# Patient Record
Sex: FEMALE | Race: WHITE | NOT HISPANIC OR LATINO | Employment: OTHER | URBAN - METROPOLITAN AREA
[De-identification: names, ages, dates, MRNs, and addresses within clinical notes are randomized per-mention and may not be internally consistent; named-entity substitution may affect disease eponyms.]

---

## 2017-01-02 ENCOUNTER — OFFICE VISIT (OUTPATIENT)
Dept: ORTHOPEDICS | Facility: CLINIC | Age: 82
End: 2017-01-02
Payer: MEDICARE

## 2017-01-02 VITALS — HEIGHT: 60 IN | RESPIRATION RATE: 18 BRPM | BODY MASS INDEX: 29.25 KG/M2 | WEIGHT: 149 LBS

## 2017-01-02 DIAGNOSIS — M75.101 ROTATOR CUFF TEAR ARTHROPATHY, RIGHT: Primary | ICD-10-CM

## 2017-01-02 DIAGNOSIS — M12.811 ROTATOR CUFF TEAR ARTHROPATHY, RIGHT: Primary | ICD-10-CM

## 2017-01-02 PROCEDURE — 20610 DRAIN/INJ JOINT/BURSA W/O US: CPT | Mod: RT | Performed by: ORTHOPAEDIC SURGERY

## 2017-01-02 RX ORDER — TRIAMCINOLONE ACETONIDE 40 MG/ML
80 INJECTION, SUSPENSION INTRA-ARTICULAR; INTRAMUSCULAR ONCE
Qty: 2 ML | Refills: 0 | OUTPATIENT
Start: 2017-01-02 | End: 2017-01-02

## 2017-01-02 NOTE — NURSING NOTE
Chief Complaint   Patient presents with     RECHECK     Right shoulder last injection on 10/24/16.       Initial Resp 18  Ht 1.524 m (5')  Wt 67.586 kg (149 lb)  BMI 29.10 kg/m2 Estimated body mass index is 29.1 kg/(m^2) as calculated from the following:    Height as of this encounter: 1.524 m (5').    Weight as of this encounter: 67.586 kg (149 lb).  BP completed using cuff size: NA (Not Taken)  Ivory Monzon MA

## 2017-01-02 NOTE — MR AVS SNAPSHOT
"              After Visit Summary   1/2/2017    Pam Hartman    MRN: [id number]           Patient Information     Date Of Birth          1935        Visit Information        Provider Department      1/2/2017 10:00 AM Nahun Skinner MD [facility]        Today's Diagnoses     Rotator cuff tear arthropathy, right    -  1       Care Instructions    You have been given a \"cortisone\" injection today.  There are two medications in the injection you were given.      One of these medications is lidocaine, which is similar to the numbing agent you receive at the dentist.  You should start to feel the effect of this medication starting a few  minutes after you are injected and it will last for about two hours.  After the lidocaine wears off, the area will feel sore and a small percentage of people actually have a slight increase of pain for the first 24-48 hours after the lidocaine wears off.     The other medication is the \"cortisone\" type medication.  This medication takes effect slowly and should make you more comfortable day by day over the next two weeks.  After the first two weeks, the medication levels off and keeps working over about three months or longer.      You may have the area injected, in general, every three months.  This is the estimated amount of time that the body takes to metabolize the \"cortisone.\"  Getting injections too frequently may result in a softening of the cartilage and cause the joint to actually wear out more quickly.    The most important thing for you to note is how the area feels over the next two hours.  If the correct space was injected, the area should feel much more comfortable over the next two hours.  If the area never gets comfortable over this time period, it is possible that the medication was trapped in some surrounding soft tissue and is not free in the joint.  If this happens, the medication will not work long-term.  However, if this occurs we can " attempt to re-inject the area sooner that the three months that is normally recommended.  If you report to us that the injection was NOT effective, we will ask you if it worked initially.  Please note how the area feels during the next two hours, as we may be inquiring about this.    To schedule another appointment, call [phone].          Follow-ups after your visit        Who to contact     If you have questions or need follow up information about today's clinic visit or your schedule please contact [facility] directly at [phone].  Normal or non-critical lab and imaging results will be communicated to you by [employer], letter or phone within 4 business days after the clinic has received the results. If you do not hear from us within 7 days, please contact the clinic through [employer] or phone. If you have a critical or abnormal lab result, we will notify you by phone as soon as possible.  Submit refill requests through [employer] or call your pharmacy and they will forward the refill request to us. Please allow 3 business days for your refill to be completed.          Additional Information About Your Visit        [employer] Information     [employer] gives you secure access to your electronic health record. If you see a primary care provider, you can also send messages to your care team and make appointments. If you have questions, please call your primary care clinic.  If you do not have a primary care provider, please call [phone] and they will assist you.        Your Vitals Were     Respirations Height BMI (Body Mass Index)             18 1.524 m (5') 29.10 kg/m2          Blood Pressure from Last 3 Encounters:   12/05/16 138/64   09/23/16 154/68   08/31/16 155/78    Weight from Last 3 Encounters:   01/02/17 67.586 kg (149 lb)   12/05/16 67.132 kg (148 lb)   10/24/16 68.947 kg (152 lb)              Today, you had the following     No orders found for display         Today's Medication Changes           These changes are accurate as of: 1/2/17 11:00 AM.  If you have any questions, ask your nurse or doctor.               These medicines have changed or have updated prescriptions.        Dose/Directions    furosemide 20 MG tablet   Commonly known as:  LASIX   This may have changed:  how much to take   Used for:  Generalized edema        Dose:  40 mg   Take 2 tablets (40 mg) by mouth daily   Quantity:  180 tablet   Refills:  3                Primary Care Provider Office Phone # Fax #    Satish Pena [phone]       [facility] [address] [place], [place]. \[facility].\"" [id number]        Thank you!     Thank you for choosing [facility]  for your care. Our goal is always to provide you with excellent care. Hearing back from our patients is one way we can continue to improve our services. Please take a few minutes to complete the written survey that you may receive in the mail after your visit with us. Thank you!             Your Updated Medication List - Protect others around you: Learn how to safely use, store and throw away your medicines at www.disposemymeds.org.          This list is accurate as of: 1/2/17 11:00 AM.  Always use your most recent med list.                   Brand Name Dispense Instructions for use    amLODIPine 5 MG tablet    NORVASC    90 tablet    Take 1 tablet (5 mg) by mouth daily       aspirin 81 MG tablet      Take 1 tablet by mouth daily.       blood glucose monitoring meter device kit    no brand specified    1 kit    Use to test blood sugar 2 times daily or as directed.       * blood glucose monitoring test strip    CHON CONTOUR    100 strip    1 strip by In Vitro route daily       * blood glucose monitoring test strip    no brand specified    100 each    Use to test blood sugars 2 times daily or as directed       calcipotriene 0.005 % Soln solution    DOVONEX    1 Bottle    Apply  topically. APPLY bid AS NEEDED       calcium  carbonate-vitamin D 500-400 MG-UNIT Tabs per tablet     180 tablet    ONE BY MOUTH TWICE A DAY       fluticasone 50 MCG/ACT spray    FLONASE    3 Bottle    Spray 1-2 sprays into both nostrils daily       furosemide 20 MG tablet    LASIX    180 tablet    Take 2 tablets (40 mg) by mouth daily       losartan 100 MG tablet    COZAAR    90 tablet    Take 1 tablet (100 mg) by mouth daily       metFORMIN 500 MG tablet    GLUCOPHAGE    90 tablet    Take 1 tablet (500 mg) by mouth daily (with breakfast)       PROBIOTIC PO          simvastatin 20 MG tablet    ZOCOR    90 tablet    Take 0.5 tablets (10 mg) by mouth At Bedtime       TYLENOL PO      Take 2,000 mg by mouth daily       * Notice:  This list has 2 medication(s) that are the same as other medications prescribed for you. Read the directions carefully, and ask your doctor or other care provider to review them with you.

## 2017-01-02 NOTE — PROGRESS NOTES
The patient's right shoulder was prepped with betadine solution after verification of allergies. Area approximately 10 cm x 10 cm prepped in a sterile fashion. After injection, betadine removed with soap and water and band-aids applied.    1ml kenalog with 1% lidocaine plain injected into patient's right shoulder by Dr. Nahun Skinner  LOT# [id number]  Exp.4/18  '

## 2017-01-02 NOTE — PATIENT INSTRUCTIONS
"You have been given a \"cortisone\" injection today.  There are two medications in the injection you were given.      One of these medications is lidocaine, which is similar to the numbing agent you receive at the dentist.  You should start to feel the effect of this medication starting a few  minutes after you are injected and it will last for about two hours.  After the lidocaine wears off, the area will feel sore and a small percentage of people actually have a slight increase of pain for the first 24-48 hours after the lidocaine wears off.     The other medication is the \"cortisone\" type medication.  This medication takes effect slowly and should make you more comfortable day by day over the next two weeks.  After the first two weeks, the medication levels off and keeps working over about three months or longer.      You may have the area injected, in general, every three months.  This is the estimated amount of time that the body takes to metabolize the \"cortisone.\"  Getting injections too frequently may result in a softening of the cartilage and cause the joint to actually wear out more quickly.    The most important thing for you to note is how the area feels over the next two hours.  If the correct space was injected, the area should feel much more comfortable over the next two hours.  If the area never gets comfortable over this time period, it is possible that the medication was trapped in some surrounding soft tissue and is not free in the joint.  If this happens, the medication will not work long-term.  However, if this occurs we can attempt to re-inject the area sooner that the three months that is normally recommended.  If you report to us that the injection was NOT effective, we will ask you if it worked initially.  Please note how the area feels during the next two hours, as we may be inquiring about this.    To schedule another appointment, call [phone].    "

## 2017-01-03 NOTE — PROGRESS NOTES
Follow up right shoulder cuff tear arthropathy.  Patient desires injection today of right shoulder.  Risks, benefits, potential complications and alternatives were discussed.   With the patient's consent, sterile prep was performed of right shoulder.  Right shoulder was injected with Kenalog 40 mg and lidocaine at shoulder subacromial space from the posterolateral approach .  Return to clinic as needed.

## 2017-04-03 ENCOUNTER — OFFICE VISIT (OUTPATIENT)
Dept: ORTHOPEDICS | Facility: CLINIC | Age: 82
End: 2017-04-03
Payer: MEDICARE

## 2017-04-03 VITALS — HEIGHT: 60 IN | WEIGHT: 148 LBS | BODY MASS INDEX: 29.06 KG/M2 | RESPIRATION RATE: 14 BRPM

## 2017-04-03 DIAGNOSIS — M75.101 ROTATOR CUFF TEAR ARTHROPATHY, RIGHT: Primary | ICD-10-CM

## 2017-04-03 DIAGNOSIS — M12.811 ROTATOR CUFF TEAR ARTHROPATHY, RIGHT: Primary | ICD-10-CM

## 2017-04-03 PROCEDURE — 20610 DRAIN/INJ JOINT/BURSA W/O US: CPT | Mod: RT | Performed by: ORTHOPAEDIC SURGERY

## 2017-04-03 RX ORDER — TRIAMCINOLONE ACETONIDE 40 MG/ML
40 INJECTION, SUSPENSION INTRA-ARTICULAR; INTRAMUSCULAR ONCE
Qty: 1 ML | Refills: 0 | OUTPATIENT
Start: 2017-04-03 | End: 2017-04-03

## 2017-04-03 NOTE — LETTER
4/3/2017       RE: Pam Hartman  [address]    [facility] [address]           Dear Colleague,    Thank you for referring your patient, Pam Hartman, to the [facility]. Please see a copy of my visit note below.    The patient's right shoulder was prepped with betadine solution after verification of allergies. Area approximately 10 cm x 10 cm prepped in a sterile fashion. After injection, betadine removed with soap and water and band-aids applied.    1ml kenalog with 1% lidocaine plain injected into patient's right shoulder by Dr. Nahun Skinner  LOT# [id number]  Exp. 05/2018    Follow up right shoulder cuff tear arthropathy.  Patient desires injection today of right shoulder.  Risks, benefits, potential complications and alternatives were discussed.   With the patient's consent, sterile prep was performed of right shoulder.  Right shoulder was injected with Kenalog 40 mg and lidocaine at shoulder subacromial space from the posterolateral approach .  Return to clinic as needed.  If pain continues, we will consider reverse total shoulder arthroplasty.  Would need new x-ray and then [facility] CT.      Again, thank you for allowing me to participate in the care of your patient.        Sincerely,              Nahun Skinner MD

## 2017-04-03 NOTE — PROGRESS NOTES
The patient's right shoulder was prepped with betadine solution after verification of allergies. Area approximately 10 cm x 10 cm prepped in a sterile fashion. After injection, betadine removed with soap and water and band-aids applied.    1ml kenalog with 1% lidocaine plain injected into patient's right shoulder by Dr. Nahun Skinner  LOT# [id number]  Exp. 05/2018

## 2017-04-03 NOTE — NURSING NOTE
Chief Complaint   Patient presents with     RECHECK     Follow-up for right shoulder cuff tear arthropathy, last injection 1/2/17.       Initial Resp 14  Ht 1.524 m (5')  Wt 67.1 kg (148 lb)  BMI 28.9 kg/m2 Estimated body mass index is 28.9 kg/(m^2) as calculated from the following:    Height as of this encounter: 1.524 m (5').    Weight as of this encounter: 67.1 kg (148 lb).  Medication Reconciliation: complete     Mike Agudelo PA-C  Supervising physician: Nahun Skinner MD  Dept. of Orthopedics  [facility]

## 2017-04-03 NOTE — MR AVS SNAPSHOT
After Visit Summary   4/3/2017    Pam Hartman    MRN: [id number]           Patient Information     Date Of Birth          1935        Visit Information        Provider Department      4/3/2017 9:30 AM Nahun Skinner MD [facility]        Today's Diagnoses     Rotator cuff tear arthropathy, right    -  1      Care Instructions    You have had a steroid injection today.  For the first 2 hours there will likely be some numbing in the joint from the lidocaine.  This is a good sign, indicating that the injection is in the right place.  In 2 hours the lidocaine will wear off, and the joint will hurt like you had a shot.  Each day the cortisone makes it feel better.  It reaches peak effect in 2 weeks.  We expect it to last for 3 months.  You may resume regular activity when you feel ready.  If you are diabetic, your glucoses will be quite high for several days.          Follow-ups after your visit        Who to contact     If you have questions or need follow up information about today's clinic visit or your schedule please contact [facility] directly at [phone].  Normal or non-critical lab and imaging results will be communicated to you by [employer], letter or phone within 4 business days after the clinic has received the results. If you do not hear from us within 7 days, please contact the clinic through [employer] or phone. If you have a critical or abnormal lab result, we will notify you by phone as soon as possible.  Submit refill requests through [employer] or call your pharmacy and they will forward the refill request to us. Please allow 3 business days for your refill to be completed.          Additional Information About Your Visit        [employer] Information     [employer] gives you secure access to your electronic health record. If you see a primary care provider, you can also send messages to your care team and make appointments. If you have questions, please  call your primary care clinic.  If you do not have a primary care provider, please call [phone] and they will assist you.        Care EveryWhere ID     This is your Care EveryWhere ID. This could be used by other organizations to access your [facility] medical records  [id number]        Your Vitals Were     Respirations Height BMI (Body Mass Index)             14 1.524 m (5') 28.9 kg/m2          Blood Pressure from Last 3 Encounters:   12/05/16 138/64   09/23/16 154/68   08/31/16 155/78    Weight from Last 3 Encounters:   04/03/17 67.1 kg (148 lb)   01/02/17 67.6 kg (149 lb)   12/05/16 67.1 kg (148 lb)              We Performed the Following     DRAIN/INJECT LARGE JOINT/BURSA     TRIAMCINOLONE ACET INJ NOS          Today's Medication Changes          These changes are accurate as of: 4/3/17 12:48 PM.  If you have any questions, ask your nurse or doctor.               Start taking these medicines.        Dose/Directions    triamcinolone acetonide 40 MG/ML injection   Commonly known as:  KENALOG   Used for:  Rotator cuff tear arthropathy, right   Started by:  Nahun Skinner MD        Dose:  40 mg   1 mL (40 mg) by INTRA-ARTICULAR route once for 1 dose   Quantity:  1 mL   Refills:  0         These medicines have changed or have updated prescriptions.        Dose/Directions    furosemide 20 MG tablet   Commonly known as:  LASIX   This may have changed:  how much to take   Used for:  Generalized edema        Dose:  40 mg   Take 2 tablets (40 mg) by mouth daily   Quantity:  180 tablet   Refills:  3            Where to get your medicines      Some of these will need a paper prescription and others can be bought over the counter.  Ask your nurse if you have questions.     You don't need a prescription for these medications     triamcinolone acetonide 40 MG/ML injection                Primary Care Provider Office Phone # Fax #    Satish Pena [phone]       [place] [address]  [facility] [address]        Thank you!     Thank you for [facility]  for your care. Our goal is always to provide you with excellent care. Hearing back from our patients is one way we can continue to improve our services. Please take a few minutes to complete the written survey that you may receive in the mail after your visit with us. Thank you!             Your Updated Medication List - Protect others around you: Learn how to safely use, store and throw away your medicines at www.disposemymeds.org.          This list is accurate as of: 4/3/17 12:48 PM.  Always use your most recent med list.                   Brand Name Dispense Instructions for use    amLODIPine 5 MG tablet    NORVASC    90 tablet    Take 1 tablet (5 mg) by mouth daily       aspirin 81 MG tablet      Take 1 tablet by mouth daily.       blood glucose monitoring meter device kit    no brand specified    1 kit    Use to test blood sugar 2 times daily or as directed.       * blood glucose monitoring test strip    CHON CONTOUR    100 strip    1 strip by In Vitro route daily       * blood glucose monitoring test strip    no brand specified    100 each    Use to test blood sugars 2 times daily or as directed       calcipotriene 0.005 % Soln solution    DOVONEX    1 Bottle    Apply  topically. APPLY bid AS NEEDED       calcium carbonate-vitamin D 500-400 MG-UNIT Tabs per tablet     180 tablet    ONE BY MOUTH TWICE A DAY       fluticasone 50 MCG/ACT spray    FLONASE    3 Bottle    Spray 1-2 sprays into both nostrils daily       furosemide 20 MG tablet    LASIX    180 tablet    Take 2 tablets (40 mg) by mouth daily       losartan 100 MG tablet    COZAAR    90 tablet    Take 1 tablet (100 mg) by mouth daily       metFORMIN 500 MG tablet    GLUCOPHAGE    90 tablet    Take 1 tablet (500 mg) by mouth daily (with breakfast)       PROBIOTIC PO          simvastatin 20 MG tablet    ZOCOR    90 tablet    Take 0.5 tablets  (10 mg) by mouth At Bedtime       triamcinolone acetonide 40 MG/ML injection    KENALOG    1 mL    1 mL (40 mg) by INTRA-ARTICULAR route once for 1 dose       TYLENOL PO      Take 2,000 mg by mouth daily       * Notice:  This list has 2 medication(s) that are the same as other medications prescribed for you. Read the directions carefully, and ask your doctor or other care provider to review them with you.

## 2017-04-03 NOTE — PROGRESS NOTES
Follow up right shoulder cuff tear arthropathy.  Patient desires injection today of right shoulder.  Risks, benefits, potential complications and alternatives were discussed.   With the patient's consent, sterile prep was performed of right shoulder.  Right shoulder was injected with Kenalog 40 mg and lidocaine at shoulder subacromial space from the posterolateral approach .  Return to clinic as needed.  If pain continues, we will consider reverse total shoulder arthroplasty.  Would need new x-ray and then [facility] CT.

## 2017-05-23 ENCOUNTER — TELEPHONE (OUTPATIENT)
Dept: ORTHOPEDICS | Facility: CLINIC | Age: 82
End: 2017-05-23

## 2017-05-23 NOTE — TELEPHONE ENCOUNTER
Patient is calling wanting to schedule surgery for her right shoulder. Please call and advise   Patient will be at her home number until 12:00pm then she is leaving for her cabin you can   Reach her at [phone] you can leave a message. Thank you

## 2017-05-23 NOTE — TELEPHONE ENCOUNTER
Spoke with Pam regarding her request. I let her know that I looked at her last visit note with Dr. Skinner on 4/3/17. I told her at this point the next step would be for her to make an office visit with Dr. Skinner to be reevaluated and see if surgery is the appropriate next step. I gave her our clinic locations/availability and she has the number to call to schedule an appointment. She had no further questions at this time.    INNI AyonC  Supervising physician: Nahun Skinner MD  Dept. of Orthopedics  [facility]

## 2017-06-01 ENCOUNTER — RADIANT APPOINTMENT (OUTPATIENT)
Dept: GENERAL RADIOLOGY | Facility: CLINIC | Age: 82
End: 2017-06-01
Attending: ORTHOPAEDIC SURGERY
Payer: MEDICARE

## 2017-06-01 ENCOUNTER — OFFICE VISIT (OUTPATIENT)
Dept: ORTHOPEDICS | Facility: CLINIC | Age: 82
End: 2017-06-01
Payer: MEDICARE

## 2017-06-01 DIAGNOSIS — M75.101 ROTATOR CUFF TEAR ARTHROPATHY, RIGHT: Primary | ICD-10-CM

## 2017-06-01 DIAGNOSIS — M75.101 TEAR OF RIGHT ROTATOR CUFF, UNSPECIFIED TEAR EXTENT: ICD-10-CM

## 2017-06-01 DIAGNOSIS — M12.811 ROTATOR CUFF TEAR ARTHROPATHY, RIGHT: Primary | ICD-10-CM

## 2017-06-01 PROCEDURE — 99213 OFFICE O/P EST LOW 20 MIN: CPT | Performed by: ORTHOPAEDIC SURGERY

## 2017-06-01 PROCEDURE — 73030 X-RAY EXAM OF SHOULDER: CPT | Mod: RT

## 2017-06-01 NOTE — LETTER
6/1/2017       RE: Pam Hartman  [address]    [facility] [address]           Dear Colleague,    Thank you for referring your patient, Pam Hartman, to the [facility]. Please see a copy of my visit note below.    Pam Hartman is a 82 year old female who is seen in follow-up for right cuff tear arthropathy.    Past Medical History:   Diagnosis Date     Colon polyp      Degenerative arthritis      DM II (diabetes mellitus, type II)      HTN      Hyperlipidemia LDL goal < 100      IBS (irritable bowel syndrome)      Osteopenia      Psoriasis      Type 2 diabetes mellitus without complication (H) 10/21/2015     Ulnar neuropathy        Past Surgical History:   Procedure Laterality Date     EYE SURGERY  7/2015    Left and right cataract repair     SEPTOPLASTY  1/16/2014    Procedure: SEPTOPLASTY;  Septoplasty, Left ethmoidectomy, Left endoscopic maxillary antrostomy, right kike bullosa;  Surgeon: Sarah Garcia MD;  Location: [facility] OR     TONSILLECTOMY & ADENOIDECTOMY         Family History   Problem Relation Age of Onset     CEREBROVASCULAR DISEASE Father        Social History     Social History     Marital status:      Spouse name: N/A     Number of children: N/A     Years of education: N/A     Occupational History     Not on file.     Social History Main Topics     Smoking status: Former Smoker     Quit date: 11/30/1984     Smokeless tobacco: Never Used     Alcohol use Yes      Comment: 2 DRINKS PER MONTH     Drug use: No     Sexual activity: No     Other Topics Concern     Parent/Sibling W/ Cabg, Mi Or Angioplasty Before 65f 55m? No     Social History Narrative       Current Outpatient Prescriptions   Medication Sig Dispense Refill     simvastatin (ZOCOR) 20 MG tablet Take 0.5 tablets (10 mg) by mouth At Bedtime 90 tablet 4     amLODIPine (NORVASC) 5 MG tablet Take 1 tablet (5 mg) by mouth daily 90 tablet 3     metFORMIN (GLUCOPHAGE) 500 MG tablet Take 1 tablet  (500 mg) by mouth daily (with breakfast) 90 tablet 3     furosemide (LASIX) 20 MG tablet Take 2 tablets (40 mg) by mouth daily (Patient taking differently: Take 20 mg by mouth daily ) 180 tablet 3     fluticasone (FLONASE) 50 MCG/ACT nasal spray Spray 1-2 sprays into both nostrils daily 3 Bottle 11     losartan (COZAAR) 100 MG tablet Take 1 tablet (100 mg) by mouth daily 90 tablet 3     blood glucose monitoring (NO BRAND SPECIFIED) meter device kit Use to test blood sugar 2 times daily or as directed. 1 kit 0     blood glucose monitoring (NO BRAND SPECIFIED) test strip Use to test blood sugars 2 times daily or as directed 100 each 12     calcium carbonate-vitamin D 500-400 MG-UNIT TABS tablt ONE BY MOUTH TWICE A  tablet 3     blood glucose (CHON CONTOUR) test strip 1 strip by In Vitro route daily 100 strip PRN     Probiotic Product (PROBIOTIC PO)        Acetaminophen (TYLENOL PO) Take 2,000 mg by mouth daily        aspirin 81 MG tablet Take 1 tablet by mouth daily.       calcipotriene (DOVONEX) 0.005 % SOLN Apply  topically. APPLY bid AS NEEDED 1 Bottle 4       Allergies   Allergen Reactions     Septra [Bactrim]      Sulfa Drugs Rash       REVIEW OF SYSTEMS:  CONSTITUTIONAL:  NEGATIVE for fever, chills, change in weight, not feeling tired  SKIN:  NEGATIVE for worrisome rashes, no skin lumps, no skin ulcers and no non-healing wounds  EYES:  NEGATIVE for vision changes or irritation.  ENT/MOUTH:  NEGATIVE.  No hearing loss, no hoarseness, no difficulty swallowing.  RESP:  NEGATIVE. No cough or shortness of breath.  BREAST:  NEGATIVE for masses, tenderness or discharge  CV:  NEGATIVE for chest pain, palpitations or peripheral edema  GI:  NEGATIVE for nausea, abdominal pain, heartburn, or change in bowel habits  :  Negative. No dysuria, no hematuria  MUSCULOSKELETAL:  See HPI above  NEURO:  NEGATIVE . No headaches, no dizziness,  no numbness  ENDOCRINE:  NEGATIVE for temperature intolerance, skin/hair  changes  HEME/ALLERGY/IMMUNE:  NEGATIVE for bleeding problems  PSYCHIATRIC:  NEGATIVE. no anxiety, no depression.     Exam:  Vitals: There were no vitals taken for this visit.  BMI= There is no height or weight on file to calculate BMI.  Constitutional:  healthy, alert and no distress  Neuro: Alert and Oriented x 3, Gait normal. Sensation grossly WNL.  Psych: Affect normal   Respiratory: Breathing not labored.  Cardiovascular: normal peripheral pulses  Lymph: no adenopathy  Skin: No rashes,worrisome lesions or skin problems      Again, thank you for allowing me to participate in the care of your patient.        Sincerely,              Nahun Skinner MD

## 2017-06-01 NOTE — MR AVS SNAPSHOT
After Visit Summary   6/1/2017    Pam Hartman    MRN: [id number]           Patient Information     Date Of Birth          1935        Visit Information        Provider Department      6/1/2017 2:30 PM Nahun Skinner MD [facility]        Today's Diagnoses     Tear of right rotator cuff, unspecified tear extent    -  1      Care Instructions    Pam Hartman  and I talked about her condition and diagnosis.  Because of severe arthritis, and failure of conservative measures, we have decided to proceed with right reverse total shoulder arthroplasty.      We have talked in depth about the procedure and the risks, which include, but are not limited to:  * blood loss possibly requiring transfusion,   * blood clots with possible pulmonary embolism  * risk of dislocation.  There will be a motion restriction for 4-6 weeks.  Do not reach behind, do not push up off a chair or bed.  * infection or wound healing problems  * nerve damage   * vascular circulation problems  * continued pain  * Loosening or wear  * anesthetic risks  * The possibility of needing additional procedures.      We also talked about recovery time, which differs from patient to patient.    Average 1  day in the hospital.  Most people can return home at that point.  Physical Therapy will start at 2 weeks.    Preop xrays have been ordered, and medical clearance will need to be obtained.    Preop physical with primary physician is needed within 30 days of the surgery.  Nothing to eat or drink for 8 hours before surgery.  Stop blood thinners 5 days before surgery (aspirin, warfarin, anti-inflammatories).      Call [phone] to schedule your surgery.          Follow-ups after your visit        Who to contact     If you have questions or need follow up information about today's clinic visit or your schedule please contact [facility] directly at [phone].  Normal or non-critical lab and imaging results  will be communicated to you by TakeChargehart, letter or phone within 4 business days after the clinic has received the results. If you do not hear from us within 7 days, please contact the clinic through Conjectur or phone. If you have a critical or abnormal lab result, we will notify you by phone as soon as possible.  Submit refill requests through Conjectur or call your pharmacy and they will forward the refill request to us. Please allow 3 business days for your refill to be completed.          Additional Information About Your Visit        Conjectur Information     Conjectur gives you secure access to your electronic health record. If you see a primary care provider, you can also send messages to your care team and make appointments. If you have questions, please call your primary care clinic.  If you do not have a primary care provider, please call [phone] and they will assist you.        Care EveryWhere ID     This is your Care EveryWhere ID. This could be used by other organizations to access your [facility] medical records  [id number]         Blood Pressure from Last 3 Encounters:   12/05/16 138/64   09/23/16 154/68   08/31/16 155/78    Weight from Last 3 Encounters:   04/03/17 67.1 kg (148 lb)   01/02/17 67.6 kg (149 lb)   12/05/16 67.1 kg (148 lb)                 Today's Medication Changes          These changes are accurate as of: 6/1/17  2:36 PM.  If you have any questions, ask your nurse or doctor.               These medicines have changed or have updated prescriptions.        Dose/Directions    furosemide 20 MG tablet   Commonly known as:  LASIX   This may have changed:  how much to take   Used for:  Generalized edema        Dose:  40 mg   Take 2 tablets (40 mg) by mouth daily   Quantity:  180 tablet   Refills:  3                Primary Care Provider Office Phone # Fax #    Satish Pena [phone]       [address] [address]        Thank  you!     Thank you for choosing [facility]  for your care. Our goal is always to provide you with excellent care. Hearing back from our patients is one way we can continue to improve our services. Please take a few minutes to complete the written survey that you may receive in the mail after your visit with us. Thank you!             Your Updated Medication List - Protect others around you: Learn how to safely use, store and throw away your medicines at www.disposemymeds.org.          This list is accurate as of: 6/1/17  2:36 PM.  Always use your most recent med list.                   Brand Name Dispense Instructions for use    amLODIPine 5 MG tablet    NORVASC    90 tablet    Take 1 tablet (5 mg) by mouth daily       aspirin 81 MG tablet      Take 1 tablet by mouth daily.       blood glucose monitoring meter device kit    no brand specified    1 kit    Use to test blood sugar 2 times daily or as directed.       * blood glucose monitoring test strip    CHON CONTOUR    100 strip    1 strip by In Vitro route daily       * blood glucose monitoring test strip    no brand specified    100 each    Use to test blood sugars 2 times daily or as directed       calcipotriene 0.005 % Soln solution    DOVONEX    1 Bottle    Apply  topically. APPLY bid AS NEEDED       calcium carbonate-vitamin D 500-400 MG-UNIT Tabs per tablet     180 tablet    ONE BY MOUTH TWICE A DAY       fluticasone 50 MCG/ACT spray    FLONASE    3 Bottle    Spray 1-2 sprays into both nostrils daily       furosemide 20 MG tablet    LASIX    180 tablet    Take 2 tablets (40 mg) by mouth daily       losartan 100 MG tablet    COZAAR    90 tablet    Take 1 tablet (100 mg) by mouth daily       metFORMIN 500 MG tablet    GLUCOPHAGE    90 tablet    Take 1 tablet (500 mg) by mouth daily (with breakfast)       PROBIOTIC PO          simvastatin 20 MG tablet    ZOCOR    90 tablet    Take 0.5 tablets (10 mg) by mouth At Bedtime       TYLENOL PO      Take  2,000 mg by mouth daily       * Notice:  This list has 2 medication(s) that are the same as other medications prescribed for you. Read the directions carefully, and ask your doctor or other care provider to review them with you.

## 2017-06-01 NOTE — PATIENT INSTRUCTIONS
Pam Hartman  and I talked about her condition and diagnosis.  Because of severe arthritis, and failure of conservative measures, we have decided to proceed with right reverse total shoulder arthroplasty.      We have talked in depth about the procedure and the risks, which include, but are not limited to:  * blood loss possibly requiring transfusion,   * blood clots with possible pulmonary embolism  * risk of dislocation.  There will be a motion restriction for 4-6 weeks.  Do not reach behind, do not push up off a chair or bed.  * infection or wound healing problems  * nerve damage   * vascular circulation problems  * continued pain  * Loosening or wear  * anesthetic risks  * The possibility of needing additional procedures.      We also talked about recovery time, which differs from patient to patient.    Average 1  day in the hospital.  Most people can return home at that point.  Physical Therapy will start at 2 weeks.    Preop xrays have been ordered, and medical clearance will need to be obtained.    Preop physical with primary physician is needed within 30 days of the surgery.  Nothing to eat or drink for 8 hours before surgery.  Stop blood thinners 5 days before surgery (aspirin, warfarin, anti-inflammatories).      Call [phone] to schedule your surgery.

## 2017-06-02 ENCOUNTER — RADIANT APPOINTMENT (OUTPATIENT)
Dept: CT IMAGING | Facility: CLINIC | Age: 82
End: 2017-06-02
Attending: ORTHOPAEDIC SURGERY
Payer: MEDICARE

## 2017-06-02 DIAGNOSIS — M75.101 TEAR OF RIGHT ROTATOR CUFF, UNSPECIFIED TEAR EXTENT: ICD-10-CM

## 2017-06-02 PROCEDURE — 73200 CT UPPER EXTREMITY W/O DYE: CPT | Mod: TC

## 2017-06-02 NOTE — PROGRESS NOTES
Pam Hartman is a 82 year old female who is seen in follow-up for right cuff tear arthropathy.    Past Medical History:   Diagnosis Date     Colon polyp      Degenerative arthritis      DM II (diabetes mellitus, type II)      HTN      Hyperlipidemia LDL goal < 100      IBS (irritable bowel syndrome)      Osteopenia      Psoriasis      Type 2 diabetes mellitus without complication (H) 10/21/2015     Ulnar neuropathy        Past Surgical History:   Procedure Laterality Date     EYE SURGERY  7/2015    Left and right cataract repair     SEPTOPLASTY  1/16/2014    Procedure: SEPTOPLASTY;  Septoplasty, Left ethmoidectomy, Left endoscopic maxillary antrostomy, right kike bullosa;  Surgeon: Sarah Garcia MD;  Location: [facility] OR     TONSILLECTOMY & ADENOIDECTOMY         Family History   Problem Relation Age of Onset     CEREBROVASCULAR DISEASE Father        Social History     Social History     Marital status:      Spouse name: N/A     Number of children: N/A     Years of education: N/A     Occupational History     Not on file.     Social History Main Topics     Smoking status: Former Smoker     Quit date: 11/30/1984     Smokeless tobacco: Never Used     Alcohol use Yes      Comment: 2 DRINKS PER MONTH     Drug use: No     Sexual activity: No     Other Topics Concern     Parent/Sibling W/ Cabg, Mi Or Angioplasty Before 65f 55m? No     Social History Narrative       Current Outpatient Prescriptions   Medication Sig Dispense Refill     simvastatin (ZOCOR) 20 MG tablet Take 0.5 tablets (10 mg) by mouth At Bedtime 90 tablet 4     amLODIPine (NORVASC) 5 MG tablet Take 1 tablet (5 mg) by mouth daily 90 tablet 3     metFORMIN (GLUCOPHAGE) 500 MG tablet Take 1 tablet (500 mg) by mouth daily (with breakfast) 90 tablet 3     furosemide (LASIX) 20 MG tablet Take 2 tablets (40 mg) by mouth daily (Patient taking differently: Take 20 mg by mouth daily ) 180 tablet 3     fluticasone (FLONASE) 50 MCG/ACT nasal spray Spray 1-2  sprays into both nostrils daily 3 Bottle 11     losartan (COZAAR) 100 MG tablet Take 1 tablet (100 mg) by mouth daily 90 tablet 3     blood glucose monitoring (NO BRAND SPECIFIED) meter device kit Use to test blood sugar 2 times daily or as directed. 1 kit 0     blood glucose monitoring (NO BRAND SPECIFIED) test strip Use to test blood sugars 2 times daily or as directed 100 each 12     calcium carbonate-vitamin D 500-400 MG-UNIT TABS tablt ONE BY MOUTH TWICE A  tablet 3     blood glucose (CHON CONTOUR) test strip 1 strip by In Vitro route daily 100 strip PRN     Probiotic Product (PROBIOTIC PO)        Acetaminophen (TYLENOL PO) Take 2,000 mg by mouth daily        aspirin 81 MG tablet Take 1 tablet by mouth daily.       calcipotriene (DOVONEX) 0.005 % SOLN Apply  topically. APPLY bid AS NEEDED 1 Bottle 4       Allergies   Allergen Reactions     Septra [Bactrim]      Sulfa Drugs Rash       REVIEW OF SYSTEMS:  CONSTITUTIONAL:  NEGATIVE for fever, chills, change in weight, not feeling tired  SKIN:  NEGATIVE for worrisome rashes, no skin lumps, no skin ulcers and no non-healing wounds  EYES:  NEGATIVE for vision changes or irritation.  ENT/MOUTH:  NEGATIVE.  No hearing loss, no hoarseness, no difficulty swallowing.  RESP:  NEGATIVE. No cough or shortness of breath.  BREAST:  NEGATIVE for masses, tenderness or discharge  CV:  NEGATIVE for chest pain, palpitations or peripheral edema  GI:  NEGATIVE for nausea, abdominal pain, heartburn, or change in bowel habits  :  Negative. No dysuria, no hematuria  MUSCULOSKELETAL:  See HPI above  NEURO:  NEGATIVE . No headaches, no dizziness,  no numbness  ENDOCRINE:  NEGATIVE for temperature intolerance, skin/hair changes  HEME/ALLERGY/IMMUNE:  NEGATIVE for bleeding problems  PSYCHIATRIC:  NEGATIVE. no anxiety, no depression.     Exam:  Vitals: There were no vitals taken for this visit.  BMI= There is no height or weight on file to calculate BMI.  Constitutional:  healthy,  alert and no distress  Neuro: Alert and Oriented x 3, Gait normal. Sensation grossly WNL.  Psych: Affect normal   Respiratory: Breathing not labored.  Cardiovascular: normal peripheral pulses  Lymph: no adenopathy  Skin: No rashes,worrisome lesions or skin problems

## 2017-06-03 NOTE — PROGRESS NOTES
DATE OF VISIT:  2017       HISTORY OF PRESENT ILLNESS:  Ms. Pam Hartman is an 82-year-old female we have followed for right shoulder cuff tear arthropathy.  She has had several injections but injections have been working less well.  She thinks at this point because of pain, she is considering shoulder surgery with the reverse total shoulder arthroplasty.  We have repeated x-ray today showing severe osteoarthritis and superior migration of the humerus inconsistent with cuff tear arthropathy.      PHYSICAL EXAMINATION:  Shows full passive motion of the shoulder with full flexion at about 70 degrees, internal and external rotation.  She does have pain with further rotation.  She has definite weakness of resisted external rotation and abduction and has some pain with this.  She has mild pain with resisted internal rotation, but good strength.  Sensation and circulation are intact to the arm.      IMPRESSION:  Right shoulder cuff tear arthropathy.  She has maintained good motion.  We will proceed with the significant protocol CT and will plan reverse total shoulder arthroplasty for some time in mid July.  Risks and benefits were discussed with her and her  today.         SIERRA SWAIN MD             D: 2017 18:45   T: 2017 10:15   MT: SHANELLE      Name:     PAM HARTMAN   MRN:      [id number]        Account:      [id number]   :      1935           Visit Date:   2017      Document: [id number]

## 2017-06-05 DIAGNOSIS — E11.9 TYPE 2 DIABETES MELLITUS WITHOUT COMPLICATION, WITHOUT LONG-TERM CURRENT USE OF INSULIN (H): Primary | ICD-10-CM

## 2017-06-05 NOTE — TELEPHONE ENCOUNTER
Routing refill request to provider for review/approval because:  No associated diagnosis.  Darcy Gama RN [facility] Triage.

## 2017-06-05 NOTE — TELEPHONE ENCOUNTER
blood glucose monitoring (NO BRAND SPECIFIED) test strip         Last Written Prescription Date: 4-1-16  Last Fill Quantity: 100, # refills: 12  Last Office Visit with G, P or [facility] prescribing provider:  12-5-16   Next 5 appointments (look out 90 days)     Jul 13, 2017  9:20 AM CDT   [facility]    [address] [facility] [address] [phone]            Aug 01, 2017  1:00 PM CDT   [facility]    [address] [facility] [address] [phone]                   BP Readings from Last 3 Encounters:   12/05/16 138/64   09/23/16 154/68   08/31/16 155/78     Lab Results   Component Value Date    MICROL 25 07/11/2016     Lab Results   Component Value Date    UMALCR 16.38 07/11/2016     Creatinine   Date Value Ref Range Status   11/28/2016 1.12 (H) 0.52 - 1.04 mg/dL Final   ]  GFR Estimate   Date Value Ref Range Status   11/28/2016 47 (L) >60 mL/min/1.7m2 Final     Comment:     Non  GFR Calc   07/11/2016 54 (L) >60 mL/min/1.7m2 Final     Comment:     Non  GFR Calc   01/15/2016 61 >60 mL/min/1.7m2 Final     Comment:     Non  GFR Calc     GFR Estimate If Black   Date Value Ref Range Status   11/28/2016 57 (L) >60 mL/min/1.7m2 Final     Comment:      GFR Calc   07/11/2016 66 >60 mL/min/1.7m2 Final     Comment:      GFR Calc   01/15/2016 74 >60 mL/min/1.7m2 Final     Comment:      GFR Calc     Lab Results   Component Value Date    CHOL 150 07/11/2016     Lab Results   Component Value Date    HDL 46 07/11/2016     Lab Results   Component Value Date    LDL 69 07/11/2016     Lab Results   Component Value Date    TRIG 175 07/11/2016     Lab Results   Component Value Date     CHOLHDTONYATIO 3.5 07/24/2015     Lab Results   Component Value Date    AST 23 08/31/2009     Lab Results   Component Value Date    ALT 22 07/11/2016     Lab Results   Component Value Date    A1C 7.6 11/28/2016    A1C 7.6 07/11/2016    A1C 7.3 01/15/2016    A1C 8.0 07/24/2015    A1C 7.1 01/14/2015     Potassium   Date Value Ref Range Status   11/28/2016 3.8 3.4 - 5.3 mmol/L Final

## 2017-06-07 ENCOUNTER — MYC MEDICAL ADVICE (OUTPATIENT)
Dept: FAMILY MEDICINE | Facility: CLINIC | Age: 82
End: 2017-06-07

## 2017-06-07 DIAGNOSIS — T78.3XXA ANGIOEDEMA, INITIAL ENCOUNTER: ICD-10-CM

## 2017-06-07 DIAGNOSIS — R73.02 GLUCOSE INTOLERANCE (IMPAIRED GLUCOSE TOLERANCE): ICD-10-CM

## 2017-06-16 ENCOUNTER — TELEPHONE (OUTPATIENT)
Dept: FAMILY MEDICINE | Facility: CLINIC | Age: 82
End: 2017-06-16

## 2017-06-16 NOTE — TELEPHONE ENCOUNTER
Reason for call:  questions   Patient called regarding (reason for call): questions  Additional comments: Patient has some questions about the surgery and would like a call back to discuss further    Phone number to reach patient:  Home number on file [phone] (home) or at her cabin next week at [phone]    Best Time:  anytime    Can we leave a detailed message on this number?  YES

## 2017-06-16 NOTE — TELEPHONE ENCOUNTER
TC contacted pharmacy and was told that prescription needs a new diagnosis code , please complete a new script and fax to pharmacy. NANCY Simpson

## 2017-06-16 NOTE — TELEPHONE ENCOUNTER
Spoke to pharmacist needs a complete new script with the diagnoses codes on it. Transferred diagnosis code from problem list to the script and sent.    Brielle Pedroza RN  [facility]

## 2017-06-19 NOTE — TELEPHONE ENCOUNTER
Spoke with Pam regarding her questions about her surgery in July. She wanted to confirm when to stop her aspirin and I told her she should stop it 5 days before her surgery. She was curious about the aspirin after surgery and I told her that they will start that in the hospital after her surgery. She also wanted to know when she will be able to wear t-shirts. I told her that would depend on her recovery and that she would need assistance putting them on and taking them off. Her last question was whether she would be able to wear a bra after her surgery. I told her I thought that this would be possible but it depends on whether the strap goes over the incision. She had no further questions.    NINI AyonC  Supervising physician: Nahun Skinner MD  Dept. of Orthopedics  [facility]

## 2017-07-10 ENCOUNTER — OFFICE VISIT (OUTPATIENT)
Dept: FAMILY MEDICINE | Facility: CLINIC | Age: 82
End: 2017-07-10
Payer: MEDICARE

## 2017-07-10 VITALS
HEART RATE: 82 BPM | DIASTOLIC BLOOD PRESSURE: 70 MMHG | SYSTOLIC BLOOD PRESSURE: 148 MMHG | OXYGEN SATURATION: 98 % | TEMPERATURE: 98.5 F | WEIGHT: 148 LBS | BODY MASS INDEX: 28.9 KG/M2

## 2017-07-10 DIAGNOSIS — Z01.818 PREOP GENERAL PHYSICAL EXAM: Primary | ICD-10-CM

## 2017-07-10 DIAGNOSIS — Z13.5 SCREENING FOR DIABETIC RETINOPATHY: ICD-10-CM

## 2017-07-10 DIAGNOSIS — I10 ESSENTIAL HYPERTENSION WITH GOAL BLOOD PRESSURE LESS THAN 140/90: ICD-10-CM

## 2017-07-10 DIAGNOSIS — E11.9 TYPE 2 DIABETES MELLITUS WITHOUT COMPLICATION, WITHOUT LONG-TERM CURRENT USE OF INSULIN (H): ICD-10-CM

## 2017-07-10 LAB
ANION GAP SERPL CALCULATED.3IONS-SCNC: 8 MMOL/L (ref 3–14)
BUN SERPL-MCNC: 20 MG/DL (ref 7–30)
CALCIUM SERPL-MCNC: 10.7 MG/DL (ref 8.5–10.1)
CHLORIDE SERPL-SCNC: 104 MMOL/L (ref 94–109)
CO2 SERPL-SCNC: 29 MMOL/L (ref 20–32)
CREAT SERPL-MCNC: 0.91 MG/DL (ref 0.52–1.04)
GFR SERPL CREATININE-BSD FRML MDRD: 59 ML/MIN/1.7M2
GLUCOSE SERPL-MCNC: 149 MG/DL (ref 70–99)
HGB BLD-MCNC: 14.5 G/DL (ref 11.7–15.7)
POTASSIUM SERPL-SCNC: 4.6 MMOL/L (ref 3.4–5.3)
SODIUM SERPL-SCNC: 141 MMOL/L (ref 133–144)

## 2017-07-10 PROCEDURE — 80048 BASIC METABOLIC PNL TOTAL CA: CPT | Performed by: INTERNAL MEDICINE

## 2017-07-10 PROCEDURE — 85018 HEMOGLOBIN: CPT | Performed by: INTERNAL MEDICINE

## 2017-07-10 PROCEDURE — 99214 OFFICE O/P EST MOD 30 MIN: CPT | Performed by: INTERNAL MEDICINE

## 2017-07-10 PROCEDURE — 93000 ELECTROCARDIOGRAM COMPLETE: CPT | Performed by: INTERNAL MEDICINE

## 2017-07-10 RX ORDER — LOSARTAN POTASSIUM 100 MG/1
100 TABLET ORAL DAILY
Qty: 90 TABLET | Refills: 3 | Status: SHIPPED | OUTPATIENT
Start: 2017-07-10 | End: 2018-04-25

## 2017-07-10 ASSESSMENT — PAIN SCALES - GENERAL: PAINLEVEL: NO PAIN (0)

## 2017-07-10 NOTE — NURSING NOTE
Chief Complaint   Patient presents with     Pre-Op Exam       Initial /78 (BP Location: Right arm, Patient Position: Chair, Cuff Size: Adult Regular)  Pulse 82  Temp 98.5  F (36.9  C) (Oral)  Wt 148 lb (67.1 kg)  SpO2 98%  BMI 28.9 kg/m2 Estimated body mass index is 28.9 kg/(m^2) as calculated from the following:    Height as of 4/3/17: 5' (1.524 m).    Weight as of this encounter: 148 lb (67.1 kg).  Medication Reconciliation: complete   Jazzy Weems MA

## 2017-07-10 NOTE — PROGRESS NOTES
[address] [address]  [phone]  Dept: [phone]    PRE-OP EVALUATION:  Today's date: 7/10/2017    Pam Hartman (: 1935) presents for pre-operative evaluation assessment as requested by Dr. Skinner.  She requires evaluation and anesthesia risk assessment prior to undergoing surgery/procedure for treatment of right shoulder .  Proposed procedure: Right Reverse Total shoulder arthoplasty     Date of Surgery/ Procedure: 17  Time of Surgery/ Procedure: 11:00 am  Hospital/Surgical Facility: [facility]  Fax number for surgical facility: [facility]  Primary Physician: Satish Pena  Type of Anesthesia Anticipated: General    Patient has a Health Care Directive or Living Will:  YES scanned into chart    1. NO - Do you have a history of heart attack, stroke, stent, bypass or surgery on an artery in the head, neck, heart or legs?  2. NO - Do you ever have any pain or discomfort in your chest?  3. NO - Do you have a history of  Heart Failure?  4. NO - Are you troubled by shortness of breath when: walking on the level, up a slight hill or at night?  5. NO - Do you currently have a cold, bronchitis or other respiratory infection?  6. NO - Do you have a cough, shortness of breath or wheezing?  7. NO - Do you sometimes get pains in the calves of your legs when you walk?  8. NO - Do you or anyone in your family have previous history of blood clots?  9. NO - Do you or does anyone in your family have a serious bleeding problem such as prolonged bleeding following surgeries or cuts?  10. NO - Have you ever had problems with anemia or been told to take iron pills?  11. NO - Have you had any abnormal blood loss such as black, tarry or bloody stools, or abnormal vaginal bleeding?  12. NO - Have you ever had a blood transfusion?  13. NO - Have you or any of your relatives ever had problems with anesthesia?  14. NO -  Do you have sleep apnea, excessive snoring or daytime drowsiness?  15. NO - Do you have any prosthetic heart valves?  16. NO - Do you have prosthetic joints?  17. NO - Is there any chance that you may be pregnant?      HPI:                                                      Brief HPI related to upcoming procedure: right shoulder reverse replacement.    Sinus and septoplasty without complications,  No complications in the past.          MEDICAL HISTORY:                                                      Patient Active Problem List    Diagnosis Date Noted     Type 2 diabetes mellitus without complication (H) 10/21/2015     Priority: Medium     HTN, goal below 150/90 01/14/2015     Priority: Medium     Chronic maxillary sinusitis 03/10/2014     Priority: Medium     Rotator cuff tear arthropathy 01/07/2014     Priority: Medium     Advanced directives, counseling/discussion 12/02/2011     Priority: Medium     Patient states has Advance Directive and will bring in a copy to clinic. 12/2/2011   Myah Cardenas MA      12/27/11 Advance Care Directive received, reviewed as legal and will be scanned into patient's chart.  Ivory Negro MA  ACP Facilitator        11/14/2012  Advance Directive Follow-up Visit    Pam Hartman presents for advance care planning session accompanied by designated health care agent.  Reviewed definitions of advance care planning and advance directive form, and informational handouts given to patient previously.  Patient voices understanding of advance care planning and advance directive form    Designated healthcare agent is identified and accepts responsibility of this role.  Healthcare agent s name is Brian Hartman Sr..  Designated healthcare agent concerns:  None.  Health Care Directive/POLST reviewed and patient and designated healthcare agent are in agreement.  Finalized wishes are clear to both patient and designated healthcare agent: Yes.  Patient and designated healthcare agent are  aware that document may be changed at any time in the future.    Advance directive form: completed at this visit.  Advance directive form: HCD: notarized at this visit.  Original and copies of advance directive form given to patient and copy sent to  for scanning into EMR and original given to patient and instructed to give copy to designated [facility] and non-[place] physician where applicable.    Ivory Negro,   Clinical Medical Assistant,  Advance Care Plan. Facilitator                 Type 2 diabetes, HbA1C goal < 8% (H) 12/02/2011     Priority: Medium     ASCUS on Pap smear 11/04/2011     Priority: Medium     11/30/09  pap- ASCUS neg HPV. Plan-- per protocol, repeat screening pap in one year  12/1/10 pap NIL.       Health Care Home 08/02/2011     Priority: Medium     x  DX V65.8 REPLACED WITH 48963 [facility]ALTH CARE HOME (04/08/2013)       Psoriasis      Priority: Medium     IBS (irritable bowel syndrome)      Priority: Medium     HYPERLIPIDEMIA LDL GOAL <100 10/31/2010     Priority: Medium      Past Medical History:   Diagnosis Date     Colon polyp      Degenerative arthritis      DM II (diabetes mellitus, type II)      HTN      Hyperlipidemia LDL goal < 100      IBS (irritable bowel syndrome)      Osteopenia      Psoriasis      Type 2 diabetes mellitus without complication (H) 10/21/2015     Ulnar neuropathy      Past Surgical History:   Procedure Laterality Date     EYE SURGERY  7/2015    Left and right cataract repair     SEPTOPLASTY  1/16/2014    Procedure: SEPTOPLASTY;  Septoplasty, Left ethmoidectomy, Left endoscopic maxillary antrostomy, right kike bullosa;  Surgeon: Sarah Garcia MD;  Location:  OR     TONSILLECTOMY & ADENOIDECTOMY       Current Outpatient Prescriptions   Medication Sig Dispense Refill     blood glucose monitoring (NO BRAND SPECIFIED) test strip Use to test blood sugars 2 times daily or as directed 100 each 12     simvastatin (ZOCOR) 20 MG tablet Take 0.5 tablets (10 mg) by  mouth At Bedtime 90 tablet 4     amLODIPine (NORVASC) 5 MG tablet Take 1 tablet (5 mg) by mouth daily 90 tablet 3     metFORMIN (GLUCOPHAGE) 500 MG tablet Take 1 tablet (500 mg) by mouth daily (with breakfast) 90 tablet 3     furosemide (LASIX) 20 MG tablet Take 2 tablets (40 mg) by mouth daily (Patient taking differently: Take 20 mg by mouth daily ) 180 tablet 3     losartan (COZAAR) 100 MG tablet Take 1 tablet (100 mg) by mouth daily 90 tablet 3     blood glucose monitoring (NO BRAND SPECIFIED) meter device kit Use to test blood sugar 2 times daily or as directed. 1 kit 0     calcium carbonate-vitamin D 500-400 MG-UNIT TABS tablt ONE BY MOUTH TWICE A  tablet 3     Probiotic Product (PROBIOTIC PO)        Acetaminophen (TYLENOL PO) Take 2,000 mg by mouth daily        aspirin 81 MG tablet Take 1 tablet by mouth daily.       calcipotriene (DOVONEX) 0.005 % SOLN Apply  topically. APPLY bid AS NEEDED 1 Bottle 4     OTC products: None, except as noted above    Allergies   Allergen Reactions     Septra [Bactrim]      Sulfa Drugs Rash      Latex Allergy: NO    Social History   Substance Use Topics     Smoking status: Former Smoker     Quit date: 11/30/1984     Smokeless tobacco: Never Used     Alcohol use Yes      Comment: 2 DRINKS PER MONTH     History   Drug Use No       REVIEW OF SYSTEMS:                                                    C: NEGATIVE for fever, chills, change in weight  E/M: NEGATIVE for ear, mouth and throat problems  R: NEGATIVE for significant cough or SOB  CV: NEGATIVE for chest pain, palpitations or peripheral edema    EXAM:                                                    /70  Pulse 82  Temp 98.5  F (36.9  C) (Oral)  Wt 148 lb (67.1 kg)  SpO2 98%  BMI 28.9 kg/m2  GENERAL APPEARANCE: healthy, alert and no distress  HENT: ear canals and TM's normal and nose and mouth without ulcers or lesions  RESP: lungs clear to auscultation - no rales, rhonchi or wheezes  CV: regular rate and  rhythm, normal S1 S2, no S3 or S4 and no murmur, click or rub   ABDOMEN: soft, nontender, no HSM or masses and bowel sounds normal  NEURO: Normal strength and tone, sensory exam grossly normal, mentation intact and speech normal    DIAGNOSTICS:                                                    EKG: appears normal, NSR, normal axis, normal intervals, no acute ST/T changes c/w ischemia, no LVH by voltage criteria, unchanged from previous tracings  Serum Potassium  Serum Creatinine  Hemoglobin A1C    Recent Labs   Lab Test  11/28/16   0913  07/11/16   0935   11/26/12   0949   HGB   --    --    --   14.3   PLT   --    --    --   249   NA  140  141   < >  139   POTASSIUM  3.8  3.9   < >  3.9   CR  1.12*  0.98   < >  0.94   A1C  7.6*  7.6*   < >  6.9*    < > = values in this interval not displayed.        IMPRESSION:                                                    Reason for surgery/procedure:   Diagnosis/reason for consult:     The proposed surgical procedure is considered LOW risk.    REVISED CARDIAC RISK INDEX  The patient has the following serious cardiovascular risks for perioperative complications such as (MI, PE, VFib and 3  AV Block):  No serious cardiac risks  INTERPRETATION: 0 risks: Class I (very low risk - 0.4% complication rate)    The patient has the following additional risks for perioperative complications:  No identified additional risks      ICD-10-CM    1. Preop general physical exam Z01.818 EKG 12-lead complete w/read - Clinics     Basic metabolic panel     Hemoglobin       RECOMMENDATIONS:                                                      --Consult hospital rounder / IM to assist post-op medical management    1. Do not take metformin or lasix the morning of the procedure.  Make sure you take amlodipine before surgery  OK to stop aspirin 5 days before procedure.          APPROVAL GIVEN to proceed with proposed procedure, without further diagnostic evaluation       Signed Electronically by: Satish  MD Becky    Copy of this evaluation report is provided to requesting physician.    [place] Preop Guidelines

## 2017-07-10 NOTE — MR AVS SNAPSHOT
After Visit Summary   7/10/2017    Pam Hartman    MRN: [id number]           Patient Information     Date Of Birth          1935        Visit Information        Provider Department      7/10/2017 9:40 AM Satish Pena MD [facility]        Today's Diagnoses     Preop general physical exam    -  1      Care Instructions      Before Your Surgery      Call your surgeon if there is any change in your health. This includes signs of a cold or flu (such as a sore throat, runny nose, cough, rash or fever).    Do not smoke, drink alcohol or take over the counter medicine (unless your surgeon or primary care doctor tells you to) for the 24 hours before and after surgery.    If you take prescribed drugs: Follow your doctor s orders about which medicines to take and which to stop until after surgery.    Eating and drinking prior to surgery: follow the instructions from your surgeon    Take a shower or bath the night before surgery. Use the soap your surgeon gave you to gently clean your skin. If you do not have soap from your surgeon, use your regular soap. Do not shave or scrub the surgery site.  Wear clean pajamas and have clean sheets on your bed.           Follow-ups after your visit        Your next 10 appointments already scheduled     Aug 01, 2017  1:00 PM CDT   Return Visit with Nahun Skinner MD   [facility] ([facility])    [address] [place] [address] [phone]              Who to contact     If you have questions or need follow up information about today's clinic visit or your schedule please contact [facility] directly at [phone].  Normal or non-critical lab and imaging results will be communicated to you by MyChart, letter or phone within 4 business days after the clinic has received the results. If you do not hear from us within 7 days, please contact the clinic  through [employer] or phone. If you have a critical or abnormal lab result, we will notify you by phone as soon as possible.  Submit refill requests through [employer] or call your pharmacy and they will forward the refill request to us. Please allow 3 business days for your refill to be completed.          Additional Information About Your Visit        [employer] Information     [employer] gives you secure access to your electronic health record. If you see a primary care provider, you can also send messages to your care team and make appointments. If you have questions, please call your primary care clinic.  If you do not have a primary care provider, please call [phone] and they will assist you.        Care EveryWhere ID     This is your Care EveryWhere ID. This could be used by other organizations to access your [place] medical records  [id number]        Your Vitals Were     Pulse Temperature Pulse Oximetry BMI (Body Mass Index)          82 98.5  F (36.9  C) (Oral) 98% 28.9 kg/m2         Blood Pressure from Last 3 Encounters:   07/10/17 148/70   12/05/16 138/64   09/23/16 154/68    Weight from Last 3 Encounters:   07/10/17 148 lb (67.1 kg)   04/03/17 148 lb (67.1 kg)   01/02/17 149 lb (67.6 kg)              We Performed the Following     Basic metabolic panel     EKG 12-lead complete w/read - Clinics     Hemoglobin          Today's Medication Changes          These changes are accurate as of: 7/10/17 10:18 AM.  If you have any questions, ask your nurse or doctor.               These medicines have changed or have updated prescriptions.        Dose/Directions    blood glucose monitoring test strip   Commonly known as:  no brand specified   This may have changed:  Another medication with the same name was removed. Continue taking this medication, and follow the directions you see here.   Used for:  Angioedema, initial encounter, Glucose intolerance (impaired glucose tolerance)   Changed by:  Satish Pena MD         Use to test blood sugars 2 times daily or as directed   Quantity:  100 each   Refills:  12       furosemide 20 MG tablet   Commonly known as:  LASIX   This may have changed:  how much to take   Used for:  Generalized edema        Dose:  40 mg   Take 2 tablets (40 mg) by mouth daily   Quantity:  180 tablet   Refills:  3                Primary Care Provider Office Phone # Fax #    Satish Pena [phone]       [facility] [address]  [facility] [address]        Equal Access to Services     ANTHONY OSUNA : Hadii aad ku hadasho Soomaali, waaxda luqadaha, qaybta kaalmada adeegyada, waxay idiin hayaan adeeg kharaimelda lacolton . So [facility] [phone].    ATENCIÓN: Si habla español, tiene a armstrong disposición servicios gratuitos de asistencia lingüística. [facility] [phone].    We comply with applicable federal civil rights laws and [place] laws. We do not discriminate on the basis of race, color, national origin, age, disability sex, sexual orientation or gender identity.            Thank you!     Thank you for choosing [facility]  for your care. Our goal is always to provide you with excellent care. Hearing back from our patients is one way we can continue to improve our services. Please take a few minutes to complete the written survey that you may receive in the mail after your visit with us. Thank you!             Your Updated Medication List - Protect others around you: Learn how to safely use, store and throw away your medicines at www.disposemymeds.org.          This list is accurate as of: 7/10/17 10:18 AM.  Always use your most recent med list.                   Brand Name Dispense Instructions for use Diagnosis    amLODIPine 5 MG tablet    NORVASC    90 tablet    Take 1 tablet (5 mg) by mouth daily    Hypertension goal BP (blood pressure) < 140/90       aspirin 81 MG tablet      Take 1 tablet by mouth daily.        blood glucose monitoring  meter device kit    no brand specified    1 kit    Use to test blood sugar 2 times daily or as directed.    Glucose intolerance (impaired glucose tolerance), Angioedema, initial encounter       blood glucose monitoring test strip    no brand specified    100 each    Use to test blood sugars 2 times daily or as directed    Angioedema, initial encounter, Glucose intolerance (impaired glucose tolerance)       calcipotriene 0.005 % Soln solution    DOVONEX    1 Bottle    Apply  topically. APPLY bid AS NEEDED    Psoriasis       calcium carbonate-vitamin D 500-400 MG-UNIT Tabs per tablet     180 tablet    ONE BY MOUTH TWICE A DAY    Osteopenia, Type 2 diabetes mellitus without complication (H), Hyperlipidemia LDL goal <100, Special screening for malignant neoplasms, colon, Need for vaccination for Strep pneumoniae       furosemide 20 MG tablet    LASIX    180 tablet    Take 2 tablets (40 mg) by mouth daily    Generalized edema       losartan 100 MG tablet    COZAAR    90 tablet    Take 1 tablet (100 mg) by mouth daily    Screening for diabetic retinopathy, Essential hypertension with goal blood pressure less than 140/90       metFORMIN 500 MG tablet    GLUCOPHAGE    90 tablet    Take 1 tablet (500 mg) by mouth daily (with breakfast)    Type 2 diabetes mellitus without complication (H)       PROBIOTIC PO           simvastatin 20 MG tablet    ZOCOR    90 tablet    Take 0.5 tablets (10 mg) by mouth At Bedtime    Type 2 diabetes mellitus without complication, without long-term current use of insulin (H), Hyperlipidemia LDL goal <100       TYLENOL PO      Take 2,000 mg by mouth daily    Acute maxillary sinusitis, Right shoulder injury, initial encounter

## 2017-08-01 ENCOUNTER — OFFICE VISIT (OUTPATIENT)
Dept: ORTHOPEDICS | Facility: CLINIC | Age: 82
End: 2017-08-01
Payer: MEDICARE

## 2017-08-01 ENCOUNTER — RADIANT APPOINTMENT (OUTPATIENT)
Dept: GENERAL RADIOLOGY | Facility: CLINIC | Age: 82
End: 2017-08-01
Attending: ORTHOPAEDIC SURGERY
Payer: MEDICARE

## 2017-08-01 VITALS — BODY MASS INDEX: 28.86 KG/M2 | RESPIRATION RATE: 18 BRPM | TEMPERATURE: 98.3 F | WEIGHT: 147 LBS | HEIGHT: 60 IN

## 2017-08-01 DIAGNOSIS — Z96.611 STATUS POST REVERSE TOTAL ARTHROPLASTY OF RIGHT SHOULDER: ICD-10-CM

## 2017-08-01 DIAGNOSIS — Z96.611 STATUS POST REVERSE TOTAL ARTHROPLASTY OF RIGHT SHOULDER: Primary | ICD-10-CM

## 2017-08-01 PROCEDURE — 73030 X-RAY EXAM OF SHOULDER: CPT | Mod: RT

## 2017-08-01 PROCEDURE — 99024 POSTOP FOLLOW-UP VISIT: CPT | Performed by: ORTHOPAEDIC SURGERY

## 2017-08-01 NOTE — LETTER
8/1/2017         RE: Pam Hartman  [address]    [facility] [address]        Dear Colleague,    Thank you for referring your patient, Pam Hartman, to the [facility]. Please see a copy of my visit note below.    Follow up right reverse total shoulder arthroplasty on 7/19/17.  Had constipation due to pain pills.  Stopped the pain pills.  She is using a sling.  Is doing pendulum exercises.  Wound is healing well.  Sensation, circulation and motor are intact.  Xray shows good position of shoulder prosthesis.      Assessment:  right reverse total shoulder arthroplasty healing well.  Plan:  Start Physical Therapy for active and passive range of motion.  Limit external rotation to 30 degrees.  No resistive internal rotation.  Do not reach behind your back for another 2 weeks.  Do not push up out of chairs and bed for 4 weeks.  Start scar massage with vitamin-E cream.   Use aspirin 2 more weeks.  Return to clinic 4 weeks.  Medication renewal:  None # .          Again, thank you for allowing me to participate in the care of your patient.        Sincerely,        Nahun Skinner MD

## 2017-08-01 NOTE — PATIENT INSTRUCTIONS
Start Physical Therapy for active and passive range of motion.  Limit external rotation to 30 degrees.  No resistive internal rotation.  Do not reach behind your back for another 2 weeks.  Do not push up out of chairs and bed for 4 weeks.  Start scar massage with vitamin-E cream.   Use aspirin 2 more weeks.  Return to clinic 4 weeks.  Medication renewal:  None # .

## 2017-08-01 NOTE — PROGRESS NOTES
Follow up right reverse total shoulder arthroplasty on 7/19/17.  Had constipation due to pain pills.  Stopped the pain pills.  She is using a sling.  Is doing pendulum exercises.  Wound is healing well.  Sensation, circulation and motor are intact.  Xray shows good position of shoulder prosthesis.      Assessment:  right reverse total shoulder arthroplasty healing well.  Plan:  Start Physical Therapy for active and passive range of motion.  Limit external rotation to 30 degrees.  No resistive internal rotation.  Do not reach behind your back for another 2 weeks.  Do not push up out of chairs and bed for 4 weeks.  Start scar massage with vitamin-E cream.   Use aspirin 2 more weeks.  Return to clinic 4 weeks.  Medication renewal:  None # .

## 2017-08-01 NOTE — MR AVS SNAPSHOT
After Visit Summary   8/1/2017    Pam Hartman    MRN: [id number]           Patient Information     Date Of Birth          1935        Visit Information        Provider Department      8/1/2017 2:15 PM Nahun Skinner MD [facility]'s Diagnoses     Status post reverse total arthroplasty of right shoulder    -  1      Care Instructions    Start Physical Therapy for active and passive range of motion.  Limit external rotation to 30 degrees.  No resistive internal rotation.  Do not reach behind your back for another 2 weeks.  Do not push up out of chairs and bed for 4 weeks.  Start scar massage with vitamin-E cream.   Use aspirin 2 more weeks.  Return to clinic 4 weeks.  Medication renewal:  None # .          Follow-ups after your visit        Additional Services     [facility] PT, HAND, AND CHIROPRACTIC REFERRAL       **This order will print in the [facility] Scheduling Office**    Physical Therapy, Hand Therapy and Chiropractic Care are available through:    *[facility]  *[facility]  *[facility]    Call one number to schedule at any of the above locations: [phone].    Your provider has referred you to: Physical Therapy at [facility] or [facility]    Indication/Reason for Referral: SP right reverse total shoulder arthroplasty 7/19/17  Onset of Illness:   Therapy Orders: Evaluate and Treat  Special Programs: None  Special Request: 2 times weekly for 4 weeks    Lisha Husain      Additional Comments for the Therapist or Chiropractor: active and passive range of motion.  Limit external rotation to 30 degrees.  No resistive internal rotation.  Do not reach behind your back for another 2 weeks.  Do not push up out of chairs and bed for 4 weeks.      Please be aware that coverage of these services is subject to the terms and limitations of your health insurance plan.  Call member services at your health plan with any benefit  or coverage questions.      Please bring the following to your appointment:    *Your personal calendar for scheduling future appointments  *Comfortable clothing                  Who to contact     If you have questions or need follow up information about today's clinic visit or your schedule please contact [facility] directly at [phone].  Normal or non-critical lab and imaging results will be communicated to you by MyChart, letter or phone within 4 business days after the clinic has received the results. If you do not hear from us within 7 days, please contact the clinic through [employer] or phone. If you have a critical or abnormal lab result, we will notify you by phone as soon as possible.  Submit refill requests through [employer] or call your pharmacy and they will forward the refill request to us. Please allow 3 business days for your refill to be completed.          Additional Information About Your Visit        [employer] Information     [employer] gives you secure access to your electronic health record. If you see a primary care provider, you can also send messages to your care team and make appointments. If you have questions, please call your primary care clinic.  If you do not have a primary care provider, please call [phone] and they will assist you.        Care EveryWhere ID     This is your Care EveryWhere ID. This could be used by other organizations to access your [facility] medical records  [id number]        Your Vitals Were     Temperature Respirations Height BMI (Body Mass Index)          98.3  F (36.8  C) 18 1.524 m (5') 28.71 kg/m2         Blood Pressure from Last 3 Encounters:   07/10/17 148/70   12/05/16 138/64   09/23/16 154/68    Weight from Last 3 Encounters:   08/01/17 66.7 kg (147 lb)   07/10/17 67.1 kg (148 lb)   04/03/17 67.1 kg (148 lb)              We Performed the Following     [facility] PT, HAND, AND CHIROPRACTIC REFERRAL          Today's Medication Changes           These changes are accurate as of: 8/1/17  2:55 PM.  If you have any questions, ask your nurse or doctor.               These medicines have changed or have updated prescriptions.        Dose/Directions    furosemide 20 MG tablet   Commonly known as:  LASIX   This may have changed:  how much to take   Used for:  Generalized edema        Dose:  40 mg   Take 2 tablets (40 mg) by mouth daily   Quantity:  180 tablet   Refills:  3                Primary Care Provider Office Phone # Fax #    Satish Pena [phone]       [facility] [address]  [facility] [address]        Equal Access to Services     [facility]EX[facility]A : Hadii neda ku hadasho Soomaali, waaxda luqadaha, qaybta kaalmada adeegyada, bev chandler . So [facility] [phone].    ATENCIÓN: Si habla español, tiene a armstrong disposición servicios gratuitos de asistencia lingüística. Llame al [phone].    We comply with applicable federal civil rights laws and [place] laws. We do not discriminate on the basis of race, color, national origin, age, disability sex, sexual orientation or gender identity.            Thank you!     Thank you for choosing [facility]  for your care. Our goal is always to provide you with excellent care. Hearing back from our patients is one way we can continue to improve our services. Please take a few minutes to complete the written survey that you may receive in the mail after your visit with us. Thank you!             Your Updated Medication List - Protect others around you: Learn how to safely use, store and throw away your medicines at www.disposemymeds.org.          This list is accurate as of: 8/1/17  2:55 PM.  Always use your most recent med list.                   Brand Name Dispense Instructions for use Diagnosis    amLODIPine 5 MG tablet    NORVASC    90 tablet    Take 1 tablet (5 mg) by mouth daily    Hypertension goal BP (blood pressure)  < 140/90       aspirin 81 MG tablet      Take 1 tablet by mouth daily.        blood glucose monitoring meter device kit    no brand specified    1 kit    Use to test blood sugar 2 times daily or as directed.    Glucose intolerance (impaired glucose tolerance), Angioedema, initial encounter       blood glucose monitoring test strip    no brand specified    100 each    Use to test blood sugars 2 times daily or as directed    Angioedema, initial encounter, Glucose intolerance (impaired glucose tolerance)       calcipotriene 0.005 % Soln solution    DOVONEX    1 Bottle    Apply  topically. APPLY bid AS NEEDED    Psoriasis       calcium carbonate-vitamin D 500-400 MG-UNIT Tabs per tablet     180 tablet    ONE BY MOUTH TWICE A DAY    Osteopenia, Type 2 diabetes mellitus without complication (H), Hyperlipidemia LDL goal <100, Special screening for malignant neoplasms, colon, Need for vaccination for Strep pneumoniae       furosemide 20 MG tablet    LASIX    180 tablet    Take 2 tablets (40 mg) by mouth daily    Generalized edema       losartan 100 MG tablet    COZAAR    90 tablet    Take 1 tablet (100 mg) by mouth daily    Essential hypertension with goal blood pressure less than 140/90       metFORMIN 500 MG tablet    GLUCOPHAGE    90 tablet    Take 1 tablet (500 mg) by mouth daily (with breakfast)    Type 2 diabetes mellitus without complication, without long-term current use of insulin (H)       PROBIOTIC PO           simvastatin 20 MG tablet    ZOCOR    90 tablet    Take 0.5 tablets (10 mg) by mouth At Bedtime    Type 2 diabetes mellitus without complication, without long-term current use of insulin (H), Hyperlipidemia LDL goal <100       TYLENOL PO      Take 2,000 mg by mouth daily    Acute maxillary sinusitis, Right shoulder injury, initial encounter

## 2017-08-01 NOTE — NURSING NOTE
Chief Complaint   Patient presents with     RECHECK     Right TRSA on 7/19/17.       Initial Temp 98.3  F (36.8  C)  Resp 18  Ht 1.524 m (5')  Wt 66.7 kg (147 lb)  BMI 28.71 kg/m2 Estimated body mass index is 28.71 kg/(m^2) as calculated from the following:    Height as of this encounter: 1.524 m (5').    Weight as of this encounter: 66.7 kg (147 lb).  Medication Reconciliation: complete   Ivory Monzon MA

## 2017-08-08 ENCOUNTER — THERAPY VISIT (OUTPATIENT)
Dept: PHYSICAL THERAPY | Facility: CLINIC | Age: 82
End: 2017-08-08
Payer: MEDICARE

## 2017-08-08 DIAGNOSIS — Z51.89 AFTER CARE: ICD-10-CM

## 2017-08-08 DIAGNOSIS — Z96.611 S/P REVERSE TOTAL SHOULDER ARTHROPLASTY, RIGHT: Primary | ICD-10-CM

## 2017-08-08 PROCEDURE — 97161 PT EVAL LOW COMPLEX 20 MIN: CPT | Mod: GP | Performed by: PHYSICAL THERAPIST

## 2017-08-08 PROCEDURE — G8985 CARRY GOAL STATUS: HCPCS | Mod: GP | Performed by: PHYSICAL THERAPIST

## 2017-08-08 PROCEDURE — G8984 CARRY CURRENT STATUS: HCPCS | Mod: GP | Performed by: PHYSICAL THERAPIST

## 2017-08-08 PROCEDURE — 97110 THERAPEUTIC EXERCISES: CPT | Mod: GP | Performed by: PHYSICAL THERAPIST

## 2017-08-08 NOTE — LETTER
DEPARTMENT OF HEALTH AND HUMAN SERVICES  CENTERS FOR MEDICARE & MEDICAID SERVICES    PLAN/UPDATED PLAN OF PROGRESS FOR OUTPATIENT REHABILITATION    PATIENTS NAME:  Pam Hartman   : 1935  PROVIDER NUMBER:    [id number]    [id number]     PROVIDER NAME: [facility] [place] [facility]    MEDICAL RECORD NUMBER: [id number]     START OF CARE DATE:  SOC Date: 17   TYPE:  PT    PRIMARY/TREATMENT DIAGNOSIS: (Pertinent Medical Diagnosis)   S/p reverse total shoulder arthroplasty, right  After care    VISITS FROM START OF CARE:  Rxs Used: 1     [facility] Initial Evaluation    Subjective:  Patient is a 82 year old female presenting with rehab right shoulder hpi.   Pam Hartman is a 82 year old female with a right shoulder condition.  This is a new condition  S/p right reverse TSA 17.    Patient reports pain:  Anterior.    Pain is described as aching and is intermittent and reported as 4/10.  Associated symptoms:  Loss of strength and loss of motion/stiffness. Pain is the same all the time.  Symptoms are exacerbated by using arm behind back, using arm at shoulder level, using arm overhead, lifting, lying on extremity and carrying and relieved by ice and bracing/immobilizing.  Since onset symptoms are gradually improving.  Special testing: none since surgery.  General health as reported by patient is good.  Pertinent medical history includes:  Osteoarthritis, diabetes and high blood pressure.  Medical allergies: yes (sulfa).    Current medications:  High blood pressure medication and steroids.  Current occupation is Retired.      Barriers include:  None as reported by the patient.  Red flags:  None as reported by the patient.                  Objective:  Standing Alignment:    Shoulder/UE:  Rounded shoulders and atrophy UE general R (R UE in sling/immobilizer)  Flexibility/Screens:   Upper Extremity:    Decreased left upper extremity flexibility  at:  Pectoralis Major and Pectoralis Minor  Decreased right upper extremity flexibility present at:  Pectoralis Major and Pectoralis Minor          Shoulder Evaluation:  ROM:  AROM:    Flexion:  Right:  80 with shoulder hiking  PATIENTS NAME:  Pam Hartman   : 1935    Abduction:  Right:  80  with hiking  External Rotation:  Right:  10  Elbow Flexion:  Right:  WNL  Elbow Extension:  Right:  WNL  Extension/Internal Rotation:  Right:  To right glut    PROM:    Flexion:  Right: 90    Abduction:  Right:  90  External Rotation:  Right:  15   Pain: not limiting A/PROM  Endfeel: firm  Strength:    Flexion: Left:5/5    Pain: -    Right: 2-/5      Pain:  -/+  Extension:  Left: 5/5     Pain:-    Right: 2-/5     Pain:-/+  Abduction:  Left: 5/5   Pain:-    Right: 2-/5      Pain:-/+  Adduction:  Left: 5/5     Pain:-    Right: 5/5      Pain:-  External Rotation:   Left:4/5      Pain:-   Right:2-/5      Pain:-/+    Horizontal Adduction:  Right:/5     Pain:-  Elbow Flexion:  Left:5/5      Pain:-    Right:4-/5      Pain:-/+  Elbow Extension:  Left:5/5      Pain:-    Right:4/5      Pain:-  Palpation:    Right shoulder tenderness present at: Biceps; Deltoid and Incisional     Assessment/Plan:    Patient is a 82 year old female with right side shoulder complaints.    Patient has the following significant findings with corresponding treatment plan.                Diagnosis 1:  S/p reverse TSA  Pain -  hot/cold therapy, manual therapy, self management, education and home program  Decreased ROM/flexibility - manual therapy, therapeutic exercise, therapeutic activity and home program  Decreased joint mobility - manual therapy, therapeutic exercise, therapeutic activity and home program  Decreased strength - therapeutic exercise, therapeutic activities and home program  Decreased function - therapeutic activities and home program    Therapy Evaluation Codes:   1) History comprised of:   Personal factors that impact the plan of care:       None.    Comorbidity factors that impact the plan of care are:      Diabetes.     Medications impacting care: None.  2) Examination of Body Systems comprised of:   Body structures and functions that impact the plan of care:      Shoulder.   Activity limitations that impact the plan of care are:      Bathing, Cooking, Driving, Dressing, Lifting and Sleeping.  3) Clinical presentation characteristics are:   Stable/Uncomplicated.    PATIENTS NAME:  Pam Hartman   : 1935    4) Decision-Making    Low complexity using standardized patient assessment instrument and/or measureable assessment of functional outcome.  Cumulative Therapy Evaluation is: Low complexity.    Previous and current functional limitations:  (See Goal Flow Sheet for this information)    Short term and Long term goals: (See Goal Flow Sheet for this information)     Communication ability:  Patient appears to be able to clearly communicate and understand verbal and written communication and follow directions correctly.  Treatment Explanation - The following has been discussed with the patient:   RX ordered/plan of care  Anticipated outcomes  Possible risks and side effects  This patient would benefit from PT intervention to resume normal activities.   Rehab potential is good.    Frequency:  2 X week, once daily  Duration:  for 4 weeks  Discharge Plan:  Achieve all LTG.  Independent in home treatment program.  Reach maximal therapeutic benefit.    Caregiver Signature/Credentials _____________________________ Date ________       Treating Provider: Araceli Cantu,PT     I have reviewed and certified the need for these services and plan of treatment while under my care.        PHYSICIAN'S SIGNATURE:   _________________________________________  Date___________   Mike Agudelo    Certification period:  Beginning of Cert date period: 17 to  End of Cert period date: 17     Functional Level Progress Report: Please see  "attached \"Goal Flow sheet for Functional level.\"    ____X____ Continue Services or       ________ DC Services                Service dates: From  SOC Date: 08/08/17 date to present                         "

## 2017-08-08 NOTE — PROGRESS NOTES
[facility] Initial Evaluation      Subjective:    Patient is a 82 year old female presenting with rehab right shoulder hpi.   Pam Hartman is a 82 year old female with a right shoulder condition.      This is a new condition  S/p right reverse TSA 7/19/17.    Patient reports pain:  Anterior.    Pain is described as aching and is intermittent and reported as 4/10.  Associated symptoms:  Loss of strength and loss of motion/stiffness. Pain is the same all the time.  Symptoms are exacerbated by using arm behind back, using arm at shoulder level, using arm overhead, lifting, lying on extremity and carrying and relieved by ice and bracing/immobilizing.  Since onset symptoms are gradually improving.  Special testing: none since surgery.      General health as reported by patient is good.  Pertinent medical history includes:  Osteoarthritis, diabetes and high blood pressure.  Medical allergies: yes (sulfa).    Current medications:  High blood pressure medication and steroids.  Current occupation is Retired.        Barriers include:  None as reported by the patient.    Red flags:  None as reported by the patient.                        Objective:    Standing Alignment:      Shoulder/UE:  Rounded shoulders and atrophy UE general R (R UE in sling/immobilizer)                  Flexibility/Screens:     Upper Extremity:    Decreased left upper extremity flexibility at:  Pectoralis Major and Pectoralis Minor    Decreased right upper extremity flexibility present at:  Pectoralis Major and Pectoralis Minor                           Shoulder Evaluation:  ROM:  AROM:    Flexion:  Right:  80 with shoulder hiking    Abduction:  Right:  80  with hiking      External Rotation:  Right:  10      Elbow Flexion:  Right:  WNL  Elbow Extension:  Right:  WNL    Extension/Internal Rotation:  Right:  To right glut    PROM:    Flexion:  Right: 90      Abduction:  Right:  90      External Rotation:  Right:  15                 Pain: not limiting A/PROM  Endfeel: firm  Strength:    Flexion: Left:5/5    Pain: -    Right: 2-/5      Pain:  -/+  Extension:  Left: 5/5     Pain:-    Right: 2-/5     Pain:-/+  Abduction:  Left: 5/5   Pain:-    Right: 2-/5      Pain:-/+  Adduction:  Left: 5/5     Pain:-    Right: 5/5      Pain:-    External Rotation:   Left:4/5      Pain:-   Right:2-/5      Pain:-/+      Horizontal Adduction:  Right:/5     Pain:-  Elbow Flexion:  Left:5/5      Pain:-    Right:4-/5      Pain:-/+  Elbow Extension:  Left:5/5      Pain:-    Right:4/5      Pain:-      Palpation:      Right shoulder tenderness present at: Biceps; Deltoid and Incisional                                     General     ROS    Assessment/Plan:      Patient is a 82 year old female with right side shoulder complaints.    Patient has the following significant findings with corresponding treatment plan.                Diagnosis 1:  S/p reverse TSA  Pain -  hot/cold therapy, manual therapy, self management, education and home program  Decreased ROM/flexibility - manual therapy, therapeutic exercise, therapeutic activity and home program  Decreased joint mobility - manual therapy, therapeutic exercise, therapeutic activity and home program  Decreased strength - therapeutic exercise, therapeutic activities and home program  Decreased function - therapeutic activities and home program    Therapy Evaluation Codes:   1) History comprised of:   Personal factors that impact the plan of care:      None.    Comorbidity factors that impact the plan of care are:      Diabetes.     Medications impacting care: None.  2) Examination of Body Systems comprised of:   Body structures and functions that impact the plan of care:      Shoulder.   Activity limitations that impact the plan of care are:      Bathing, Cooking, Driving, Dressing, Lifting and Sleeping.  3) Clinical presentation characteristics are:   Stable/Uncomplicated.  4) Decision-Making    Low complexity using  standardized patient assessment instrument and/or measureable assessment of functional outcome.  Cumulative Therapy Evaluation is: Low complexity.    Previous and current functional limitations:  (See Goal Flow Sheet for this information)    Short term and Long term goals: (See Goal Flow Sheet for this information)     Communication ability:  Patient appears to be able to clearly communicate and understand verbal and written communication and follow directions correctly.  Treatment Explanation - The following has been discussed with the patient:   RX ordered/plan of care  Anticipated outcomes  Possible risks and side effects  This patient would benefit from PT intervention to resume normal activities.   Rehab potential is good.    Frequency:  2 X week, once daily  Duration:  for 4 weeks  Discharge Plan:  Achieve all LTG.  Independent in home treatment program.  Reach maximal therapeutic benefit.    Please refer to the daily flowsheet for treatment today, total treatment time and time spent performing 1:1 timed codes.

## 2017-08-08 NOTE — MR AVS SNAPSHOT
After Visit Summary   8/8/2017    Pam Hartman    MRN: [id number]           Patient Information     Date Of Birth          1935        Visit Information        Provider Department      8/8/2017 1:30 PM Araceli Cho, PT [facility] Physical [facility]        Today's Diagnoses     S/p reverse total shoulder arthroplasty, right    -  1    After care           Follow-ups after your visit        Your next 10 appointments already scheduled     Aug 10, 2017  8:20 AM CDT   DALTON Extremity with Araceli Mac PT   [place] [facility] Physical Therapy ([facility])    [address] [place] [address] [address] [place] [address]   [phone]            Aug 15, 2017  9:40 AM CDT   DALTON Extremity with Araceli Mac PT   [facility] Physical Therapy ([facility])    [address] [place] [address] [address] [place] [address]   [phone]            Aug 17, 2017  9:40 AM CDT   DALTON Extremity with Araceli Mac PT   [facility] Physical Therapy ([facility])    [address] [place] [address] [address] [place] [address]   [phone]            Aug 22, 2017  9:40 AM CDT   DALTON Extremity with Araceli Mac PT   [facility] Physical Therapy ([facility])    [address] [place] [address]   [phone]            Aug 24, 2017  9:40 AM CDT   DALTON Extremity with Araceli Mac PT   [place] [facility] Physical Therapy ([facility])    [address] [place] [address] [address] [place] [address]   [phone]            Aug 29, 2017  9:40 AM CDT   DALTON Extremity with Araceli Mac PT   [facility] Physical Therapy  ([facility])    [address]  [facility] [address]   [phone]            Aug 29, 2017  1:15 PM CDT   Return Visit with Nahun Skinner MD   [facility] ([facility])    [address]  [facility] [address]   [phone]            Aug 31, 2017  9:40 AM CDT   DALTON Extremity with Araceli Mac PT   [facility] Physical Therapy ([facility])    [address]  [facility] [address]   [phone]              Who to contact     If you have questions or need follow up information about today's clinic visit or your schedule please contact [facility] PHYSICAL THERAPY directly at [phone].  Normal or non-critical lab and imaging results will be communicated to you by MyChart, letter or phone within 4 business days after the clinic has received the results. If you do not hear from us within 7 days, please contact the clinic through [employer] or phone. If you have a critical or abnormal lab result, we will notify you by phone as soon as possible.  Submit refill requests through [employer] or call your pharmacy and they will forward the refill request to us. Please allow 3 business days for your refill to be completed.          Additional Information About Your Visit        MyChart Information     [employer] gives you secure access to your electronic health record. If you see a primary care provider, you can also send messages to your care team and make appointments. If you have questions, please call your primary care clinic.  If you do not have a primary care provider, please call [phone] and they will assist you.        Care EveryWhere ID     This is your Care EveryWhere ID. This could be used by other organizations to access your [facility] medical records  [id number]         Blood Pressure from Last 3  Encounters:   07/10/17 148/70   12/05/16 138/64   09/23/16 154/68    Weight from Last 3 Encounters:   08/01/17 66.7 kg (147 lb)   07/10/17 67.1 kg (148 lb)   04/03/17 67.1 kg (148 lb)              We Performed the Following     HC PT EVAL, LOW COMPLEXITY     DALTON CERT REPORT     DALTON INITIAL EVAL REPORT     THERAPEUTIC EXERCISES          Today's Medication Changes          These changes are accurate as of: 8/8/17  2:39 PM.  If you have any questions, ask your nurse or doctor.               These medicines have changed or have updated prescriptions.        Dose/Directions    furosemide 20 MG tablet   Commonly known as:  LASIX   This may have changed:  how much to take   Used for:  Generalized edema        Dose:  40 mg   Take 2 tablets (40 mg) by mouth daily   Quantity:  180 tablet   Refills:  3                Primary Care Provider Office Phone # Fax #    Satish Pena [phone]       [facility] [address] [place], [place]. \Hospital Has a New Name and Outlook.\"" [id number]        Equal Access to Services     EL OSUNA : Hadii neda gomes hadasho Soomaali, waaxda luqadaha, qaybta kaalmada adeegyada, waxay andrew chandler . So [facility] [place] [phone].    ATENCIÓN: Si habla español, tiene a armstrong disposición servicios gratuitos de asistencia lingüística. [facility] [phone].    We comply with applicable federal civil rights laws and [place] laws. We do not discriminate on the basis of race, color, national origin, age, disability sex, sexual orientation or gender identity.            Thank you!     Thank you for choosing [facility] PHYSICAL THERAPY  for your care. Our goal is always to provide you with excellent care. Hearing back from our patients is one way we can continue to improve our services. Please take a few minutes to complete the written survey that you may receive in the mail after your visit with us. Thank you!             Your Updated Medication List -  Protect others around you: Learn how to safely use, store and throw away your medicines at www.disposemymeds.org.          This list is accurate as of: 8/8/17  2:39 PM.  Always use your most recent med list.                   Brand Name Dispense Instructions for use Diagnosis    amLODIPine 5 MG tablet    NORVASC    90 tablet    Take 1 tablet (5 mg) by mouth daily    Hypertension goal BP (blood pressure) < 140/90       aspirin 81 MG tablet      Take 1 tablet by mouth daily.        blood glucose monitoring meter device kit    no brand specified    1 kit    Use to test blood sugar 2 times daily or as directed.    Glucose intolerance (impaired glucose tolerance), Angioedema, initial encounter       blood glucose monitoring test strip    no brand specified    100 each    Use to test blood sugars 2 times daily or as directed    Angioedema, initial encounter, Glucose intolerance (impaired glucose tolerance)       calcipotriene 0.005 % Soln solution    DOVONEX    1 Bottle    Apply  topically. APPLY bid AS NEEDED    Psoriasis       calcium carbonate-vitamin D 500-400 MG-UNIT Tabs per tablet     180 tablet    ONE BY MOUTH TWICE A DAY    Osteopenia, Type 2 diabetes mellitus without complication (H), Hyperlipidemia LDL goal <100, Special screening for malignant neoplasms, colon, Need for vaccination for Strep pneumoniae       furosemide 20 MG tablet    LASIX    180 tablet    Take 2 tablets (40 mg) by mouth daily    Generalized edema       losartan 100 MG tablet    COZAAR    90 tablet    Take 1 tablet (100 mg) by mouth daily    Essential hypertension with goal blood pressure less than 140/90       metFORMIN 500 MG tablet    GLUCOPHAGE    90 tablet    Take 1 tablet (500 mg) by mouth daily (with breakfast)    Type 2 diabetes mellitus without complication, without long-term current use of insulin (H)       PROBIOTIC PO           simvastatin 20 MG tablet    ZOCOR    90 tablet    Take 0.5 tablets (10 mg) by mouth At Bedtime    Type 2  diabetes mellitus without complication, without long-term current use of insulin (H), Hyperlipidemia LDL goal <100       TYLENOL PO      Take 2,000 mg by mouth daily    Acute maxillary sinusitis, Right shoulder injury, initial encounter

## 2017-08-10 ENCOUNTER — THERAPY VISIT (OUTPATIENT)
Dept: PHYSICAL THERAPY | Facility: CLINIC | Age: 82
End: 2017-08-10
Payer: MEDICARE

## 2017-08-10 DIAGNOSIS — Z96.611 S/P REVERSE TOTAL SHOULDER ARTHROPLASTY, RIGHT: ICD-10-CM

## 2017-08-10 DIAGNOSIS — Z51.89 AFTER CARE: ICD-10-CM

## 2017-08-10 PROCEDURE — 97110 THERAPEUTIC EXERCISES: CPT | Mod: GP | Performed by: PHYSICAL THERAPIST

## 2017-08-10 PROCEDURE — 97140 MANUAL THERAPY 1/> REGIONS: CPT | Mod: GP | Performed by: PHYSICAL THERAPIST

## 2017-08-15 ENCOUNTER — THERAPY VISIT (OUTPATIENT)
Dept: PHYSICAL THERAPY | Facility: CLINIC | Age: 82
End: 2017-08-15
Payer: MEDICARE

## 2017-08-15 DIAGNOSIS — Z96.611 S/P REVERSE TOTAL SHOULDER ARTHROPLASTY, RIGHT: ICD-10-CM

## 2017-08-15 DIAGNOSIS — Z51.89 AFTER CARE: ICD-10-CM

## 2017-08-15 PROCEDURE — 97140 MANUAL THERAPY 1/> REGIONS: CPT | Mod: GP | Performed by: PHYSICAL THERAPIST

## 2017-08-15 PROCEDURE — 97110 THERAPEUTIC EXERCISES: CPT | Mod: GP | Performed by: PHYSICAL THERAPIST

## 2017-08-17 ENCOUNTER — THERAPY VISIT (OUTPATIENT)
Dept: PHYSICAL THERAPY | Facility: CLINIC | Age: 82
End: 2017-08-17
Payer: MEDICARE

## 2017-08-17 DIAGNOSIS — Z51.89 AFTER CARE: ICD-10-CM

## 2017-08-17 DIAGNOSIS — Z96.611 S/P REVERSE TOTAL SHOULDER ARTHROPLASTY, RIGHT: ICD-10-CM

## 2017-08-17 PROCEDURE — 97110 THERAPEUTIC EXERCISES: CPT | Mod: GP | Performed by: PHYSICAL THERAPIST

## 2017-08-17 PROCEDURE — 97140 MANUAL THERAPY 1/> REGIONS: CPT | Mod: GP | Performed by: PHYSICAL THERAPIST

## 2017-08-22 ENCOUNTER — THERAPY VISIT (OUTPATIENT)
Dept: PHYSICAL THERAPY | Facility: CLINIC | Age: 82
End: 2017-08-22
Payer: MEDICARE

## 2017-08-22 DIAGNOSIS — Z96.611 S/P REVERSE TOTAL SHOULDER ARTHROPLASTY, RIGHT: ICD-10-CM

## 2017-08-22 DIAGNOSIS — Z51.89 AFTER CARE: ICD-10-CM

## 2017-08-22 PROCEDURE — 97110 THERAPEUTIC EXERCISES: CPT | Mod: GP | Performed by: PHYSICAL THERAPIST

## 2017-08-22 PROCEDURE — 97140 MANUAL THERAPY 1/> REGIONS: CPT | Mod: GP | Performed by: PHYSICAL THERAPIST

## 2017-08-24 ENCOUNTER — THERAPY VISIT (OUTPATIENT)
Dept: PHYSICAL THERAPY | Facility: CLINIC | Age: 82
End: 2017-08-24
Payer: MEDICARE

## 2017-08-24 DIAGNOSIS — Z96.611 S/P REVERSE TOTAL SHOULDER ARTHROPLASTY, RIGHT: ICD-10-CM

## 2017-08-24 DIAGNOSIS — Z51.89 AFTER CARE: ICD-10-CM

## 2017-08-24 PROCEDURE — G8984 CARRY CURRENT STATUS: HCPCS | Mod: GP | Performed by: PHYSICAL THERAPIST

## 2017-08-24 PROCEDURE — 97110 THERAPEUTIC EXERCISES: CPT | Mod: GP | Performed by: PHYSICAL THERAPIST

## 2017-08-24 PROCEDURE — G8985 CARRY GOAL STATUS: HCPCS | Mod: GP | Performed by: PHYSICAL THERAPIST

## 2017-08-24 PROCEDURE — 97140 MANUAL THERAPY 1/> REGIONS: CPT | Mod: GP | Performed by: PHYSICAL THERAPIST

## 2017-08-24 NOTE — LETTER
[facility] PHYSICAL THERAPY  [address]  [facility] [address]  [phone]    2017    Re: Pam Hartman   :   1935  MRN:  [id number]   REFERRING PHYSICIAN:   Mike Agudelo    [facility] PHYSICAL [facility]    Date of Initial Evaluation: 2017  Visits:  Rxs Used: 6  Reason for Referral:     S/p reverse total shoulder arthroplasty, right  After care    PROGRESS  REPORT  Progress reporting period is from 17 to 17.  Patient has completed 6 PT visits.      SUBJECTIVE  Subjective: Overall, patient noting ~40-50% improvement in right shoulder. Right shoulder pain experienced anteriorly. Is now able to do more for herself such as using both hands/UE's to wash her hair. Also doing more around the house. As a result of this, patient experiencing increased muscle tightness returning to clinic this AM. Post Rx improvement of this tightness. Seeing MD next week on 17.     Current Pain level: 2/10.     Initial Pain level: 4/10.   Changes in function:  Yes (See Goal flowsheet attached for changes in current functional level)  Adverse reaction to treatment or activity: activity - increased pain after increasing right UE activity at home both house work/personal hygiene activities.    OBJECTIVE  Objective: Improved AROM of left shoulder flexion 110 with hiking(from 80), abduction 105 with hiking(from 80), ER 30(from 10), IR/extension to R PSIS(from lateral glut). PROM of right shoulder >AROM by 10-15 degrees, except ER limited to 30. Diffuse tenderness about right shoulder area, but most prominent along scar area.   SPADI improved from 71 to 42.     ASSESSMENT/PLAN  Updated problem list and treatment plan: Diagnosis 1:  S/p reverse TSA  Pain -  hot/cold therapy, manual therapy, self management, education and home program  Decreased ROM/flexibility - manual therapy, therapeutic exercise and  home program  Decreased strength - therapeutic exercise, therapeutic activities and home program  Decreased function - therapeutic activities and home program  STG/LTGs have been met or progress has been made towards goals:  Yes (See Goal flow sheet completed today.)  Assessment of Progress: The patient's condition is improving.  Self Management Plans:  Patient has been instructed in a home treatment program.  Re: Pam Hartman   :   1935    Patient  has been instructed in self management of symptoms.  I have re-evaluated this patient and find that the nature, scope, duration and intensity of the therapy is appropriate for the medical condition of the patient.  Pam continues to require the following intervention to meet STG and LTG's:  PT    Recommendations:  This patient would benefit from continued therapy.     Frequency:  2 X week, once daily  Duration:  for 2 weeks(4 visits remaining on current MD orders). Additional orders will be needed to continue.    Thank you for your referral.    INQUIRIES  Therapist: Araceli Cantu, PT  [facility]  [address] [address]  Phone: [phone]  Fax: [phone]

## 2017-08-24 NOTE — MR AVS SNAPSHOT
After Visit Summary   8/24/2017    Pam Hartman    MRN: [id number]           Patient Information     Date Of Birth          1935        Visit Information        Provider Department      8/24/2017 9:40 AM Araceli Cho, PT [facility] Physical Therapy        Today's Diagnoses     S/p reverse total shoulder arthroplasty, right        After care           Follow-ups after your visit        Your next 10 appointments already scheduled     Aug 29, 2017  9:40 AM CDT   DALTON Extremity with Araceli Mac PT   [facility] Physical Therapy ([facility])    [address] [place] [address] [address]  [facility] [address]   [phone]            Aug 29, 2017  1:15 PM CDT   Return Visit with Nahun Skinner MD   [facility]    [address] [address]   [phone]            Aug 31, 2017  9:40 AM CDT   DALTON Extremity with Araceli Mac PT   [facility]    [address] [place] [address] [address]  [facility] [address]   [phone]              Who to contact     If you have questions or need follow up information about today's clinic visit or your schedule please contact [facility]IC [facility] PHYSICAL THERAPY directly at [phone].  Normal or non-critical lab and imaging results will be communicated to you by MyChart, letter or phone within 4 business days after the clinic has received the results. If you do not hear from us within 7 days, please contact the clinic through MyChart or phone. If you have a critical or abnormal lab result, we will notify you by phone as soon as possible.  Submit refill requests through [employer] or call your pharmacy and they will forward the refill request to us. Please  allow 3 business days for your refill to be completed.          Additional Information About Your Visit        MyChart Information     [employer] gives you secure access to your electronic health record. If you see a primary care provider, you can also send messages to your care team and make appointments. If you have questions, please call your primary care clinic.  If you do not have a primary care provider, please call [phone] and they will assist you.        Care EveryWhere ID     This is your Care EveryWhere ID. This could be used by other organizations to access your [facility] medical records  [id number]         Blood Pressure from Last 3 Encounters:   07/10/17 148/70   12/05/16 138/64   09/23/16 154/68    Weight from Last 3 Encounters:   08/01/17 66.7 kg (147 lb)   07/10/17 67.1 kg (148 lb)   04/03/17 67.1 kg (148 lb)              We Performed the Following     DALTON PROGRESS NOTES REPORT     MANUAL THER TECH,1+REGIONS,EA 15 MIN     THERAPEUTIC EXERCISES          Today's Medication Changes          These changes are accurate as of: 8/24/17 10:46 PM.  If you have any questions, ask your nurse or doctor.               These medicines have changed or have updated prescriptions.        Dose/Directions    furosemide 20 MG tablet   Commonly known as:  LASIX   This may have changed:  how much to take   Used for:  Generalized edema        Dose:  40 mg   Take 2 tablets (40 mg) by mouth daily   Quantity:  180 tablet   Refills:  3                Primary Care Provider Office Phone # Fax #    Satish Pena [phone]       [address] [address]        Equal Access to Services     [facility] AH: Hadii neda englando Somadyali, waaxda luqadaha, qaybta kaalmada adeegyada, bev rao. So [facility] [phone].    ATENCIÓN: Si habla español, tiene a armstrong disposición servicios gratuitos de asistencia lingüística. Llame al [phone].    We comply with  applicable federal civil rights laws and [place] laws. We do not discriminate on the basis of race, color, national origin, age, disability sex, sexual orientation or gender identity.            Thank you!     Thank you for choosing [facility] PHYSICAL THERAPY  for your care. Our goal is always to provide you with excellent care. Hearing back from our patients is one way we can continue to improve our services. Please take a few minutes to complete the written survey that you may receive in the mail after your visit with us. Thank you!             Your Updated Medication List - Protect others around you: Learn how to safely use, store and throw away your medicines at www.disposemymeds.org.          This list is accurate as of: 8/24/17 10:46 PM.  Always use your most recent med list.                   Brand Name Dispense Instructions for use Diagnosis    amLODIPine 5 MG tablet    NORVASC    90 tablet    Take 1 tablet (5 mg) by mouth daily    Hypertension goal BP (blood pressure) < 140/90       aspirin 81 MG tablet      Take 1 tablet by mouth daily.        blood glucose monitoring meter device kit    no brand specified    1 kit    Use to test blood sugar 2 times daily or as directed.    Glucose intolerance (impaired glucose tolerance), Angioedema, initial encounter       blood glucose monitoring test strip    no brand specified    100 each    Use to test blood sugars 2 times daily or as directed    Angioedema, initial encounter, Glucose intolerance (impaired glucose tolerance)       calcipotriene 0.005 % Soln solution    DOVONEX    1 Bottle    Apply  topically. APPLY bid AS NEEDED    Psoriasis       calcium carbonate-vitamin D 500-400 MG-UNIT Tabs per tablet     180 tablet    ONE BY MOUTH TWICE A DAY    Osteopenia, Type 2 diabetes mellitus without complication (H), Hyperlipidemia LDL goal <100, Special screening for malignant neoplasms, colon, Need for vaccination for Strep  pneumoniae       furosemide 20 MG tablet    LASIX    180 tablet    Take 2 tablets (40 mg) by mouth daily    Generalized edema       losartan 100 MG tablet    COZAAR    90 tablet    Take 1 tablet (100 mg) by mouth daily    Essential hypertension with goal blood pressure less than 140/90       metFORMIN 500 MG tablet    GLUCOPHAGE    90 tablet    Take 1 tablet (500 mg) by mouth daily (with breakfast)    Type 2 diabetes mellitus without complication, without long-term current use of insulin (H)       PROBIOTIC PO           simvastatin 20 MG tablet    ZOCOR    90 tablet    Take 0.5 tablets (10 mg) by mouth At Bedtime    Type 2 diabetes mellitus without complication, without long-term current use of insulin (H), Hyperlipidemia LDL goal <100       TYLENOL PO      Take 2,000 mg by mouth daily    Acute maxillary sinusitis, Right shoulder injury, initial encounter

## 2017-08-25 NOTE — PROGRESS NOTES
Subjective:    HPI                    Objective:    System    Physical Exam    General     ROS    Assessment/Plan:      PROGRESS  REPORT    Progress reporting period is from 8/8/17 to 8/24/17.  Patient has completed 6 PT visits.      SUBJECTIVE  Subjective: Overall, patient noting ~40-50% improvement in right shoulder. Right shoulder pain experienced anteriorly. Is now able to do more for herself such as using both hands/UE's to wash her hair. Also doing more around the house. As a result of this, patient experiencing increased muscle tightness returning to clinic this AM. Post Rx improvement of this tightness. Seeing MD next week on 8/29/17.     Current Pain level: 2/10.     Initial Pain level: 4/10.   Changes in function:  Yes (See Goal flowsheet attached for changes in current functional level)  Adverse reaction to treatment or activity: activity - increased pain after increasing right UE activity at home both house work/personal hygiene activities.    OBJECTIVE    Objective: Improved AROM of left shoulder flexion 110 with hiking(from 80), abduction 105 with hiking(from 80), ER 30(from 10), IR/extension to R PSIS(from lateral glut). PROM of right shoulder >AROM by 10-15 degrees, except ER limited to 30. Diffuse tenderness about right shoulder area, but most prominent along scar area.   SPADI improved from 71 to 42.     ASSESSMENT/PLAN  Updated problem list and treatment plan: Diagnosis 1:  S/p reverse TSA  Pain -  hot/cold therapy, manual therapy, self management, education and home program  Decreased ROM/flexibility - manual therapy, therapeutic exercise and home program  Decreased strength - therapeutic exercise, therapeutic activities and home program  Decreased function - therapeutic activities and home program  STG/LTGs have been met or progress has been made towards goals:  Yes (See Goal flow sheet completed today.)  Assessment of Progress: The patient's condition is improving.  Self Management Plans:   Patient has been instructed in a home treatment program.  Patient  has been instructed in self management of symptoms.  I have re-evaluated this patient and find that the nature, scope, duration and intensity of the therapy is appropriate for the medical condition of the patient.  Pam continues to require the following intervention to meet STG and LTG's:  PT    Recommendations:  This patient would benefit from continued therapy.     Frequency:  2 X week, once daily  Duration:  for 2 weeks(4 visits remaining on current MD orders). Additional orders will be needed to continue.          Please refer to the daily flowsheet for treatment today, total treatment time and time spent performing 1:1 timed codes.

## 2017-08-29 ENCOUNTER — OFFICE VISIT (OUTPATIENT)
Dept: ORTHOPEDICS | Facility: CLINIC | Age: 82
End: 2017-08-29
Payer: MEDICARE

## 2017-08-29 ENCOUNTER — THERAPY VISIT (OUTPATIENT)
Dept: PHYSICAL THERAPY | Facility: CLINIC | Age: 82
End: 2017-08-29
Payer: MEDICARE

## 2017-08-29 VITALS — HEIGHT: 60 IN | TEMPERATURE: 98.1 F | RESPIRATION RATE: 18 BRPM | BODY MASS INDEX: 28.47 KG/M2 | WEIGHT: 145 LBS

## 2017-08-29 DIAGNOSIS — Z51.89 AFTER CARE: ICD-10-CM

## 2017-08-29 DIAGNOSIS — Z96.611 S/P REVERSE TOTAL SHOULDER ARTHROPLASTY, RIGHT: Primary | ICD-10-CM

## 2017-08-29 DIAGNOSIS — Z96.611 S/P REVERSE TOTAL SHOULDER ARTHROPLASTY, RIGHT: ICD-10-CM

## 2017-08-29 PROCEDURE — 97110 THERAPEUTIC EXERCISES: CPT | Mod: GP | Performed by: PHYSICAL THERAPIST

## 2017-08-29 PROCEDURE — 99024 POSTOP FOLLOW-UP VISIT: CPT | Performed by: ORTHOPAEDIC SURGERY

## 2017-08-29 PROCEDURE — 97140 MANUAL THERAPY 1/> REGIONS: CPT | Mod: GP | Performed by: PHYSICAL THERAPIST

## 2017-08-29 PROCEDURE — 97112 NEUROMUSCULAR REEDUCATION: CPT | Mod: GP | Performed by: PHYSICAL THERAPIST

## 2017-08-29 NOTE — MR AVS SNAPSHOT
After Visit Summary   8/29/2017    Pam Hartman    MRN: [id number]           Patient Information     Date Of Birth          1935        Visit Information        Provider Department      8/29/2017 1:15 PM Nahun Skinner MD [facility]        Today's Diagnoses     S/p reverse total shoulder arthroplasty, right    -  1      Care Instructions    No restrictions on range of motion.  Continue range of motion, strengthening with Physical Therapy.  Return to clinic 4-6 weeks.  Continue scar massage.          Follow-ups after your visit        Additional Services     [facility] PT, HAND, AND CHIROPRACTIC REFERRAL       **This order will print in the [facility] Scheduling Office**    Physical Therapy, Hand Therapy and Chiropractic Care are available through:    *[facility]  *[facility]  *[facility]    Call one number to schedule at any of the above locations: [phone].    Your provider has referred you to: Physical Therapy at [facility] or [facility]    Indication/Reason for Referral: SP right reverse total shoulder arthroplasty 7/19/17  Onset of Illness:   Therapy Orders: Evaluate and Treat  Special Programs: None  Special Request: 1-2 times weekly for 4 weeks    Lisha Husain      Additional Comments for the Therapist or Chiropractor: range of motion and strengthening. No restrictions on range of motion    Please be aware that coverage of these services is subject to the terms and limitations of your health insurance plan.  Call member services at your health plan with any benefit or coverage questions.      Please bring the following to your appointment:    *Your personal calendar for scheduling future appointments  *Comfortable clothing                  Your next 10 appointments already scheduled     Aug 31, 2017  9:40 AM CDT   DALTON Extremity with Araceli Mac PT   [facility] Physical  Therapy ([facility] [place])    [address]  [facility] [id number] [phone]              Who to contact     If you have questions or need follow up information about today's clinic visit or your schedule please contact [facility] directly at [phone].  Normal or non-critical lab and imaging results will be communicated to you by MyChart, letter or phone within 4 business days after the clinic has received the results. If you do not hear from us within 7 days, please contact the clinic through [employer] or phone. If you have a critical or abnormal lab result, we will notify you by phone as soon as possible.  Submit refill requests through Via or call your pharmacy and they will forward the refill request to us. Please allow 3 business days for your refill to be completed.          Additional Information About Your Visit        [employer] Information     Via gives you secure access to your electronic health record. If you see a primary care provider, you can also send messages to your care team and make appointments. If you have questions, please call your primary care clinic.  If you do not have a primary care provider, please call [phone] and they will assist you.        Care EveryWhere ID     This is your Care EveryWhere ID. This could be used by other organizations to access your [facility] medical records  [id number]        Your Vitals Were     Temperature Respirations Height BMI (Body Mass Index)          98.1  F (36.7  C) 18 1.524 m (5') 28.32 kg/m2         Blood Pressure from Last 3 Encounters:   07/10/17 148/70   12/05/16 138/64   09/23/16 154/68    Weight from Last 3 Encounters:   08/29/17 65.8 kg (145 lb)   08/01/17 66.7 kg (147 lb)   07/10/17 67.1 kg (148 lb)              We Performed the Following     [facility] PT, HAND, AND CHIROPRACTIC REFERRAL          Today's Medication Changes          These changes are accurate as of: 8/29/17  1:54 PM.   If you have any questions, ask your nurse or doctor.               These medicines have changed or have updated prescriptions.        Dose/Directions    furosemide 20 MG tablet   Commonly known as:  LASIX   This may have changed:  how much to take   Used for:  Generalized edema        Dose:  40 mg   Take 2 tablets (40 mg) by mouth daily   Quantity:  180 tablet   Refills:  3                Primary Care Provider Office Phone # Fax #    Satish Pena [phone]       [address] [place]. \Hospital Has a [facility] Name and Outlook.\"" [id number]        Equal Access to Services     ANTHONY OSUNA : Hadii neda ku hadasho Soomaali, waaxda luqadaha, qaybta kaalmada adeegyada, waxay doronin hayaidan lyubov chandler . So [facility] [phone].    ATENCIÓN: Si habla español, tiene a armstrong disposición servicios gratuitos de asistencia lingüística. Llame al [phone].    We comply with applicable federal civil rights laws and [place] laws. We do not discriminate on the basis of race, color, national origin, age, disability sex, sexual orientation or gender identity.            Thank you!     Thank you for choosing [facility]  for your care. Our goal is always to provide you with excellent care. Hearing back from our patients is one way we can continue to improve our services. Please take a few minutes to complete the written survey that you may receive in the mail after your visit with us. Thank you!             Your Updated Medication List - Protect others around you: Learn how to safely use, store and throw away your medicines at www.disposemymeds.org.          This list is accurate as of: 8/29/17  1:54 PM.  Always use your most recent med list.                   Brand Name Dispense Instructions for use Diagnosis    amLODIPine 5 MG tablet    NORVASC    90 tablet    Take 1 tablet (5 mg) by mouth daily    Hypertension goal BP (blood pressure) < 140/90       aspirin 81 MG tablet      Take 1 tablet by mouth daily.         blood glucose monitoring meter device kit    no brand specified    1 kit    Use to test blood sugar 2 times daily or as directed.    Glucose intolerance (impaired glucose tolerance), Angioedema, initial encounter       blood glucose monitoring test strip    no brand specified    100 each    Use to test blood sugars 2 times daily or as directed    Angioedema, initial encounter, Glucose intolerance (impaired glucose tolerance)       calcipotriene 0.005 % Soln solution    DOVONEX    1 Bottle    Apply  topically. APPLY bid AS NEEDED    Psoriasis       calcium carbonate-vitamin D 500-400 MG-UNIT Tabs per tablet     180 tablet    ONE BY MOUTH TWICE A DAY    Osteopenia, Type 2 diabetes mellitus without complication (H), Hyperlipidemia LDL goal <100, Special screening for malignant neoplasms, colon, Need for vaccination for Strep pneumoniae       furosemide 20 MG tablet    LASIX    180 tablet    Take 2 tablets (40 mg) by mouth daily    Generalized edema       losartan 100 MG tablet    COZAAR    90 tablet    Take 1 tablet (100 mg) by mouth daily    Essential hypertension with goal blood pressure less than 140/90       metFORMIN 500 MG tablet    GLUCOPHAGE    90 tablet    Take 1 tablet (500 mg) by mouth daily (with breakfast)    Type 2 diabetes mellitus without complication, without long-term current use of insulin (H)       PROBIOTIC PO           simvastatin 20 MG tablet    ZOCOR    90 tablet    Take 0.5 tablets (10 mg) by mouth At Bedtime    Type 2 diabetes mellitus without complication, without long-term current use of insulin (H), Hyperlipidemia LDL goal <100       TYLENOL PO      Take 2,000 mg by mouth daily    Acute maxillary sinusitis, Right shoulder injury, initial encounter

## 2017-08-29 NOTE — PROGRESS NOTES
S/p right reverse total shoulder arthroplasty on 7/19/17.   She has no complaints.    Objective:  Vitals: Temp 98.1  F (36.7  C)  Resp 18  Ht 1.524 m (5')  Wt 65.8 kg (145 lb)  BMI 28.32 kg/m2  BMI= Body mass index is 28.32 kg/(m^2).   Physical Therapy report: flexion to 120 degrees, abduction 108 degrees, internal rotation to PSIS.  Wound still shows some scar adherence.    Assessment/Plan:  6 weeks Status post  right reverse total shoulder arthroplasty doing well.  Work on range of motion and strengthening.  No restrictions.  Medication renewal:  None # .  Return to clinic in 4-6 weeks.

## 2017-08-29 NOTE — LETTER
8/29/2017         RE: Pam Hartman  [address]    [place] [address]        Dear Colleague,    Thank you for referring your patient, Pam Hartman, to the [facility]. Please see a copy of my visit note below.    S/p right reverse total shoulder arthroplasty on 7/19/17.   She has no complaints.    Objective:  Vitals: Temp 98.1  F (36.7  C)  Resp 18  Ht 1.524 m (5')  Wt 65.8 kg (145 lb)  BMI 28.32 kg/m2  BMI= Body mass index is 28.32 kg/(m^2).   Physical Therapy report: flexion to 120 degrees, abduction 108 degrees, internal rotation to PSIS.  Wound still shows some scar adherence.    Assessment/Plan:  6 weeks Status post  right reverse total shoulder arthroplasty doing well.  Work on range of motion and strengthening.  No restrictions.  Medication renewal:  None # .  Return to clinic in 4-6 weeks.      Again, thank you for allowing me to participate in the care of your patient.        Sincerely,        Nahun Skinner MD

## 2017-08-29 NOTE — PATIENT INSTRUCTIONS
No restrictions on range of motion.  Continue range of motion, strengthening with Physical Therapy.  Return to clinic 4-6 weeks.  Continue scar massage.

## 2017-08-29 NOTE — NURSING NOTE
Chief Complaint   Patient presents with     RECHECK     Right RTSA on 7/19/17.       Initial Temp 98.1  F (36.7  C)  Resp 18  Ht 1.524 m (5')  Wt 65.8 kg (145 lb)  BMI 28.32 kg/m2 Estimated body mass index is 28.32 kg/(m^2) as calculated from the following:    Height as of this encounter: 1.524 m (5').    Weight as of this encounter: 65.8 kg (145 lb).  Medication Reconciliation: complete   Ivory Monzon MA

## 2017-08-31 ENCOUNTER — THERAPY VISIT (OUTPATIENT)
Dept: PHYSICAL THERAPY | Facility: CLINIC | Age: 82
End: 2017-08-31
Payer: MEDICARE

## 2017-08-31 DIAGNOSIS — Z51.89 AFTER CARE: ICD-10-CM

## 2017-08-31 DIAGNOSIS — Z96.611 S/P REVERSE TOTAL SHOULDER ARTHROPLASTY, RIGHT: ICD-10-CM

## 2017-08-31 PROCEDURE — 97140 MANUAL THERAPY 1/> REGIONS: CPT | Mod: GP | Performed by: PHYSICAL THERAPIST

## 2017-08-31 PROCEDURE — 97110 THERAPEUTIC EXERCISES: CPT | Mod: GP | Performed by: PHYSICAL THERAPIST

## 2017-08-31 PROCEDURE — 97112 NEUROMUSCULAR REEDUCATION: CPT | Mod: GP | Performed by: PHYSICAL THERAPIST

## 2017-09-08 ENCOUNTER — THERAPY VISIT (OUTPATIENT)
Dept: PHYSICAL THERAPY | Facility: CLINIC | Age: 82
End: 2017-09-08
Payer: MEDICARE

## 2017-09-08 DIAGNOSIS — Z51.89 AFTER CARE: ICD-10-CM

## 2017-09-08 DIAGNOSIS — Z96.611 S/P REVERSE TOTAL SHOULDER ARTHROPLASTY, RIGHT: ICD-10-CM

## 2017-09-08 PROCEDURE — 97110 THERAPEUTIC EXERCISES: CPT | Mod: GP | Performed by: PHYSICAL THERAPIST

## 2017-09-08 PROCEDURE — 97140 MANUAL THERAPY 1/> REGIONS: CPT | Mod: GP | Performed by: PHYSICAL THERAPIST

## 2017-09-08 PROCEDURE — 97112 NEUROMUSCULAR REEDUCATION: CPT | Mod: GP | Performed by: PHYSICAL THERAPIST

## 2017-09-15 ENCOUNTER — THERAPY VISIT (OUTPATIENT)
Dept: PHYSICAL THERAPY | Facility: CLINIC | Age: 82
End: 2017-09-15
Payer: MEDICARE

## 2017-09-15 DIAGNOSIS — Z51.89 AFTER CARE: ICD-10-CM

## 2017-09-15 DIAGNOSIS — Z96.611 S/P REVERSE TOTAL SHOULDER ARTHROPLASTY, RIGHT: ICD-10-CM

## 2017-09-15 PROCEDURE — 97110 THERAPEUTIC EXERCISES: CPT | Mod: GP | Performed by: PHYSICAL THERAPIST

## 2017-09-15 PROCEDURE — 97140 MANUAL THERAPY 1/> REGIONS: CPT | Mod: GP | Performed by: PHYSICAL THERAPIST

## 2017-09-19 ENCOUNTER — THERAPY VISIT (OUTPATIENT)
Dept: PHYSICAL THERAPY | Facility: CLINIC | Age: 82
End: 2017-09-19
Payer: MEDICARE

## 2017-09-19 DIAGNOSIS — Z51.89 AFTER CARE: ICD-10-CM

## 2017-09-19 DIAGNOSIS — Z96.611 S/P REVERSE TOTAL SHOULDER ARTHROPLASTY, RIGHT: ICD-10-CM

## 2017-09-19 PROCEDURE — 97140 MANUAL THERAPY 1/> REGIONS: CPT | Mod: GP | Performed by: PHYSICAL THERAPIST

## 2017-09-19 PROCEDURE — 97110 THERAPEUTIC EXERCISES: CPT | Mod: GP | Performed by: PHYSICAL THERAPIST

## 2017-09-22 ENCOUNTER — THERAPY VISIT (OUTPATIENT)
Dept: PHYSICAL THERAPY | Facility: CLINIC | Age: 82
End: 2017-09-22
Payer: MEDICARE

## 2017-09-22 DIAGNOSIS — Z96.611 S/P REVERSE TOTAL SHOULDER ARTHROPLASTY, RIGHT: ICD-10-CM

## 2017-09-22 DIAGNOSIS — Z51.89 AFTER CARE: ICD-10-CM

## 2017-09-22 PROCEDURE — 97140 MANUAL THERAPY 1/> REGIONS: CPT | Mod: GP | Performed by: PHYSICAL THERAPIST

## 2017-09-22 PROCEDURE — 97110 THERAPEUTIC EXERCISES: CPT | Mod: GP | Performed by: PHYSICAL THERAPIST

## 2017-09-26 ENCOUNTER — THERAPY VISIT (OUTPATIENT)
Dept: PHYSICAL THERAPY | Facility: CLINIC | Age: 82
End: 2017-09-26
Payer: MEDICARE

## 2017-09-26 DIAGNOSIS — Z51.89 AFTER CARE: ICD-10-CM

## 2017-09-26 DIAGNOSIS — Z96.611 S/P REVERSE TOTAL SHOULDER ARTHROPLASTY, RIGHT: ICD-10-CM

## 2017-09-26 PROCEDURE — 97140 MANUAL THERAPY 1/> REGIONS: CPT | Mod: GP | Performed by: PHYSICAL THERAPIST

## 2017-09-26 PROCEDURE — 97110 THERAPEUTIC EXERCISES: CPT | Mod: GP | Performed by: PHYSICAL THERAPIST

## 2017-09-29 ENCOUNTER — THERAPY VISIT (OUTPATIENT)
Dept: PHYSICAL THERAPY | Facility: CLINIC | Age: 82
End: 2017-09-29
Payer: MEDICARE

## 2017-09-29 DIAGNOSIS — Z51.89 AFTER CARE: ICD-10-CM

## 2017-09-29 DIAGNOSIS — Z96.611 S/P REVERSE TOTAL SHOULDER ARTHROPLASTY, RIGHT: ICD-10-CM

## 2017-09-29 PROCEDURE — 97110 THERAPEUTIC EXERCISES: CPT | Mod: GP | Performed by: PHYSICAL THERAPIST

## 2017-09-29 PROCEDURE — 97140 MANUAL THERAPY 1/> REGIONS: CPT | Mod: GP | Performed by: PHYSICAL THERAPIST

## 2017-10-06 ENCOUNTER — THERAPY VISIT (OUTPATIENT)
Dept: PHYSICAL THERAPY | Facility: CLINIC | Age: 82
End: 2017-10-06
Payer: MEDICARE

## 2017-10-06 DIAGNOSIS — Z51.89 AFTER CARE: ICD-10-CM

## 2017-10-06 DIAGNOSIS — Z96.611 S/P REVERSE TOTAL SHOULDER ARTHROPLASTY, RIGHT: ICD-10-CM

## 2017-10-06 PROCEDURE — 97110 THERAPEUTIC EXERCISES: CPT | Mod: GP | Performed by: PHYSICAL THERAPIST

## 2017-10-06 PROCEDURE — 97140 MANUAL THERAPY 1/> REGIONS: CPT | Mod: GP | Performed by: PHYSICAL THERAPIST

## 2017-10-12 ENCOUNTER — THERAPY VISIT (OUTPATIENT)
Dept: PHYSICAL THERAPY | Facility: CLINIC | Age: 82
End: 2017-10-12
Payer: MEDICARE

## 2017-10-12 DIAGNOSIS — Z96.611 S/P REVERSE TOTAL SHOULDER ARTHROPLASTY, RIGHT: ICD-10-CM

## 2017-10-12 DIAGNOSIS — Z51.89 AFTER CARE: ICD-10-CM

## 2017-10-12 PROCEDURE — 97140 MANUAL THERAPY 1/> REGIONS: CPT | Mod: GP | Performed by: PHYSICAL THERAPIST

## 2017-10-12 PROCEDURE — 97110 THERAPEUTIC EXERCISES: CPT | Mod: GP | Performed by: PHYSICAL THERAPIST

## 2017-10-12 PROCEDURE — G8984 CARRY CURRENT STATUS: HCPCS | Mod: GP | Performed by: PHYSICAL THERAPIST

## 2017-10-12 PROCEDURE — G8985 CARRY GOAL STATUS: HCPCS | Mod: GP | Performed by: PHYSICAL THERAPIST

## 2017-10-12 NOTE — MR AVS SNAPSHOT
After Visit Summary   10/12/2017    Pam Hartman    MRN: [id number]           Patient Information     Date Of Birth          1935        Visit Information        Provider Department      10/12/2017 1:30 PM Araceli Cho PT [facility]        Today's Diagnoses     S/P reverse total shoulder arthroplasty, right        After care           Follow-ups after your visit        Your next 10 appointments already scheduled     Oct 16, 2017  9:30 AM CDT   Return Visit with Nahun Skinner MD   [facility] ([facility])    [address] [place] [address]   [phone]            Oct 26, 2017  9:40 AM CDT   [facility] Extremity with Araceli Mac PT   [facility] Physical Therapy ([facility])    [address] [id number] [phone]              Who to contact     If you have questions or need follow up information about today's clinic visit or your schedule please contact [facility] PHYSICAL THERAPY directly at [phone].  Normal or non-critical lab and imaging results will be communicated to you by MyChart, letter or phone within 4 business days after the clinic has received the results. If you do not hear from us within 7 days, please contact the clinic through [employer] or phone. If you have a critical or abnormal lab result, we will notify you by phone as soon as possible.  Submit refill requests through [employer] or call your pharmacy and they will forward the refill request to us. Please allow 3 business days for your refill to be completed.          Additional Information About Your Visit        MyChart Information     [employer] gives you secure access to your electronic health record. If you see a primary care provider, you can also send messages to your care team  and make appointments. If you have questions, please call your primary care clinic.  If you do not have a primary care provider, please call [phone] and they will assist you.        Care EveryWhere ID     This is your Care EveryWhere ID. This could be used by other organizations to access your [facility] medical records  [id number]         Blood Pressure from Last 3 Encounters:   07/10/17 148/70   12/05/16 138/64   09/23/16 154/68    Weight from Last 3 Encounters:   08/29/17 65.8 kg (145 lb)   08/01/17 66.7 kg (147 lb)   07/10/17 67.1 kg (148 lb)              We Performed the Following     [facility] PROGRESS NOTES REPORT     MANUAL THER TECH,1+REGIONS,EA 15 MIN     THERAPEUTIC EXERCISES          Today's Medication Changes          These changes are accurate as of: 10/12/17 10:06 PM.  If you have any questions, ask your nurse or doctor.               These medicines have changed or have updated prescriptions.        Dose/Directions    furosemide 20 MG tablet   Commonly known as:  LASIX   This may have changed:  how much to take   Used for:  Generalized edema        Dose:  40 mg   Take 2 tablets (40 mg) by mouth daily   Quantity:  180 tablet   Refills:  3                Primary Care Provider Office Phone # Fax #    Satish Pena [phone]       [address] [address]        Equal Access to Services     ANTHONY OSUNA AH: Hadclarice englando [place], waaxda luqadaha, qaybta kaalmada adeegaraceli, bev rao. So [facility] [phone].    ATENCIÓN: Si habla español, tiene a armstrong disposición servicios gratuitos de asistencia lingüística. Llame al [phone].    We comply with applicable federal civil rights laws and [place] laws. We do not discriminate on the basis of race, color, national origin, age, disability, sex, sexual orientation, or gender identity.            Thank you!     Thank you for choosing [facility]  PHYSICAL THERAPY  for your care. Our goal is always to provide you with excellent care. Hearing back from our patients is one way we can continue to improve our services. Please take a few minutes to complete the written survey that you may receive in the mail after your visit with us. Thank you!             Your Updated Medication List - Protect others around you: Learn how to safely use, store and throw away your medicines at www.disposemymeds.org.          This list is accurate as of: 10/12/17 10:06 PM.  Always use your most recent med list.                   Brand Name Dispense Instructions for use Diagnosis    amLODIPine 5 MG tablet    NORVASC    90 tablet    Take 1 tablet (5 mg) by mouth daily    Hypertension goal BP (blood pressure) < 140/90       aspirin 81 MG tablet      Take 1 tablet by mouth daily.        blood glucose monitoring meter device kit    no brand specified    1 kit    Use to test blood sugar 2 times daily or as directed.    Glucose intolerance (impaired glucose tolerance), Angioedema, initial encounter       blood glucose monitoring test strip    no brand specified    100 each    Use to test blood sugars 2 times daily or as directed    Angioedema, initial encounter, Glucose intolerance (impaired glucose tolerance)       calcipotriene 0.005 % Soln solution    DOVONEX    1 Bottle    Apply  topically. APPLY bid AS NEEDED    Psoriasis       calcium carbonate-vitamin D 500-400 MG-UNIT Tabs per tablet     180 tablet    ONE BY MOUTH TWICE A DAY    Osteopenia, Type 2 diabetes mellitus without complication (H), Hyperlipidemia LDL goal <100, Special screening for malignant neoplasms, colon, Need for vaccination for Strep pneumoniae       furosemide 20 MG tablet    LASIX    180 tablet    Take 2 tablets (40 mg) by mouth daily    Generalized edema       losartan 100 MG tablet    COZAAR    90 tablet    Take 1 tablet (100 mg) by mouth daily    Essential hypertension with goal blood pressure less than 140/90        metFORMIN 500 MG tablet    GLUCOPHAGE    90 tablet    Take 1 tablet (500 mg) by mouth daily (with breakfast)    Type 2 diabetes mellitus without complication, without long-term current use of insulin (H)       PROBIOTIC PO           simvastatin 20 MG tablet    ZOCOR    90 tablet    Take 0.5 tablets (10 mg) by mouth At Bedtime    Type 2 diabetes mellitus without complication, without long-term current use of insulin (H), Hyperlipidemia LDL goal <100       TYLENOL PO      Take 2,000 mg by mouth daily    Acute maxillary sinusitis, Right shoulder injury, initial encounter

## 2017-10-12 NOTE — LETTER
"[facility] PHYSICAL THERAPY  [address]  [place] [address]  [phone]    2017    Re: Pam Hartman   :   1935  MRN:  [id number]   REFERRING PHYSICIAN:   Mike Agudelo    [facility] PHYSICAL THERAPY    Date of Initial Evaluation:  2017  Visits:  Rxs Used: 16  Reason for Referral:     S/P reverse total shoulder arthroplasty, right  After care    PROGRESS  REPORT/RE-CERTIFICATION  Progress reporting period is from 17 to 10/12/17.       SUBJECTIVE  Subjective: Patient feeling 85% improved since having her R reverse TSA. Patient recently had a disruption  performing her HEP this week due to her  being hospitalized for 3-4 days. Using her right UE while driving is now much less painful. Biggest concern for pt remains her inability to reach behind her back. \"It just will not go back that way.\" Attempts to reach behind her back is very painful(3-4/10) along anterior shoulder down the biceps to just above the elbow. Seeing MD next Monday.     Current pain level is 0/10  .     Initial Pain level: 4/10.   Changes in function:  Yes (See Goal flowsheet attached for changes in current functional level)  Adverse reaction to treatment or activity: None    OBJECTIVE  Objective: Overall improved AROM of R shoulder: flexion 127(from 110) with hiking, abduction 120(from 105) with hiking, ER 50(from 30), IR/ext central L-S(from R PSIS). PROM >AROM. Passive right shoulder IR stretching also produces similar pain pattern as with IR/ext actively.  R shoulder strength grossly 3-/5, right shoulder is able to take some resistance at mid range of each motion(3+/5). Reduced diffuse tenderness about right shoulder/scapular areas, scar no longer tender, SPADI improved from 41.67% to 27.69%.    ASSESSMENT/PLAN  Updated problem list and treatment plan: Diagnosis 1:  S/p Right reverse TSA  Pain -  " manual therapy, self management, education and home program  Decreased ROM/flexibility - manual therapy, therapeutic exercise, therapeutic activity and home program  Decreased strength - therapeutic exercise, therapeutic activities and home program  Decreased function - therapeutic activities and home program  Re: Pam Hartman   :   1935    STG/LTGs have been met or progress has been made towards goals:  Yes (See Goal flow sheet completed today.)  Assessment of Progress: The patient's condition is improving.  The patient's condition has potential to improve.  Self Management Plans:  Patient has been instructed in a home treatment program.  Patient  has been instructed in self management of symptoms.  I have re-evaluated this patient and find that the nature, scope, duration and intensity of the therapy is appropriate for the medical condition of the patient.  Pam continues to require the following intervention to meet STG and LTG's:  PT    Recommendations:  This patient would benefit from continued therapy.     Frequency:  1 X week, every other week once daily  Duration:  for 8 weeks(4 additional PT visits)    Thank you for your referral.    INQUIRIES  Therapist: Araceli Cantu, PT  [facility]  [address] [address]  Phone: [phone]  Fax: [phone]

## 2017-10-13 NOTE — PROGRESS NOTES
"Subjective:    HPI                    Objective:    System    Physical Exam    General     ROS    Assessment/Plan:      PROGRESS  REPORT/RE-CERTIFICATION    Progress reporting period is from 8/24/17 to 10/12/17.       SUBJECTIVE  Subjective: Patient feeling 85% improved since having her R reverse TSA. Patient recently had a disruption  performing her HEP this week due to her  being hospitalized for 3-4 days. Using her right UE while driving is now much less painful. Biggest concern for pt remains her inability to reach behind her back. \"It just will not go back that way.\" Attempts to reach behind her back is very painful(3-4/10) along anterior shoulder down the biceps to just above the elbow. Seeing MD next Monday.     Current pain level is 0/10  .     Initial Pain level: 4/10.   Changes in function:  Yes (See Goal flowsheet attached for changes in current functional level)  Adverse reaction to treatment or activity: None    OBJECTIVE    Objective: Overall improved AROM of R shoulder: flexion 127(from 110) with hiking, abduction 120(from 105) with hiking, ER 50(from 30), IR/ext central L-S(from R PSIS). PROM >AROM. Passive right shoulder IR stretching also produces similar pain pattern as with IR/ext actively.  R shoulder strength grossly 3-/5, right shoulder is able to take some resistance at mid range of each motion(3+/5). Reduced diffuse tenderness about right shoulder/scapular areas, scar no longer tender, SPADI improved from 41.67% to 27.69%.    ASSESSMENT/PLAN  Updated problem list and treatment plan: Diagnosis 1:  S/p Right reverse TSA  Pain -  manual therapy, self management, education and home program  Decreased ROM/flexibility - manual therapy, therapeutic exercise, therapeutic activity and home program  Decreased strength - therapeutic exercise, therapeutic activities and home program  Decreased function - therapeutic activities and home program  STG/LTGs have been met or progress has been made " towards goals:  Yes (See Goal flow sheet completed today.)  Assessment of Progress: The patient's condition is improving.  The patient's condition has potential to improve.  Self Management Plans:  Patient has been instructed in a home treatment program.  Patient  has been instructed in self management of symptoms.  I have re-evaluated this patient and find that the nature, scope, duration and intensity of the therapy is appropriate for the medical condition of the patient.  Pam continues to require the following intervention to meet STG and LTG's:  PT    Recommendations:  This patient would benefit from continued therapy.     Frequency:  1 X week, every other week once daily  Duration:  for 8 weeks(4 additional PT visits)          Please refer to the daily flowsheet for treatment today, total treatment time and time spent performing 1:1 timed codes.

## 2017-10-16 ENCOUNTER — OFFICE VISIT (OUTPATIENT)
Dept: ORTHOPEDICS | Facility: CLINIC | Age: 82
End: 2017-10-16
Payer: MEDICARE

## 2017-10-16 VITALS — WEIGHT: 147 LBS | RESPIRATION RATE: 18 BRPM | HEIGHT: 60 IN | BODY MASS INDEX: 28.86 KG/M2 | TEMPERATURE: 98 F

## 2017-10-16 DIAGNOSIS — Z96.611 S/P REVERSE TOTAL SHOULDER ARTHROPLASTY, RIGHT: Primary | ICD-10-CM

## 2017-10-16 PROCEDURE — 99024 POSTOP FOLLOW-UP VISIT: CPT | Performed by: ORTHOPAEDIC SURGERY

## 2017-10-16 RX ORDER — AMOXICILLIN 500 MG/1
2000 CAPSULE ORAL ONCE
Qty: 8 CAPSULE | Refills: 6 | Status: SHIPPED | OUTPATIENT
Start: 2017-10-16 | End: 2017-10-16

## 2017-10-16 NOTE — PATIENT INSTRUCTIONS
Return to clinic 1 year (July 2018) for x-ray.  Use antibiotics before dental work.  Continue to work gently on range of motion.

## 2017-10-16 NOTE — MR AVS SNAPSHOT
After Visit Summary   10/16/2017    Pam Hartman    MRN: [id number]           Patient Information     Date Of Birth          1935        Visit Information        Provider Department      10/16/2017 9:30 AM Nahun Skinner MD [facility]        Today's Diagnoses     S/P reverse total shoulder arthroplasty, right    -  1      Care Instructions    Return to clinic 1 year (July 2018) for x-ray.  Use antibiotics before dental work.  Continue to work gently on range of motion.          Follow-ups after your visit        Your next 10 appointments already scheduled     Oct 26, 2017  9:40 AM CDT   [facility] Extremity with Araceli Mac PT   [place] [facility]    [address] [address] [phone]              Who to contact     If you have questions or need follow up information about today's clinic visit or your schedule please contact [facility] directly at [phone].  Normal or non-critical lab and imaging results will be communicated to you by Bolocohart, letter or phone within 4 business days after the clinic has received the results. If you do not hear from us within 7 days, please contact the clinic through Bolocohart or phone. If you have a critical or abnormal lab result, we will notify you by phone as soon as possible.  Submit refill requests through Artaic or call your pharmacy and they will forward the refill request to us. Please allow 3 business days for your refill to be completed.          Additional Information About Your Visit        Bolocohart Information     Artaic gives you secure access to your electronic health record. If you see a primary care provider, you can also send messages to your care team and make appointments. If you have questions, please call your primary care clinic.  If you do not have a primary care provider, please call  [phone] and they will assist you.        Care EveryWhere ID     This is your Care EveryWhere ID. This could be used by other organizations to access your [place] medical records  [id number]        Your Vitals Were     Temperature Respirations Height BMI (Body Mass Index)          98  F (36.7  C) 18 1.524 m (5') 28.71 kg/m2         Blood Pressure from Last 3 Encounters:   07/10/17 148/70   12/05/16 138/64   09/23/16 154/68    Weight from Last 3 Encounters:   10/16/17 66.7 kg (147 lb)   08/29/17 65.8 kg (145 lb)   08/01/17 66.7 kg (147 lb)              Today, you had the following     No orders found for display         Today's Medication Changes          These changes are accurate as of: 10/16/17 10:08 AM.  If you have any questions, ask your nurse or doctor.               Start taking these medicines.        Dose/Directions    amoxicillin 500 MG capsule   Commonly known as:  AMOXIL   Used for:  S/P reverse total shoulder arthroplasty, right   Started by:  Nahun Skinner MD        Dose:  2000 mg   Take 4 capsules (2,000 mg) by mouth once for 1 dose 1 hour prior to procedure.   Quantity:  8 capsule   Refills:  6         These medicines have changed or have updated prescriptions.        Dose/Directions    furosemide 20 MG tablet   Commonly known as:  LASIX   This may have changed:  how much to take   Used for:  Generalized edema        Dose:  40 mg   Take 2 tablets (40 mg) by mouth daily   Quantity:  180 tablet   Refills:  3            Where to get your medicines      Some of these will need a paper prescription and others can be bought over the counter.  Ask your nurse if you have questions.     Bring a paper prescription for each of these medications     amoxicillin 500 MG capsule                Primary Care Provider Office Phone # Fax #    Satish Pena [phone]       [address] [facility] [address]        Equal Access to Services     ANTHONY PEREZ: Josh gomes  michael Wood, cecy dowabrahamha, qadreta kablane rangel, bev doronin hayaan konstantinprince chaitanyaanderson laerinnvipul maira. So [facility] [phone].    ATENCIÓN: Si habla ely, tiene a armstrong disposición servicios gratuitos de asistencia lingüística. Miguelina al [phone].    We comply with applicable federal civil rights laws and [place] laws. We do not discriminate on the basis of race, color, national origin, age, disability, sex, sexual orientation, or gender identity.            Thank you!     Thank you for choosing [facility] [place]  for your care. Our goal is always to provide you with excellent care. Hearing back from our patients is one way we can continue to improve our services. Please take a few minutes to complete the written survey that you may receive in the mail after your visit with us. Thank you!             Your Updated Medication List - Protect others around you: Learn how to safely use, store and throw away your medicines at www.disposemymeds.org.          This list is accurate as of: 10/16/17 10:08 AM.  Always use your most recent med list.                   Brand Name Dispense Instructions for use Diagnosis    amLODIPine 5 MG tablet    NORVASC    90 tablet    Take 1 tablet (5 mg) by mouth daily    Hypertension goal BP (blood pressure) < 140/90       amoxicillin 500 MG capsule    AMOXIL    8 capsule    Take 4 capsules (2,000 mg) by mouth once for 1 dose 1 hour prior to procedure.    S/P reverse total shoulder arthroplasty, right       aspirin 81 MG tablet      Take 1 tablet by mouth daily.        blood glucose monitoring meter device kit    no brand specified    1 kit    Use to test blood sugar 2 times daily or as directed.    Glucose intolerance (impaired glucose tolerance), Angioedema, initial encounter       blood glucose monitoring test strip    no brand specified    100 each    Use to test blood sugars 2 times daily or as directed    Angioedema, initial encounter, Glucose intolerance (impaired glucose  tolerance)       calcipotriene 0.005 % Soln solution    DOVONEX    1 Bottle    Apply  topically. APPLY bid AS NEEDED    Psoriasis       calcium carbonate-vitamin D 500-400 MG-UNIT Tabs per tablet     180 tablet    ONE BY MOUTH TWICE A DAY    Osteopenia, Type 2 diabetes mellitus without complication (H), Hyperlipidemia LDL goal <100, Special screening for malignant neoplasms, colon, Need for vaccination for Strep pneumoniae       furosemide 20 MG tablet    LASIX    180 tablet    Take 2 tablets (40 mg) by mouth daily    Generalized edema       losartan 100 MG tablet    COZAAR    90 tablet    Take 1 tablet (100 mg) by mouth daily    Essential hypertension with goal blood pressure less than 140/90       metFORMIN 500 MG tablet    GLUCOPHAGE    90 tablet    Take 1 tablet (500 mg) by mouth daily (with breakfast)    Type 2 diabetes mellitus without complication, without long-term current use of insulin (H)       PROBIOTIC PO           simvastatin 20 MG tablet    ZOCOR    90 tablet    Take 0.5 tablets (10 mg) by mouth At Bedtime    Type 2 diabetes mellitus without complication, without long-term current use of insulin (H), Hyperlipidemia LDL goal <100       TYLENOL PO      Take 2,000 mg by mouth daily    Acute maxillary sinusitis, Right shoulder injury, initial encounter

## 2017-10-16 NOTE — LETTER
10/16/2017         RE: Pam Hartman  [address]    [place] [address]        Dear Colleague,    Thank you for referring your patient, Pam Hartman, to the [facility]. Please see a copy of my visit note below.    S/p right reverse total shoulder arthroplasty on 7/19/17.   She has no complaints.    Objective:  Vitals: Temp 98  F (36.7  C)  Resp 18  Ht 1.524 m (5')  Wt 66.7 kg (147 lb)  BMI 28.71 kg/m2  BMI= Body mass index is 28.71 kg/(m^2).   Physical Therapy report: flexion to 127 degrees, abduction 120 degrees, external rotation 50 degrees, internal rotation to L5  Wound healing well with minimal scar adherence.    Assessment/Plan:  3 months Status post  right reverse total shoulder arthroplasty doing well.  Physical Therapy would like to see her biweekly for 2 months.  Work on range of motion and strengthening.  No restrictions.  Medication renewal:  None # .  Return to clinic in July 2018 for x-ray right shoulder.      Again, thank you for allowing me to participate in the care of your patient.        Sincerely,        Nahun Skinner MD

## 2017-10-16 NOTE — NURSING NOTE
Chief Complaint   Patient presents with     RECHECK     Right RTSA on 7/19/17.       Initial Temp 98  F (36.7  C)  Resp 18  Ht 1.524 m (5')  Wt 66.7 kg (147 lb)  BMI 28.71 kg/m2 Estimated body mass index is 28.71 kg/(m^2) as calculated from the following:    Height as of this encounter: 1.524 m (5').    Weight as of this encounter: 66.7 kg (147 lb).  Medication Reconciliation: complete   Ivory Monzon MA

## 2017-10-16 NOTE — PROGRESS NOTES
S/p right reverse total shoulder arthroplasty on 7/19/17.   She has no complaints.    Objective:  Vitals: Temp 98  F (36.7  C)  Resp 18  Ht 1.524 m (5')  Wt 66.7 kg (147 lb)  BMI 28.71 kg/m2  BMI= Body mass index is 28.71 kg/(m^2).   Physical Therapy report: flexion to 127 degrees, abduction 120 degrees, external rotation 50 degrees, internal rotation to L5  Wound healing well with minimal scar adherence.    Assessment/Plan:  3 months Status post  right reverse total shoulder arthroplasty doing well.  Physical Therapy would like to see her biweekly for 2 months.  Work on range of motion and strengthening.  No restrictions.  Medication renewal:  None # .  Return to clinic in July 2018 for x-ray right shoulder.

## 2017-10-26 ENCOUNTER — THERAPY VISIT (OUTPATIENT)
Dept: PHYSICAL THERAPY | Facility: CLINIC | Age: 82
End: 2017-10-26
Payer: MEDICARE

## 2017-10-26 DIAGNOSIS — Z96.611 S/P REVERSE TOTAL SHOULDER ARTHROPLASTY, RIGHT: ICD-10-CM

## 2017-10-26 DIAGNOSIS — Z51.89 AFTER CARE: ICD-10-CM

## 2017-10-26 PROCEDURE — 97140 MANUAL THERAPY 1/> REGIONS: CPT | Mod: GP | Performed by: PHYSICAL THERAPIST

## 2017-10-26 PROCEDURE — 97110 THERAPEUTIC EXERCISES: CPT | Mod: GP | Performed by: PHYSICAL THERAPIST

## 2017-10-26 PROCEDURE — 97112 NEUROMUSCULAR REEDUCATION: CPT | Mod: GP | Performed by: PHYSICAL THERAPIST

## 2017-11-06 DIAGNOSIS — B35.1 ONYCHOMYCOSIS DUE TO DERMATOPHYTE: Primary | ICD-10-CM

## 2017-11-06 RX ORDER — ITRACONAZOLE 100 MG/1
100 CAPSULE ORAL DAILY
Qty: 30 CAPSULE | Refills: 3 | Status: SHIPPED | OUTPATIENT
Start: 2017-11-06 | End: 2017-11-14

## 2017-11-07 ENCOUNTER — THERAPY VISIT (OUTPATIENT)
Dept: PHYSICAL THERAPY | Facility: CLINIC | Age: 82
End: 2017-11-07
Payer: MEDICARE

## 2017-11-07 ENCOUNTER — TELEPHONE (OUTPATIENT)
Dept: FAMILY MEDICINE | Facility: CLINIC | Age: 82
End: 2017-11-07

## 2017-11-07 DIAGNOSIS — B35.1 ONYCHOMYCOSIS: Primary | ICD-10-CM

## 2017-11-07 DIAGNOSIS — Z96.611 S/P REVERSE TOTAL SHOULDER ARTHROPLASTY, RIGHT: ICD-10-CM

## 2017-11-07 DIAGNOSIS — Z51.89 AFTER CARE: ICD-10-CM

## 2017-11-07 PROCEDURE — 97140 MANUAL THERAPY 1/> REGIONS: CPT | Mod: GP | Performed by: PHYSICAL THERAPIST

## 2017-11-07 PROCEDURE — 97110 THERAPEUTIC EXERCISES: CPT | Mod: GP | Performed by: PHYSICAL THERAPIST

## 2017-11-07 PROCEDURE — 97112 NEUROMUSCULAR REEDUCATION: CPT | Mod: GP | Performed by: PHYSICAL THERAPIST

## 2017-11-07 NOTE — TELEPHONE ENCOUNTER
PA is needed for the medication, Itraconazole 100 mg.     Insurance has been called.  Alternatives that are covered are: N/A    Would you like to start PA or Rx alternative medication?    If PA, please list all medications this patient has tried along with any contraindications that may have been experienced in the past. Thank you.    ROUTE BACK TO JANICE HICKS    Pharmacy: [facility]   Phone: [phone]    Insurance Plan: [employer]  Phone: [phone]  Pt ID: [id number]  Group:     Forwarded to Dr. Pena for review.  Jazzy Hicks MA

## 2017-11-14 RX ORDER — TERBINAFINE HYDROCHLORIDE 250 MG/1
250 TABLET ORAL DAILY
Qty: 90 TABLET | Refills: 0 | Status: SHIPPED | OUTPATIENT
Start: 2017-11-14 | End: 2018-03-03

## 2017-11-14 NOTE — TELEPHONE ENCOUNTER
Called insurance; PA Denied.    PA was denied due to the following question; Does the patient have Onychomycosis due to tinea that has been confirmed by a fungal diagnostic test? Answer No.     We need to complete the fungal diagnostic test before they will cover the medication.    They said you could Appeal it for a redetermination with out the test; if you do that you will need to write a letter explaining in detail why the patient needs this medication with out a fungal diagnostic test.    ROUTE BACK TO JANICE HICKS.    Routing to PCP.    Jazzy Hicks MA

## 2017-11-16 NOTE — TELEPHONE ENCOUNTER
Reason for Call:  Other     Detailed comments: [employer] is calling from [employer] and is needing to talk to someone in regards to this prior authorization.     Phone Number Patient can be reached at: Other phone number:  [facility]/ [employer]/ [phone]    Best Time: any    Can we leave a detailed message on this number? YES    Call taken on 11/16/2017 at 10:33 AM by Brittney Miner

## 2017-11-20 NOTE — TELEPHONE ENCOUNTER
Medication was changed to lamisil.     Pt has picked up medication and is using it.    Called [facility] [employer] and was on hold for more then 20 minutes.     Unsure why they are calling.    Jazzy Weems MA

## 2017-11-21 ENCOUNTER — THERAPY VISIT (OUTPATIENT)
Dept: PHYSICAL THERAPY | Facility: CLINIC | Age: 82
End: 2017-11-21
Payer: MEDICARE

## 2017-11-21 DIAGNOSIS — Z51.89 AFTER CARE: ICD-10-CM

## 2017-11-21 DIAGNOSIS — Z96.611 S/P REVERSE TOTAL SHOULDER ARTHROPLASTY, RIGHT: ICD-10-CM

## 2017-11-21 PROCEDURE — 97112 NEUROMUSCULAR REEDUCATION: CPT | Mod: GP | Performed by: PHYSICAL THERAPIST

## 2017-11-21 PROCEDURE — 97140 MANUAL THERAPY 1/> REGIONS: CPT | Mod: GP | Performed by: PHYSICAL THERAPIST

## 2017-11-21 PROCEDURE — 97110 THERAPEUTIC EXERCISES: CPT | Mod: GP | Performed by: PHYSICAL THERAPIST

## 2017-12-20 ENCOUNTER — THERAPY VISIT (OUTPATIENT)
Dept: PHYSICAL THERAPY | Facility: CLINIC | Age: 82
End: 2017-12-20
Payer: MEDICARE

## 2017-12-20 DIAGNOSIS — Z96.611 S/P REVERSE TOTAL SHOULDER ARTHROPLASTY, RIGHT: ICD-10-CM

## 2017-12-20 DIAGNOSIS — Z51.89 AFTER CARE: ICD-10-CM

## 2017-12-20 PROCEDURE — G8985 CARRY GOAL STATUS: HCPCS | Mod: GP | Performed by: PHYSICAL THERAPIST

## 2017-12-20 PROCEDURE — G8986 CARRY D/C STATUS: HCPCS | Mod: GP | Performed by: PHYSICAL THERAPIST

## 2017-12-20 PROCEDURE — 97140 MANUAL THERAPY 1/> REGIONS: CPT | Mod: GP | Performed by: PHYSICAL THERAPIST

## 2017-12-20 PROCEDURE — 97110 THERAPEUTIC EXERCISES: CPT | Mod: GP | Performed by: PHYSICAL THERAPIST

## 2017-12-20 NOTE — LETTER
[facility]THLETIC [facility] PHYSICAL THERAPY  [address]  [place] [address]  [phone]    2017    Re: Pam Hartman   :   1935  MRN:  [id number]   REFERRING PHYSICIAN:   Mike Agudelo    [facility]THLETIC [facility] PHYSICAL [facility]    Date of Initial Evaluation:  2017  Visits:  Rxs Used: 20  Reason for Referral:     S/P reverse total shoulder arthroplasty, right  After care    DISCHARGE REPORT  Progress reporting period is from 17 to 17.       SUBJECTIVE  Subjective: Overall, patient feeling her right shoulder is 90% improved. Has fallen off of performing her HEP in the past month due to dealing with her husbands illness and having to take on increased duties/activities at home. Able to perform all ADL's as needed, even able to reach back to tie her apron, just off to the right side instead of th center. Remains unable to sleep with her right UE under the pillow as it hurts too much.    Current pain level is 0/10.     Initial Pain level: 4/10.   Changes in function:  Yes (See Goal flowsheet attached for changes in current functional level)  Adverse reaction to treatment or activity: None    OBJECTIVE  Changes noted in objective findings:  Yes, overall, improved AROM of right shoulder: flexion 135, abduction 132, ER 65, IR/ext central L5. PROM slightly > AROM. Mid range MMT of right shoulder: flexion and abduction 4+/5, IR 4+/5, ER 3-/5. Tightness and tenderness along right UT muscle. No tenderness remains along anterior right shoulder scar. Patient is able to reproduce HEP. Improved SPADI score from 71% initially to 22% currently.     ASSESSMENT/PLAN  Updated problem list and treatment plan: Diagnosis 1:  S/p right shoulder reverse TSA  Decreased ROM/flexibility - home program  Decreased strength - home program  Decreased function - home program  STG/LTGs have been met or progress has been made  towards goals:  Yes (See Goal flow sheet completed today.)  Assessment of Progress: The patient's progress has plateaued.  Self Management Plans:  Patient is independent in a home treatment program.  Patient is independent in self management of symptoms.  Re: Pam Hartman   :   1935    I have re-evaluated this patient and find that the nature, scope, duration and intensity of the therapy is appropriate for the medical condition of the patient.  Pam continues to require the following intervention to meet STG and LTG's:  PT intervention is no longer required to meet STG/LTG.    Recommendations:  This patient is ready to be discharged from therapy and continue their home treatment program.    Thank you for your referral.    INQUIRIES  Therapist: Araceli Cantu PT  [facility]  [address] [address]  Phone: [phone]  Fax: [phone]

## 2017-12-20 NOTE — MR AVS SNAPSHOT
After Visit Summary   12/20/2017    Pam Hartman    MRN: [id number]           Patient Information     Date Of Birth          1935        Visit Information        Provider Department      12/20/2017 9:40 AM Araceli Cho PT [facility]        Today's Diagnoses     S/P reverse total shoulder arthroplasty, right        After care           Follow-ups after your visit        Who to contact     If you have questions or need follow up information about today's clinic visit or your schedule please contact [facility]THLETIC [facility] PHYSICAL [facility] directly at [phone].  Normal or non-critical lab and imaging results will be communicated to you by aSmallWorldhart, letter or phone within 4 business days after the clinic has received the results. If you do not hear from us within 7 days, please contact the clinic through aSmallWorldhart or phone. If you have a critical or abnormal lab result, we will notify you by phone as soon as possible.  Submit refill requests through RealDeck or call your pharmacy and they will forward the refill request to us. Please allow 3 business days for your refill to be completed.          Additional Information About Your Visit        MyChart Information     RealDeck gives you secure access to your electronic health record. If you see a primary care provider, you can also send messages to your care team and make appointments. If you have questions, please call your primary care clinic.  If you do not have a primary care provider, please call [phone] and they will assist you.        Care EveryWhere ID     This is your Care EveryWhere ID. This could be used by other organizations to access your [place] medical records  [id number]         Blood Pressure from Last 3 Encounters:   07/10/17 148/70   12/05/16 138/64   09/23/16 154/68    Weight from Last 3 Encounters:   10/16/17 66.7 kg (147 lb)    08/29/17 65.8 kg (145 lb)   08/01/17 66.7 kg (147 lb)              We Performed the Following     [facility] PROGRESS NOTES REPORT     MANUAL THER TECH,1+REGIONS,EA 15 MIN     THERAPEUTIC EXERCISES          Today's Medication Changes          These changes are accurate as of: 12/20/17 11:45 PM.  If you have any questions, ask your nurse or doctor.               These medicines have changed or have updated prescriptions.        Dose/Directions    furosemide 20 MG tablet   Commonly known as:  LASIX   This may have changed:  how much to take   Used for:  Generalized edema        Dose:  40 mg   Take 2 tablets (40 mg) by mouth daily   Quantity:  180 tablet   Refills:  3                Primary Care Provider Office Phone # Fax #    Satish Pena [phone]       [address] [facility] [address]        Equal Access to Services     ANTHONY OSUNA : Josh Wood, wajess luqadaha, qaybta kaalmamatt rangel, bev chandler . So [facility] [phone].    ATENCIÓN: Si habla español, tiene a armstrong disposición servicios gratuitos de asistencia lingüística. Llame al [phone].    We comply with applicable federal civil rights laws and [place] laws. We do not discriminate on the basis of race, color, national origin, age, disability, sex, sexual orientation, or gender identity.            Thank you!     Thank you for choosing [facility] PH[facility]ICAL THERAP[facility]  for your care. Our goal is always to provide you with excellent care. Hearing back from our patients is one way we can continue to improve our services. Please take a few minutes to complete the written survey that you may receive in the mail after your visit with us. Thank you!             Your Updated Medication List - Protect others around you: Learn how to safely use, store and throw away your medicines at www.disposemymeds.org.          This list is accurate as of: 12/20/17  11:45 PM.  Always use your most recent med list.                   Brand Name Dispense Instructions for use Diagnosis    amLODIPine 5 MG tablet    NORVASC    90 tablet    TAKE 1 TABLET (5 MG) BY MOUTH DAILY    Hypertension goal BP (blood pressure) < 140/90       aspirin 81 MG tablet      Take 1 tablet by mouth daily.        blood glucose monitoring meter device kit    no brand specified    1 kit    Use to test blood sugar 2 times daily or as directed.    Glucose intolerance (impaired glucose tolerance), Angioedema, initial encounter       blood glucose monitoring test strip    no brand specified    100 each    Use to test blood sugars 2 times daily or as directed    Angioedema, initial encounter, Glucose intolerance (impaired glucose tolerance)       calcipotriene 0.005 % Soln solution    DOVONEX    1 Bottle    Apply  topically. APPLY bid AS NEEDED    Psoriasis       calcium carbonate-vitamin D 500-400 MG-UNIT Tabs per tablet     180 tablet    ONE BY MOUTH TWICE A DAY    Osteopenia, Type 2 diabetes mellitus without complication (H), Hyperlipidemia LDL goal <100, Special screening for malignant neoplasms, colon, Need for vaccination for Strep pneumoniae       furosemide 20 MG tablet    LASIX    180 tablet    Take 2 tablets (40 mg) by mouth daily    Generalized edema       losartan 100 MG tablet    COZAAR    90 tablet    Take 1 tablet (100 mg) by mouth daily    Essential hypertension with goal blood pressure less than 140/90       metFORMIN 500 MG tablet    GLUCOPHAGE    90 tablet    Take 1 tablet (500 mg) by mouth daily (with breakfast)    Type 2 diabetes mellitus without complication, without long-term current use of insulin (H)       PROBIOTIC PO           simvastatin 20 MG tablet    ZOCOR    90 tablet    Take 0.5 tablets (10 mg) by mouth At Bedtime    Type 2 diabetes mellitus without complication, without long-term current use of insulin (H), Hyperlipidemia LDL goal <100       terbinafine 250 MG tablet    lamISIL     90 tablet    Take 1 tablet (250 mg) by mouth daily    Onychomycosis       TYLENOL PO      Take 2,000 mg by mouth daily    Acute maxillary sinusitis, Right shoulder injury, initial encounter

## 2017-12-29 DIAGNOSIS — R60.1 GENERALIZED EDEMA: ICD-10-CM

## 2017-12-29 RX ORDER — FUROSEMIDE 20 MG
TABLET ORAL
Qty: 180 TABLET | Refills: 2 | Status: SHIPPED | OUTPATIENT
Start: 2017-12-29 | End: 2018-04-25

## 2018-03-03 DIAGNOSIS — B35.1 ONYCHOMYCOSIS: ICD-10-CM

## 2018-03-05 RX ORDER — TERBINAFINE HYDROCHLORIDE 250 MG/1
TABLET ORAL
Qty: 90 TABLET | Refills: 0 | Status: SHIPPED | OUTPATIENT
Start: 2018-03-05 | End: 2018-04-25

## 2018-03-05 NOTE — TELEPHONE ENCOUNTER
Requested Prescriptions   Pending Prescriptions Disp Refills     terbinafine (LAMISIL) 250 MG tablet [Pharmacy Med Name: TERBINAFINE  MG TABLET] 90 tablet 0     Sig: TAKE 1 TABLET (250 MG) BY MOUTH DAILY    There is no refill protocol information for this order         Last Written Prescription Date:  11/14/17  Last Fill Quantity: 90,   # refills: 0  Last Office Visit: 7/10/17  Future Office visit:       Routing refill request to provider for review/approval because:  Drug not on the [facility], P or [facility] refill protocol or controlled substance

## 2018-04-13 ENCOUNTER — TELEPHONE (OUTPATIENT)
Dept: FAMILY MEDICINE | Facility: CLINIC | Age: 83
End: 2018-04-13

## 2018-04-13 DIAGNOSIS — I10 HTN, GOAL BELOW 150/90: Primary | ICD-10-CM

## 2018-04-13 DIAGNOSIS — L40.9 PSORIASIS: ICD-10-CM

## 2018-04-13 DIAGNOSIS — E78.5 HYPERLIPIDEMIA LDL GOAL <100: ICD-10-CM

## 2018-04-13 DIAGNOSIS — E11.9 TYPE 2 DIABETES MELLITUS WITHOUT COMPLICATION, WITHOUT LONG-TERM CURRENT USE OF INSULIN (H): ICD-10-CM

## 2018-04-13 RX ORDER — SIMVASTATIN 20 MG
TABLET ORAL
Qty: 15 TABLET | Refills: 0 | Status: SHIPPED | OUTPATIENT
Start: 2018-04-13 | End: 2018-04-25

## 2018-04-13 NOTE — TELEPHONE ENCOUNTER
Patient needs fasting labs and office visit.    Medication is being filled for 1 time refill only due to:  Patient needs to be seen because due for fasting labs and visit.     Encounter routed to team to reach out to patient to schedule.    Geno Barajas RN  [facility]

## 2018-04-13 NOTE — TELEPHONE ENCOUNTER
"Requested Prescriptions   Pending Prescriptions Disp Refills     simvastatin (ZOCOR) 20 MG tablet [Pharmacy Med Name: SIMVASTATIN 20 MG TABLET] 90 tablet 2    Last Written Prescription Date:  12/5/16  Last Fill Quantity: 90,  # refills: 4   Last office visit: 7/10/2017 with prescribing provider:     Future Office Visit:     Sig: TAKE 1/2 TABLET BY MOUTH NIGHTLY AT BEDTIME    Statins Protocol Failed    4/13/2018  1:28 PM       Failed - LDL on file in past 12 months    Recent Labs   Lab Test  07/11/16   0935   LDL  69            Passed - No abnormal creatine kinase in past 12 months    Recent Labs   Lab Test  01/14/15   1259   CKT  84               Passed - Recent (12 mo) or future (30 days) visit within the authorizing provider's specialty    Patient had office visit in the last 12 months or has a visit in the next 30 days with authorizing provider or within the authorizing provider's specialty.  See \"Patient Info\" tab in inbasket, or \"Choose Columns\" in Meds & Orders section of the refill encounter.           Passed - Patient is age 18 or older       Passed - No active pregnancy on record       Passed - No positive pregnancy test in past 12 months          "

## 2018-04-16 NOTE — TELEPHONE ENCOUNTER
Spoke with patient and informed, appointments made with Lab and PCP.    Patient is scheduled with Lab on Wed, 4-18-18.  Routing message to provider to order any labs needed.

## 2018-04-18 DIAGNOSIS — E11.9 TYPE 2 DIABETES MELLITUS WITHOUT COMPLICATION, WITHOUT LONG-TERM CURRENT USE OF INSULIN (H): ICD-10-CM

## 2018-04-18 DIAGNOSIS — I10 HTN, GOAL BELOW 150/90: ICD-10-CM

## 2018-04-18 DIAGNOSIS — E78.5 HYPERLIPIDEMIA LDL GOAL <100: ICD-10-CM

## 2018-04-18 LAB
ALBUMIN SERPL-MCNC: 3.8 G/DL (ref 3.4–5)
ALP SERPL-CCNC: 94 U/L (ref 40–150)
ALT SERPL W P-5'-P-CCNC: 23 U/L (ref 0–50)
ANION GAP SERPL CALCULATED.3IONS-SCNC: 10 MMOL/L (ref 3–14)
AST SERPL W P-5'-P-CCNC: 15 U/L (ref 0–45)
BILIRUB DIRECT SERPL-MCNC: 0.1 MG/DL (ref 0–0.2)
BILIRUB SERPL-MCNC: 0.6 MG/DL (ref 0.2–1.3)
BUN SERPL-MCNC: 22 MG/DL (ref 7–30)
CALCIUM SERPL-MCNC: 9.6 MG/DL (ref 8.5–10.1)
CHLORIDE SERPL-SCNC: 101 MMOL/L (ref 94–109)
CHOLEST SERPL-MCNC: 148 MG/DL
CO2 SERPL-SCNC: 28 MMOL/L (ref 20–32)
CREAT SERPL-MCNC: 0.98 MG/DL (ref 0.52–1.04)
CREAT UR-MCNC: 50 MG/DL
GFR SERPL CREATININE-BSD FRML MDRD: 54 ML/MIN/1.7M2
GLUCOSE SERPL-MCNC: 146 MG/DL (ref 70–99)
HBA1C MFR BLD: 6.9 % (ref 0–5.6)
HDLC SERPL-MCNC: 47 MG/DL
LDLC SERPL CALC-MCNC: 66 MG/DL
MICROALBUMIN UR-MCNC: 16 MG/L
MICROALBUMIN/CREAT UR: 32.67 MG/G CR (ref 0–25)
NONHDLC SERPL-MCNC: 101 MG/DL
POTASSIUM SERPL-SCNC: 4 MMOL/L (ref 3.4–5.3)
PROT SERPL-MCNC: 7.7 G/DL (ref 6.8–8.8)
SODIUM SERPL-SCNC: 139 MMOL/L (ref 133–144)
TRIGL SERPL-MCNC: 176 MG/DL

## 2018-04-18 PROCEDURE — 80061 LIPID PANEL: CPT | Performed by: INTERNAL MEDICINE

## 2018-04-18 PROCEDURE — 80048 BASIC METABOLIC PNL TOTAL CA: CPT | Performed by: INTERNAL MEDICINE

## 2018-04-18 PROCEDURE — 82043 UR ALBUMIN QUANTITATIVE: CPT | Performed by: INTERNAL MEDICINE

## 2018-04-18 PROCEDURE — 83036 HEMOGLOBIN GLYCOSYLATED A1C: CPT | Performed by: INTERNAL MEDICINE

## 2018-04-18 PROCEDURE — 36415 COLL VENOUS BLD VENIPUNCTURE: CPT | Performed by: INTERNAL MEDICINE

## 2018-04-18 PROCEDURE — 80076 HEPATIC FUNCTION PANEL: CPT | Performed by: INTERNAL MEDICINE

## 2018-04-25 ENCOUNTER — OFFICE VISIT (OUTPATIENT)
Dept: FAMILY MEDICINE | Facility: CLINIC | Age: 83
End: 2018-04-25
Payer: MEDICARE

## 2018-04-25 VITALS
SYSTOLIC BLOOD PRESSURE: 179 MMHG | BODY MASS INDEX: 28.71 KG/M2 | WEIGHT: 147 LBS | DIASTOLIC BLOOD PRESSURE: 80 MMHG | HEART RATE: 74 BPM | TEMPERATURE: 98.4 F | OXYGEN SATURATION: 99 %

## 2018-04-25 DIAGNOSIS — I10 ESSENTIAL HYPERTENSION WITH GOAL BLOOD PRESSURE LESS THAN 140/90: ICD-10-CM

## 2018-04-25 DIAGNOSIS — G89.29 CHRONIC RIGHT SHOULDER PAIN: Primary | ICD-10-CM

## 2018-04-25 DIAGNOSIS — E78.5 HYPERLIPIDEMIA LDL GOAL <100: ICD-10-CM

## 2018-04-25 DIAGNOSIS — E11.9 TYPE 2 DIABETES MELLITUS WITHOUT COMPLICATION, WITHOUT LONG-TERM CURRENT USE OF INSULIN (H): ICD-10-CM

## 2018-04-25 DIAGNOSIS — R60.1 GENERALIZED EDEMA: ICD-10-CM

## 2018-04-25 DIAGNOSIS — M25.511 CHRONIC RIGHT SHOULDER PAIN: Primary | ICD-10-CM

## 2018-04-25 PROCEDURE — 99207 C FOOT EXAM  NO CHARGE: CPT | Mod: 25 | Performed by: INTERNAL MEDICINE

## 2018-04-25 PROCEDURE — 99214 OFFICE O/P EST MOD 30 MIN: CPT | Performed by: INTERNAL MEDICINE

## 2018-04-25 RX ORDER — AMLODIPINE BESYLATE 10 MG/1
10 TABLET ORAL DAILY
Qty: 90 TABLET | Refills: 3 | Status: SHIPPED | OUTPATIENT
Start: 2018-04-25 | End: 2019-04-08

## 2018-04-25 RX ORDER — SIMVASTATIN 20 MG
TABLET ORAL
Qty: 45 TABLET | Refills: 3 | Status: SHIPPED | OUTPATIENT
Start: 2018-04-25 | End: 2019-04-29

## 2018-04-25 RX ORDER — FUROSEMIDE 20 MG
TABLET ORAL
Qty: 180 TABLET | Refills: 3 | Status: SHIPPED | OUTPATIENT
Start: 2018-04-25 | End: 2019-04-29

## 2018-04-25 RX ORDER — LOSARTAN POTASSIUM 100 MG/1
100 TABLET ORAL DAILY
Qty: 90 TABLET | Refills: 3 | Status: SHIPPED | OUTPATIENT
Start: 2018-04-25 | End: 2019-04-29

## 2018-04-25 ASSESSMENT — PAIN SCALES - GENERAL: PAINLEVEL: NO PAIN (0)

## 2018-04-25 NOTE — NURSING NOTE
Chief Complaint   Patient presents with     Diabetes     Hyperlipidemia       Initial /80 (BP Location: Right arm, Patient Position: Chair, Cuff Size: Adult Regular)  Pulse 74  Temp 98.4  F (36.9  C) (Oral)  Wt 147 lb (66.7 kg)  SpO2 99%  Breastfeeding? No  BMI 28.71 kg/m2 Estimated body mass index is 28.71 kg/(m^2) as calculated from the following:    Height as of 10/16/17: 5' (1.524 m).    Weight as of this encounter: 147 lb (66.7 kg).  Medication Reconciliation: complete   Jazzy Weems MA

## 2018-04-25 NOTE — PROGRESS NOTES
SUBJECTIVE:   Pam Hartman is a 83 year old female who presents to clinic today for the following health issues:    Diabetes Follow-up      Patient is checking blood sugars: rarely.  Results range from 140 to 175's    Diabetic concerns: None     Symptoms of hypoglycemia (low blood sugar): none     Paresthesias (numbness or burning in feet) or sores: No     Date of last diabetic eye exam: DUE    BP Readings from Last 2 Encounters:   07/10/17 148/70   12/05/16 138/64     Hemoglobin A1C (%)   Date Value   04/18/2018 6.9 (H)   11/28/2016 7.6 (H)     LDL Cholesterol Calculated (mg/dL)   Date Value   04/18/2018 66   07/11/2016 69       Amount of exercise or physical activity: 2-3 days/week for an average of 15-30 minutes    Problems taking medications regularly: No    Medication side effects: none    Diet: regular (no restrictions), low salt and low fat/cholesterol    Not checking.           Problem list and histories reviewed & adjusted, as indicated.  Additional history: as documented    Patient Active Problem List   Diagnosis     HYPERLIPIDEMIA LDL GOAL <100     Psoriasis     IBS (irritable bowel syndrome)     Health Care Home     ASCUS on Pap smear     Advanced directives, counseling/discussion     Type 2 diabetes, HbA1C goal < 8% (H)     Rotator cuff tear arthropathy     Chronic maxillary sinusitis     HTN, goal below 150/90     Type 2 diabetes mellitus without complication (H)     Past Surgical History:   Procedure Laterality Date     EYE SURGERY  7/2015    Left and right cataract repair     REVERSE ARTHROPLASTY SHOULDER Right 07/19/2017    Nahun Skinner MD at [facility]     SEPTOPLASTY  1/16/2014    Procedure: SEPTOPLASTY;  Septoplasty, Left ethmoidectomy, Left endoscopic maxillary antrostomy, right kike bullosa;  Surgeon: Sarah Garcia MD;  Location:  OR     TONSILLECTOMY & ADENOIDECTOMY         Social History   Substance Use Topics     Smoking status: Former Smoker     Quit date:  11/30/1984     Smokeless tobacco: Never Used     Alcohol use Yes      Comment: 2 DRINKS PER MONTH     Family History   Problem Relation Age of Onset     CEREBROVASCULAR DISEASE Father          Current Outpatient Prescriptions   Medication Sig Dispense Refill     Acetaminophen (TYLENOL PO) Take 2,000 mg by mouth daily        amLODIPine (NORVASC) 10 MG tablet Take 1 tablet (10 mg) by mouth daily 90 tablet 3     amLODIPine (NORVASC) 5 MG tablet TAKE 1 TABLET (5 MG) BY MOUTH DAILY 90 tablet 2     aspirin 81 MG tablet Take 1 tablet by mouth daily.       blood glucose monitoring (NO BRAND SPECIFIED) meter device kit Use to test blood sugar 2 times daily or as directed. 1 kit 0     blood glucose monitoring (NO BRAND SPECIFIED) test strip Use to test blood sugars 2 times daily or as directed 100 each 12     calcipotriene (DOVONEX) 0.005 % SOLN Apply  topically. APPLY bid AS NEEDED 1 Bottle 4     calcium carbonate-vitamin D 500-400 MG-UNIT TABS tablt ONE BY MOUTH TWICE A  tablet 3     furosemide (LASIX) 20 MG tablet TAKE 2 TABLETS BY MOUTH ONCE DAILY 180 tablet 3     losartan (COZAAR) 100 MG tablet Take 1 tablet (100 mg) by mouth daily 90 tablet 3     metFORMIN (GLUCOPHAGE) 500 MG tablet Take 1 tablet (500 mg) by mouth daily (with breakfast) 90 tablet 3     Probiotic Product (PROBIOTIC PO)        simvastatin (ZOCOR) 20 MG tablet TAKE 1/2 TABLET BY MOUTH NIGHTLY AT BEDTIME 45 tablet 3     Allergies   Allergen Reactions     Septra [Bactrim]      Sulfa Drugs Rash     Recent Labs   Lab Test  04/18/18   1000  07/10/17   1023  11/28/16   0913  07/11/16   0935   07/24/15   0705   A1C  6.9*   --   7.6*  7.6*   < >  8.0*   LDL  66   --    --   69   --   79   HDL  47*   --    --   46*   --   44*   TRIG  176*   --    --   175*   --   147   ALT  23   --    --   22   --   26   CR  0.98  0.91  1.12*  0.98   < >  0.84   GFRESTIMATED  54*  59*  47*  54*   < >  65   GFRESTBLACK  66  72  57*  66   < >  79   POTASSIUM  4.0  4.6  3.8   3.9   < >  3.4   TSH   --    --    --   1.41   --   1.76    < > = values in this interval not displayed.        Reviewed and updated as needed this visit by clinical staff       Reviewed and updated as needed this visit by Provider         ROS:  Constitutional, HEENT, cardiovascular, pulmonary, gi and gu systems are negative, except as otherwise noted.    OBJECTIVE:     /80 (BP Location: Right arm, Patient Position: Chair, Cuff Size: Adult Regular)  Pulse 74  Temp 98.4  F (36.9  C) (Oral)  Wt 147 lb (66.7 kg)  SpO2 99%  Breastfeeding? No  BMI 28.71 kg/m2  Body mass index is 28.71 kg/(m^2).  GENERAL: healthy, alert and no distress  EYES: Eyes grossly normal to inspection, PERRL and conjunctivae and sclerae normal  HENT: ear canals and TM's normal, nose and mouth without ulcers or lesions  NECK: no adenopathy, no asymmetry, masses, or scars and thyroid normal to palpation  RESP: lungs clear to auscultation - no rales, rhonchi or wheezes  CV: regular rate and rhythm, normal S1 S2, no S3 or S4, no murmur, click or rub, no peripheral edema and peripheral pulses strong  ABDOMEN: soft, nontender, no hepatosplenomegaly, no masses and bowel sounds normal  MS: no gross musculoskeletal defects noted, no edema  SKIN: no suspicious lesions or rashes  NEURO: Normal strength and tone, mentation intact and speech normal  BACK: no CVA tenderness, no paralumbar tenderness  PSYCH: mentation appears normal, affect normal/bright  LYMPH: no cervical, supraclavicular, axillary, or inguinal adenopathy    Diagnostic Test Results:  Results for orders placed or performed in visit on 04/18/18   **Basic metabolic panel FUTURE anytime   Result Value Ref Range    Sodium 139 133 - 144 mmol/L    Potassium 4.0 3.4 - 5.3 mmol/L    Chloride 101 94 - 109 mmol/L    Carbon Dioxide 28 20 - 32 mmol/L    Anion Gap 10 3 - 14 mmol/L    Glucose 146 (H) 70 - 99 mg/dL    Urea Nitrogen 22 7 - 30 mg/dL    Creatinine 0.98 0.52 - 1.04 mg/dL    GFR  Estimate 54 (L) >60 mL/min/1.7m2    GFR Estimate If Black 66 >60 mL/min/1.7m2    Calcium 9.6 8.5 - 10.1 mg/dL   Lipid panel reflex to direct LDL Fasting   Result Value Ref Range    Cholesterol 148 <200 mg/dL    Triglycerides 176 (H) <150 mg/dL    HDL Cholesterol 47 (L) >49 mg/dL    LDL Cholesterol Calculated 66 <100 mg/dL    Non HDL Cholesterol 101 <130 mg/dL   Hepatic panel (Albumin, ALT, AST, Bili, Alk Phos, TP)   Result Value Ref Range    Bilirubin Direct 0.1 0.0 - 0.2 mg/dL    Bilirubin Total 0.6 0.2 - 1.3 mg/dL    Albumin 3.8 3.4 - 5.0 g/dL    Protein Total 7.7 6.8 - 8.8 g/dL    Alkaline Phosphatase 94 40 - 150 U/L    ALT 23 0 - 50 U/L    AST 15 0 - 45 U/L   **A1C FUTURE anytime   Result Value Ref Range    Hemoglobin A1C 6.9 (H) 0 - 5.6 %   Albumin Random Urine Quantitative with Creat Ratio   Result Value Ref Range    Creatinine Urine 50 mg/dL    Albumin Urine mg/L 16 mg/L    Albumin Urine mg/g Cr 32.67 (H) 0 - 25 mg/g Cr       ASSESSMENT/PLAN:       ICD-10-CM    1. Chronic right shoulder pain M25.511 [facility] REF[facility]    G89.29    2. Type 2 diabetes mellitus without complication, without long-term current use of insulin (H) E11.9 FOOT EXAM     OPTOMETRY REFERRAL     simvastatin (ZOCOR) 20 MG tablet     metFORMIN (GLUCOPHAGE) 500 MG tablet   3. Hyperlipidemia LDL goal <100 E78.5 simvastatin (ZOCOR) 20 MG tablet     amLODIPine (NORVASC) 10 MG tablet   4. Essential hypertension with goal blood pressure less than 140/90 I10 losartan (COZAAR) 100 MG tablet   5. Generalized edema R60.1 furosemide (LASIX) 20 MG tablet     BP     179/80  4/26/2018    Lab Results   Component Value Date     04/18/2018     Lab Results   Component Value Date    A1C 6.9 04/18/2018     Lab Results   Component Value Date    LDL 66 04/18/2018     Lab Results   Component Value Date    MICROL 16 04/18/2018     No results found for: MICROALBUMIN        Satish Pena MD  [facility]

## 2018-04-25 NOTE — MR AVS SNAPSHOT
After Visit Summary   4/25/2018    Pam Hartman    MRN: [id number]           Patient Information     Date Of Birth          1935        Visit Information        Provider Department      4/25/2018 10:00 AM Satish Pena MD [facility]        Today's Diagnoses     Chronic right shoulder pain    -  1    Type 2 diabetes mellitus without complication, without long-term current use of insulin (H)        Hyperlipidemia LDL goal <100        Essential hypertension with goal blood pressure less than 140/90        Generalized edema           Follow-ups after your visit        Additional Services     [facility] REFERRAL       **This order will print in the [facility] Scheduling Office**    Physical Therapy, Hand Therapy and Chiropractic Care are available through:    *[facility]  *[facility]  *[facility]    Call one number to schedule at any of the above locations: [phone].    Your provider has referred you to: Physical Therapy at [facility] or [facility] [place]    Indication/Reason for Referral: right shoulder pain  Onset of Illness: 3 months  Therapy Orders: Evaluate and Treat  Special Programs: None  Special Request: None    Lisha Husain      Additional Comments for the Therapist or Chiropractor:     Please be aware that coverage of these services is subject to the terms and limitations of your health insurance plan.  Call member services at your health plan with any benefit or coverage questions.      Please bring the following to your appointment:    *Your personal calendar for scheduling future appointments  *Comfortable clothing            OPTOMETRY REFERRAL       Your provider has referred you to: [facility]: [facility] [place] [phone]    [email or web address]    Please be aware that coverage of these services is subject to the terms and limitations of your health insurance plan.   Call member services at your health plan with any benefit or coverage questions.      Please bring the following with you to your appointment:    (1) Any X-Rays, CTs or MRIs which have been performed.  Contact the facility where they were done to arrange for  prior to your scheduled appointment.    (2) List of current medications  (3) This referral request   (4) Any documents/labs given to you for this referral                  Who to contact     If you have questions or need follow up information about today's clinic visit or your schedule please contact [facility] directly at [phone].  Normal or non-critical lab and imaging results will be communicated to you by [employer], letter or phone within 4 business days after the clinic has received the results. If you do not hear from us within 7 days, please contact the clinic through [employer] or phone. If you have a critical or abnormal lab result, we will notify you by phone as soon as possible.  Submit refill requests through [employer] or call your pharmacy and they will forward the refill request to us. Please allow 3 business days for your refill to be completed.          Additional Information About Your Visit        MyChart Information     [employer] gives you secure access to your electronic health record. If you see a primary care provider, you can also send messages to your care team and make appointments. If you have questions, please call your primary care clinic.  If you do not have a primary care provider, please call [phone] and they will assist you.        Care EveryWhere ID     This is your Care EveryWhere ID. This could be used by other organizations to access your [facility] medical records  [id number]        Your Vitals Were     Pulse Temperature Pulse Oximetry Breastfeeding? BMI (Body Mass Index)       74 98.4  F (36.9  C) (Oral) 99% No 28.71 kg/m2        Blood Pressure from Last 3 Encounters:   04/25/18 179/80    07/10/17 148/70   12/05/16 138/64    Weight from Last 3 Encounters:   04/25/18 147 lb (66.7 kg)   10/16/17 147 lb (66.7 kg)   08/29/17 145 lb (65.8 kg)              We Performed the Following     FOOT EXAM     [facility] REFERRAL     OPTOMETRY REFERRAL          Today's Medication Changes          These changes are accurate as of 4/25/18 10:39 AM.  If you have any questions, ask your nurse or doctor.               These medicines have changed or have updated prescriptions.        Dose/Directions    * amLODIPine 5 MG tablet   Commonly known as:  NORVASC   This may have changed:  Another medication with the same name was added. Make sure you understand how and when to take each.   Used for:  Hypertension goal BP (blood pressure) < 140/90   Changed by:  Satish Pena MD        TAKE 1 TABLET (5 MG) BY MOUTH DAILY   Quantity:  90 tablet   Refills:  2       * amLODIPine 10 MG tablet   Commonly known as:  NORVASC   This may have changed:  You were already taking a medication with the same name, and this prescription was added. Make sure you understand how and when to take each.   Used for:  Hyperlipidemia LDL goal <100   Changed by:  Satish Pena MD        Dose:  10 mg   Take 1 tablet (10 mg) by mouth daily   Quantity:  90 tablet   Refills:  3       furosemide 20 MG tablet   Commonly known as:  LASIX   This may have changed:  See the new instructions.   Used for:  Generalized edema   Changed by:  Satish Pena MD        TAKE 2 TABLETS BY MOUTH ONCE DAILY   Quantity:  180 tablet   Refills:  3       simvastatin 20 MG tablet   Commonly known as:  ZOCOR   This may have changed:  See the new instructions.   Used for:  Type 2 diabetes mellitus without complication, without long-term current use of insulin (H), Hyperlipidemia LDL goal <100   Changed by:  Satish Pena MD        TAKE 1/2 TABLET BY MOUTH NIGHTLY AT BEDTIME   Quantity:  45 tablet   Refills:  3       * Notice:  This list has 2  medication(s) that are the same as other medications prescribed for you. Read the directions carefully, and ask your doctor or other care provider to review them with you.         Where to get your medicines      These medications were sent to [id number] - [place], [place] - [address]  [address] [address]     Phone:  [phone]     amLODIPine 10 MG tablet    furosemide 20 MG tablet    losartan 100 MG tablet    metFORMIN 500 MG tablet    simvastatin 20 MG tablet                Primary Care Provider Office Phone # Fax #    Satish Pena [phone]       [address] [facility] [address]        Equal Access to Services     ANTHONY OSUNA : Hadii aad ku hadasho Sotrenton, waaxda luqadaha, qaybta kaalmada adeegyada, bev chandler . So [facility] [phone].    ATENCIÓN: Si habla español, tiene a armstrong disposición servicios gratuitos de asistencia lingüística. Llame al [phone].    We comply with applicable federal civil rights laws and [place] laws. We do not discriminate on the basis of race, color, national origin, age, disability, sex, sexual orientation, or gender identity.            Thank you!     Thank you for choosing [facility]  for your care. Our goal is always to provide you with excellent care. Hearing back from our patients is one way we can continue to improve our services. Please take a few minutes to complete the written survey that you may receive in the mail after your visit with us. Thank you!             Your Updated Medication List - Protect others around you: Learn how to safely use, store and throw away your medicines at www.disposemymeds.org.          This list is accurate as of 4/25/18 10:39 AM.  Always use your most recent med list.                   Brand Name Dispense Instructions for use Diagnosis    * amLODIPine 5 MG tablet    NORVASC    90 tablet    TAKE 1  TABLET (5 MG) BY MOUTH DAILY    Hypertension goal BP (blood pressure) < 140/90       * amLODIPine 10 MG tablet    NORVASC    90 tablet    Take 1 tablet (10 mg) by mouth daily    Hyperlipidemia LDL goal <100       aspirin 81 MG tablet      Take 1 tablet by mouth daily.        blood glucose monitoring meter device kit    no brand specified    1 kit    Use to test blood sugar 2 times daily or as directed.    Glucose intolerance (impaired glucose tolerance), Angioedema, initial encounter       blood glucose monitoring test strip    no brand specified    100 each    Use to test blood sugars 2 times daily or as directed    Angioedema, initial encounter, Glucose intolerance (impaired glucose tolerance)       calcipotriene 0.005 % Soln solution    DOVONEX    1 Bottle    Apply  topically. APPLY bid AS NEEDED    Psoriasis       calcium carbonate-vitamin D 500-400 MG-UNIT Tabs per tablet     180 tablet    ONE BY MOUTH TWICE A DAY    Osteopenia, Type 2 diabetes mellitus without complication (H), Hyperlipidemia LDL goal <100, Special screening for malignant neoplasms, colon, Need for vaccination for Strep pneumoniae       furosemide 20 MG tablet    LASIX    180 tablet    TAKE 2 TABLETS BY MOUTH ONCE DAILY    Generalized edema       losartan 100 MG tablet    COZAAR    90 tablet    Take 1 tablet (100 mg) by mouth daily    Essential hypertension with goal blood pressure less than 140/90       metFORMIN 500 MG tablet    GLUCOPHAGE    90 tablet    Take 1 tablet (500 mg) by mouth daily (with breakfast)    Type 2 diabetes mellitus without complication, without long-term current use of insulin (H)       PROBIOTIC PO           simvastatin 20 MG tablet    ZOCOR    45 tablet    TAKE 1/2 TABLET BY MOUTH NIGHTLY AT BEDTIME    Type 2 diabetes mellitus without complication, without long-term current use of insulin (H), Hyperlipidemia LDL goal <100       TYLENOL PO      Take 2,000 mg by mouth daily    Acute maxillary sinusitis, Right shoulder  injury, initial encounter       * Notice:  This list has 2 medication(s) that are the same as other medications prescribed for you. Read the directions carefully, and ask your doctor or other care provider to review them with you.

## 2018-05-22 ENCOUNTER — THERAPY VISIT (OUTPATIENT)
Dept: PHYSICAL THERAPY | Facility: CLINIC | Age: 83
End: 2018-05-22
Payer: MEDICARE

## 2018-05-22 DIAGNOSIS — M25.511 CHRONIC RIGHT SHOULDER PAIN: Primary | ICD-10-CM

## 2018-05-22 DIAGNOSIS — G89.29 CHRONIC RIGHT SHOULDER PAIN: Primary | ICD-10-CM

## 2018-05-22 PROCEDURE — 97161 PT EVAL LOW COMPLEX 20 MIN: CPT | Mod: GP | Performed by: PHYSICAL THERAPIST

## 2018-05-22 PROCEDURE — G8984 CARRY CURRENT STATUS: HCPCS | Mod: GP | Performed by: PHYSICAL THERAPIST

## 2018-05-22 PROCEDURE — 97140 MANUAL THERAPY 1/> REGIONS: CPT | Mod: GP | Performed by: PHYSICAL THERAPIST

## 2018-05-22 PROCEDURE — 97110 THERAPEUTIC EXERCISES: CPT | Mod: GP | Performed by: PHYSICAL THERAPIST

## 2018-05-22 PROCEDURE — G8985 CARRY GOAL STATUS: HCPCS | Mod: GP | Performed by: PHYSICAL THERAPIST

## 2018-05-22 NOTE — MR AVS SNAPSHOT
After Visit Summary   5/22/2018    Pam Hartman    MRN: [id number]           Patient Information     Date Of Birth          1935        Visit Information        Provider Department      5/22/2018 9:40 AM Araceli Cho PT [facility]        Today's Diagnoses     Chronic right shoulder pain    -  1       Follow-ups after your visit        Your next 10 appointments already scheduled     Jun 06, 2018  9:40 AM CDT   DALTON Extremity with Araceli Mac PT   [facility] ([place])    [address] [address] [phone]              Who to contact     If you have questions or need follow up information about today's clinic visit or your schedule please contact [facility] PH[facility]ERAP[facility] directly at [phone].  Normal or non-critical lab and imaging results will be communicated to you by MyChart, letter or phone within 4 business days after the clinic has received the results. If you do not hear from us within 7 days, please contact the clinic through NovoED[employer] or phone. If you have a critical or abnormal lab result, we will notify you by phone as soon as possible.  Submit refill requests through [employer] or call your pharmacy and they will forward the refill request to us. Please allow 3 business days for your refill to be completed.          Additional Information About Your Visit        MyChart Information     [employer] gives you secure access to your electronic health record. If you see a primary care provider, you can also send messages to your care team and make appointments. If you have questions, please call your primary care clinic.  If you do not have a primary care provider, please call [phone] and they will assist you.        Care EveryWhere ID     This is your Care  EveryWhere ID. This could be used by other organizations to access your [place] medical records  [id number]         Blood Pressure from Last 3 Encounters:   04/25/18 179/80   07/10/17 148/70   12/05/16 138/64    Weight from Last 3 Encounters:   04/25/18 66.7 kg (147 lb)   10/16/17 66.7 kg (147 lb)   08/29/17 65.8 kg (145 lb)              We Performed the Following     HC PT EVAL, LOW COMPLEXITY     [facility] CERT REPORT     [facility] INITIAL EVAL REPORT     MANUAL THER TECH,1+REGIONS,EA 15 MIN     THERAPEUTIC EXERCISES        Primary Care Provider Office Phone # Fax #    Satish Pena [phone]       [address] [facility] [address]        Equal Access to Services     ANTHONY OSUNA : Hadii neda gomes hadasho Soomaali, waaxda luqadaha, qaybta kaalmada adeegyada, bev morales hayjustino rao. So [facility] [phone].    ATENCIÓN: Si habla español, tiene a armstrong disposición servicios gratuitos de asistencia lingüística. Llame al [phone].    We comply with applicable federal civil rights laws and [place] laws. We do not discriminate on the basis of race, color, national origin, age, disability, sex, sexual orientation, or gender identity.            Thank you!     Thank you for choosing [facility]  for your care. Our goal is always to provide you with excellent care. Hearing back from our patients is one way we can continue to improve our services. Please take a few minutes to complete the written survey that you may receive in the mail after your visit with us. Thank you!             Your Updated Medication List - Protect others around you: Learn how to safely use, store and throw away your medicines at www.disposemymeds.org.          This list is accurate as of 5/22/18  7:01 PM.  Always use your most recent med list.                   Brand Name Dispense Instructions for use Diagnosis    * amLODIPine 5 MG tablet    NORVASC    90  tablet    TAKE 1 TABLET (5 MG) BY MOUTH DAILY    Hypertension goal BP (blood pressure) < 140/90       * amLODIPine 10 MG tablet    NORVASC    90 tablet    Take 1 tablet (10 mg) by mouth daily    Hyperlipidemia LDL goal <100       aspirin 81 MG tablet      Take 1 tablet by mouth daily.        blood glucose monitoring meter device kit    no brand specified    1 kit    Use to test blood sugar 2 times daily or as directed.    Glucose intolerance (impaired glucose tolerance), Angioedema, initial encounter       blood glucose monitoring test strip    no brand specified    100 each    Use to test blood sugars 2 times daily or as directed    Angioedema, initial encounter, Glucose intolerance (impaired glucose tolerance)       calcipotriene 0.005 % Soln solution    DOVONEX    1 Bottle    Apply  topically. APPLY bid AS NEEDED    Psoriasis       calcium carbonate-vitamin D 500-400 MG-UNIT Tabs per tablet     180 tablet    ONE BY MOUTH TWICE A DAY    Osteopenia, Type 2 diabetes mellitus without complication (H), Hyperlipidemia LDL goal <100, Special screening for malignant neoplasms, colon, Need for vaccination for Strep pneumoniae       furosemide 20 MG tablet    LASIX    180 tablet    TAKE 2 TABLETS BY MOUTH ONCE DAILY    Generalized edema       losartan 100 MG tablet    COZAAR    90 tablet    Take 1 tablet (100 mg) by mouth daily    Essential hypertension with goal blood pressure less than 140/90       metFORMIN 500 MG tablet    GLUCOPHAGE    90 tablet    Take 1 tablet (500 mg) by mouth daily (with breakfast)    Type 2 diabetes mellitus without complication, without long-term current use of insulin (H)       PROBIOTIC PO           simvastatin 20 MG tablet    ZOCOR    45 tablet    TAKE 1/2 TABLET BY MOUTH NIGHTLY AT BEDTIME    Type 2 diabetes mellitus without complication, without long-term current use of insulin (H), Hyperlipidemia LDL goal <100       TYLENOL PO      Take 2,000 mg by mouth daily    Acute maxillary sinusitis,  Right shoulder injury, initial encounter       * Notice:  This list has 2 medication(s) that are the same as other medications prescribed for you. Read the directions carefully, and ask your doctor or other care provider to review them with you.

## 2018-05-22 NOTE — PROGRESS NOTES
[facility] Initial Evaluation  Subjective:  Patient is a 83 year old female presenting with rehab right shoulder hpi.   Pam Hartman is a 83 year old female with a right shoulder condition.      This is a recurrent and chronic condition  Patient saw MD on 4/25/18 for right shoulder increased stiffness/soreness that she noted began on or about 3/15/18 for no known reason. Patient has history of s/p right reverse TSA 7/19/17.    Patient reports pain:  Lateral and in the joint (general shoulder area).  Radiates to:  Upper arm.  Pain is described as aching (stiffness) and is intermittent and reported as 2/10 (No pain at rest).  Associated symptoms:  Loss of motion/stiffness and loss of strength. Pain is worse in the A.M..  Symptoms are exacerbated by using arm overhead, using arm behind back, lying on extremity, certain positions, lifting and using arm at shoulder level and relieved by activity/movement.  Since onset symptoms are unchanged.  Special testing: none.  Previous treatment includes physical therapy (following R reverse TSA, 8/8/17 to 12/20/17).  There was significant improvement following previous treatment.  General health as reported by patient is good.  Pertinent medical history includes:  High blood pressure and diabetes.  Medical allergies: yes (sulfa).  Other surgeries include:  Orthopedic surgery (7/19/17 s/p R reverse TSA).  Current medications:  High blood pressure medication.  Current occupation is retired.    Employment tasks: N/A.    Barriers include:  None as reported by the patient.    Red flags:  None as reported by the patient.                        Objective:  Standing Alignment:    Cervical/Thoracic:  Forward head  Shoulder/UE:  Rounded shoulders                  Flexibility/Screens:     Upper Extremity:        Decreased right upper extremity flexibility present at:  Pectoralis Major and Pectoralis Minor                           Shoulder Evaluation:  ROM:  AROM:     Flexion:  Right:  140  Extension: Right: 40  Abduction:  Right:  130      External Rotation:  Right:  50            Extension/Internal Rotation:  Right:  R PSIS    PROM:  normal (WFL)                            Pain: increased reaching forward and out to side with right shoulder  Endfeel: soft  Strength:    Flexion: Left:4+/5    Pain: -    Right: 4/5      Pain:  -/+  Extension:  Left: 5/5     Pain:-    Right: 4+/5     Pain:-/+  Abduction:  Left: 4+/5   Pain:-    Right: 4/5      Pain:-/+  Adduction:  Left: 5/5     Pain:-    Right: 4+/5      Pain:-/+  Internal Rotation:  Left:5/5      Pain:-    Right: 4+/5      Pain:-/+  External Rotation:   Left:4+/5      Pain:-   Right:3+/5      Pain:-/+      Horizontal Adduction:  Right:/5     Pain:-/+          Palpation:      Right shoulder tenderness present at: Deltoid; Levator and Upper Trap                                     General     ROS    Assessment/Plan:    Patient is a 83 year old female with right shoulder complaints.    Patient has the following significant findings with corresponding treatment plan.                Diagnosis 1:  Chronic shoulder pain  Pain -  manual therapy, self management, education and home program  Decreased ROM/flexibility - manual therapy, therapeutic exercise, therapeutic activity and home program  Decreased strength - therapeutic exercise, therapeutic activities and home program  Decreased function - therapeutic activities and home program    Therapy Evaluation Codes:   1) History comprised of:   Personal factors that impact the plan of care:      Time since onset of symptoms.    Comorbidity factors that impact the plan of care are:      None.     Medications impacting care: None.  2) Examination of Body Systems comprised of:   Body structures and functions that impact the plan of care:      Shoulder.   Activity limitations that impact the plan of care are:      Bathing, Driving, Dressing, Lifting, Sleeping and Laying down.  3) Clinical  presentation characteristics are:   Stable/Uncomplicated.  4) Decision-Making    Low complexity using standardized patient assessment instrument and/or measureable assessment of functional outcome.  Cumulative Therapy Evaluation is: Low complexity.    Previous and current functional limitations:  (See Goal Flow Sheet for this information)    Short term and Long term goals: (See Goal Flow Sheet for this information)     Communication ability:  Patient appears to be able to clearly communicate and understand verbal and written communication and follow directions correctly.  Treatment Explanation - The following has been discussed with the patient:   RX ordered/plan of care  Anticipated outcomes  Possible risks and side effects  This patient would benefit from PT intervention to resume normal activities.   Rehab potential is good.    Frequency:  1 X week, once daily every other week   Duration:  for 12 weeks(6 visits total)  Discharge Plan:  Achieve all LTG.  Independent in home treatment program.  Reach maximal therapeutic benefit.    Please refer to the daily flowsheet for treatment today, total treatment time and time spent performing 1:1 timed codes.

## 2018-05-22 NOTE — LETTER
DEPARTMENT OF HEALTH AND HUMAN SERVICES  CENTERS FOR MEDICARE & MEDICAID SERVICES    PLAN/UPDATED PLAN OF PROGRESS FOR OUTPATIENT REHABILITATION    PATIENTS NAME:  Pam Hartman   : 1935  PROVIDER NUMBER:    [id number]    [id number]     PROVIDER NAME: [facility] PH[facility]ERAP[facility]    MEDICAL RECORD NUMBER: [id number]     START OF CARE DATE:  SOC Date: 18   TYPE:  PT    PRIMARY/TREATMENT DIAGNOSIS: (Pertinent Medical Diagnosis)  Chronic right shoulder pain    VISITS FROM START OF CARE:  Rxs Used: 1     Subjective:  Patient is a 83 year old female presenting with rehab right shoulder hpi.   Pam Hartman is a 83 year old female with a right shoulder condition.      This is a recurrent and chronic condition  Patient saw MD on 18 for right shoulder increased stiffness/soreness that she noted began on or about 3/15/18 for no known reason. Patient has history of s/p right reverse TSA 17.    Patient reports pain:  Lateral and in the joint (general shoulder area).  Radiates to:  Upper arm.  Pain is described as aching (stiffness) and is intermittent and reported as 2/10 (No pain at rest).  Associated symptoms:  Loss of motion/stiffness and loss of strength. Pain is worse in the A.M..  Symptoms are exacerbated by using arm overhead, using arm behind back, lying on extremity, certain positions, lifting and using arm at shoulder level and relieved by activity/movement.  Since onset symptoms are unchanged.  Special testing: none.  Previous treatment includes physical therapy (following R reverse TSA, 17 to 17).  There was significant improvement following previous treatment.  General health as reported by patient is good.  Pertinent medical history includes:  High blood pressure and diabetes.  Medical allergies: yes (sulfa).  Other surgeries include:  Orthopedic surgery (17 s/p R reverse TSA).  Current medications:  High blood pressure  medication.  Current occupation is retired.    Employment tasks: N/A.  Barriers include:  None as reported by the patient.  Red flags:  None as reported by the patient.                  Objective:  Standing Alignment:    Cervical/Thoracic:  Forward head  Shoulder/UE:  Rounded shoulders  Flexibility/Screens:   Upper Extremity:    Decreased right upper extremity flexibility present at:  Pectoralis Major and Pectoralis Minor      PATIENTS NAME:  Pam Hartman   : 1935          Shoulder Evaluation:  ROM:  AROM:    Flexion:  Right:  140  Extension: Right: 40  Abduction:  Right:  130  External Rotation:  Right:  50  Extension/Internal Rotation:  Right:  R PSIS    PROM:  normal (WFL)  Pain: increased reaching forward and out to side with right shoulder  Endfeel: soft  Strength:    Flexion: Left:4+/5    Pain: -    Right: 4/5      Pain:  -/+  Extension:  Left: 5/5     Pain:-    Right: 4+/5     Pain:-/+  Abduction:  Left: 4+/5   Pain:-    Right: 4/5      Pain:-/+  Adduction:  Left: 5/5     Pain:-    Right: 4+/5      Pain:-/+  Internal Rotation:  Left:5/5      Pain:-    Right: 4+/5      Pain:-/+  External Rotation:   Left:4+/5      Pain:-   Right:3+/5      Pain:-/+    Horizontal Adduction:  Right:/5     Pain:-/+  Palpation:    Right shoulder tenderness present at: Deltoid; Levator and Upper Trap    Assessment/Plan:    Patient is a 83 year old female with right shoulder complaints.    Patient has the following significant findings with corresponding treatment plan.                Diagnosis 1:  Chronic shoulder pain  Pain -  manual therapy, self management, education and home program  Decreased ROM/flexibility - manual therapy, therapeutic exercise, therapeutic activity and home program  Decreased strength - therapeutic exercise, therapeutic activities and home program  Decreased function - therapeutic activities and home program    Therapy Evaluation Codes:   1) History comprised of:   Personal factors that impact the  plan of care:      Time since onset of symptoms.    Comorbidity factors that impact the plan of care are:      None.     Medications impacting care: None.  2) Examination of Body Systems comprised of:   Body structures and functions that impact the plan of care:      Shoulder.   Activity limitations that impact the plan of care are:      Bathing, Driving, Dressing, Lifting, Sleeping and Laying down.  3) Clinical presentation characteristics are:   Stable/Uncomplicated.  4) Decision-Making    Low complexity using standardized patient assessment instrument and/or measureable assessment of functional outcome.  Cumulative Therapy Evaluation is: Low complexity.  PATIENTS NAME:  Pam Hartman   : 1935    Previous and current functional limitations:  (See Goal Flow Sheet for this information)    Short term and Long term goals: (See Goal Flow Sheet for this information)     Communication ability:  Patient appears to be able to clearly communicate and understand verbal and written communication and follow directions correctly.  Treatment Explanation - The following has been discussed with the patient:   RX ordered/plan of care  Anticipated outcomes  Possible risks and side effects  This patient would benefit from PT intervention to resume normal activities.   Rehab potential is good.    Frequency:  1 X week, once daily every other week   Duration:  for 12 weeks(6 visits total)  Discharge Plan:  Achieve all LTG.  Independent in home treatment program.  Reach maximal therapeutic benefit.    Caregiver Signature/Credentials _____________________________ Date ________       Treating Provider: Araceli Cantu, PT     I have reviewed and certified the need for these services and plan of treatment while under my care.        PHYSICIAN'S SIGNATURE:   _________________________________________  Date___________   Satish Pena MD    Certification period:  Beginning of Cert date period: 18 to  End of Cert  "period date: 08/19/18     Functional Level Progress Report: Please see attached \"Goal Flow sheet for Functional level.\"    ____X____ Continue Services or       ________ DC Services                Service dates: From  SOC Date: 05/22/18 date to present                         "

## 2018-06-06 ENCOUNTER — TELEPHONE (OUTPATIENT)
Dept: FAMILY MEDICINE | Facility: CLINIC | Age: 83
End: 2018-06-06

## 2018-06-06 ENCOUNTER — THERAPY VISIT (OUTPATIENT)
Dept: PHYSICAL THERAPY | Facility: CLINIC | Age: 83
End: 2018-06-06
Payer: MEDICARE

## 2018-06-06 DIAGNOSIS — G89.29 CHRONIC RIGHT SHOULDER PAIN: Primary | ICD-10-CM

## 2018-06-06 DIAGNOSIS — M25.511 CHRONIC RIGHT SHOULDER PAIN: Primary | ICD-10-CM

## 2018-06-06 PROCEDURE — 97140 MANUAL THERAPY 1/> REGIONS: CPT | Mod: GP | Performed by: PHYSICAL THERAPIST

## 2018-06-06 PROCEDURE — 97110 THERAPEUTIC EXERCISES: CPT | Mod: GP | Performed by: PHYSICAL THERAPIST

## 2018-06-06 NOTE — TELEPHONE ENCOUNTER
Forms received from: [facility]   Phone number listed: [phone]   Fax listed: [phone]  Date received: 6-6-18  Form description: updated plan of progress  Once forms are completed, please return to [facility] via fax.  Is patient requesting to be contacted when forms are completed: n/a  Form placed: provider desk  Radha Mendoza

## 2018-07-10 ENCOUNTER — THERAPY VISIT (OUTPATIENT)
Dept: PHYSICAL THERAPY | Facility: CLINIC | Age: 83
End: 2018-07-10
Payer: MEDICARE

## 2018-07-10 DIAGNOSIS — G89.29 CHRONIC RIGHT SHOULDER PAIN: ICD-10-CM

## 2018-07-10 DIAGNOSIS — M25.511 CHRONIC RIGHT SHOULDER PAIN: ICD-10-CM

## 2018-07-10 PROCEDURE — 97140 MANUAL THERAPY 1/> REGIONS: CPT | Mod: GP | Performed by: PHYSICAL THERAPIST

## 2018-07-10 PROCEDURE — G8986 CARRY D/C STATUS: HCPCS | Mod: GP | Performed by: PHYSICAL THERAPIST

## 2018-07-10 PROCEDURE — 97110 THERAPEUTIC EXERCISES: CPT | Mod: GP | Performed by: PHYSICAL THERAPIST

## 2018-07-10 PROCEDURE — G8985 CARRY GOAL STATUS: HCPCS | Mod: GP | Performed by: PHYSICAL THERAPIST

## 2018-07-10 NOTE — MR AVS SNAPSHOT
After Visit Summary   7/10/2018    Pam Hartman    MRN: [id number]           Patient Information     Date Of Birth          1935        Visit Information        Provider Department      7/10/2018 11:00 AM Araceli Cho PT [facility]        Today's Diagnoses     Chronic right shoulder pain           Follow-ups after your visit        Your next 10 appointments already scheduled     Jul 23, 2018  9:30 AM CDT   Return Visit with Nahun Skinner MD   [facility] ([facility]    [address] [facility] [address] [phone]              Who to contact     If you have questions or need follow up information about today's clinic visit or your schedule please contact [facility]ICAL [facility] directly at [phone].  Normal or non-critical lab and imaging results will be communicated to you by MyChart, letter or phone within 4 business days after the clinic has received the results. If you do not hear from us within 7 days, please contact the clinic through Benchlinghart or phone. If you have a critical or abnormal lab result, we will notify you by phone as soon as possible.  Submit refill requests through [employer] or call your pharmacy and they will forward the refill request to us. Please allow 3 business days for your refill to be completed.          Additional Information About Your Visit        MyChart Information     [employer] gives you secure access to your electronic health record. If you see a primary care provider, you can also send messages to your care team and make appointments. If you have questions, please call your primary care clinic.  If you do not have a primary care provider, please call [phone] and they will assist you.        Care EveryWhere ID     This is your Care EveryWhere ID. This could be used by other organizations to access  your [place] medical records  [id number]         Blood Pressure from Last 3 Encounters:   04/25/18 179/80   07/10/17 148/70   12/05/16 138/64    Weight from Last 3 Encounters:   04/25/18 66.7 kg (147 lb)   10/16/17 66.7 kg (147 lb)   08/29/17 65.8 kg (145 lb)              We Performed the Following     [facility] PROGRESS NOTES REPORT     MANUAL THER TECH,1+REGIONS,EA 15 MIN     THERAPEUTIC EXERCISES        Primary Care Provider Office Phone # Fax #    Satish Pena [phone]       [address] [facility] [address]        Equal Access to Services     [facility] HospitalALEXANDRA : Hadii aad eliseo hadasho [place], waaxda luqadaha, qaybta kaalmada adeegyada, bev chandler . So [facility] [phone].    ATENCIÓN: Si habla español, tiene a armstrong disposición servicios gratuitos de asistencia lingüística. Llame al [phone].    We comply with applicable federal civil rights laws and [place] laws. We do not discriminate on the basis of race, color, national origin, age, disability, sex, sexual orientation, or gender identity.            Thank you!     Thank you for choosing [facility] PHYSICAL THERAPY  for your care. Our goal is always to provide you with excellent care. Hearing back from our patients is one way we can continue to improve our services. Please take a few minutes to complete the written survey that you may receive in the mail after your visit with us. Thank you!             Your Updated Medication List - Protect others around you: Learn how to safely use, store and throw away your medicines at www.disposemymeds.org.          This list is accurate as of 7/10/18 11:59 PM.  Always use your most recent med list.                   Brand Name Dispense Instructions for use Diagnosis    * amLODIPine 5 MG tablet    NORVASC    90 tablet    TAKE 1 TABLET (5 MG) BY MOUTH DAILY    Hypertension goal BP (blood pressure) < 140/90       * amLODIPine  10 MG tablet    NORVASC    90 tablet    Take 1 tablet (10 mg) by mouth daily    Hyperlipidemia LDL goal <100       aspirin 81 MG tablet      Take 1 tablet by mouth daily.        blood glucose monitoring meter device kit    no brand specified    1 kit    Use to test blood sugar 2 times daily or as directed.    Glucose intolerance (impaired glucose tolerance), Angioedema, initial encounter       blood glucose monitoring test strip    no brand specified    100 each    Use to test blood sugars 2 times daily or as directed    Angioedema, initial encounter, Glucose intolerance (impaired glucose tolerance)       calcipotriene 0.005 % Soln solution    DOVONEX    1 Bottle    Apply  topically. APPLY bid AS NEEDED    Psoriasis       calcium carbonate-vitamin D 500-400 MG-UNIT Tabs per tablet     180 tablet    ONE BY MOUTH TWICE A DAY    Osteopenia, Type 2 diabetes mellitus without complication (H), Hyperlipidemia LDL goal <100, Special screening for malignant neoplasms, colon, Need for vaccination for Strep pneumoniae       furosemide 20 MG tablet    LASIX    180 tablet    TAKE 2 TABLETS BY MOUTH ONCE DAILY    Generalized edema       losartan 100 MG tablet    COZAAR    90 tablet    Take 1 tablet (100 mg) by mouth daily    Essential hypertension with goal blood pressure less than 140/90       metFORMIN 500 MG tablet    GLUCOPHAGE    90 tablet    Take 1 tablet (500 mg) by mouth daily (with breakfast)    Type 2 diabetes mellitus without complication, without long-term current use of insulin (H)       PROBIOTIC PO           simvastatin 20 MG tablet    ZOCOR    45 tablet    TAKE 1/2 TABLET BY MOUTH NIGHTLY AT BEDTIME    Type 2 diabetes mellitus without complication, without long-term current use of insulin (H), Hyperlipidemia LDL goal <100       TYLENOL PO      Take 2,000 mg by mouth daily    Acute maxillary sinusitis, Right shoulder injury, initial encounter       * Notice:  This list has 2 medication(s) that are the same as other  medications prescribed for you. Read the directions carefully, and ask your doctor or other care provider to review them with you.

## 2018-07-10 NOTE — LETTER
[facility] ATHLETIC [facility] PHYSICAL THERAPY  [address]  [facility] [address]  [phone]    2018    Re: Pam Hartman   :   1935  MRN:  [id number]   REFERRING PHYSICIAN:   Satish Pena    [facility] ATHLETIC [facility] PHYSICAL [facility]    Date of Initial Evaluation: 2018  Visits:  Rxs Used: 3  Reason for Referral:  Chronic right shoulder pain    DISCHARGE REPORT  Progress reporting period is from 18 to 7/10/18.   Patient has completed 3 PT visits.    SUBJECTIVE  Subjective: Patient now having more confidence with her ability to perform all activities throughout each day without losing any progress she has made. She continues to perform her HEP several days a week. Able to repetitively use right UE during all ADL's without increased pain, only an ocassional ache.  Will be seeing her surgeon for 1 year follow-up in the next several weeks.  Current Pain level: 0/10.     Previous pain level was  2/10  .   Changes in function:  Yes (See Goal flowsheet attached for changes in current functional level)  Adverse reaction to treatment or activity: None    OBJECTIVE  Objective: AROM of R shoulder: flexion 140, improved abduction to 135 both with shoulder hiking continued, improved ER to 65, improved to IR/ext to R central L5. R UT/levator increased muscle tension and tenderness remains. MMT of R shoulder remains 4 to 4+/5. Reviewed entire HEP and pt is able to reproduce it correctly.    ASSESSMENT/PLAN  Updated problem list and treatment plan: Diagnosis 1:  Chronic R shoulder pain(s/p R reverse TSA)  Decreased ROM/flexibility - home program  Decreased strength - home program  STG/LTGs have been met or progress has been made towards goals:  Yes (See Goal flow sheet completed today.)  Assessment of Progress: The patient has met all of their long term goals.  Self Management Plans:  Patient is independent in a home treatment  program.  Patient is independent in self management of symptoms.  I have re-evaluated this patient and find that the nature, scope, duration and intensity of the therapy is appropriate for the medical condition of the patient.  Pam continues to require the following intervention to meet STG and LTG's:  PT intervention is no longer required to meet STG/LTG.    Re: Pam Hartman   :   1935    Recommendations:  This patient is ready to be discharged from therapy and continue their home treatment program.    Thank you for your referral.    INQUIRIES  Therapist: Araceli Malone, PT  [facility]  [address] [address]  Phone: [phone]  Fax: [phone]

## 2018-07-12 PROBLEM — G89.29 CHRONIC RIGHT SHOULDER PAIN: Status: RESOLVED | Noted: 2018-06-06 | Resolved: 2018-07-10

## 2018-07-12 PROBLEM — M25.511 CHRONIC RIGHT SHOULDER PAIN: Status: RESOLVED | Noted: 2018-06-06 | Resolved: 2018-07-10

## 2018-07-12 NOTE — PROGRESS NOTES
Subjective:  HPI                    Objective:  System    Physical Exam    General     ROS    Assessment/Plan:    DISCHARGE REPORT    Progress reporting period is from 5/22/18 to 7/10/18.   Patient has completed 3 PT visits.    SUBJECTIVE  Subjective: Patient now having more confidence with her ability to perform all activities throughout each day without losing any progress she has made. She continues to perform her HEP several days a week. Able to repetitively use right UE during all ADL's without increased pain, only an ocassional ache.  Will be seeing her surgeon for 1 year follow-up in the next several weeks.  Current Pain level: 0/10.     Previous pain level was  2/10  .   Changes in function:  Yes (See Goal flowsheet attached for changes in current functional level)  Adverse reaction to treatment or activity: None    OBJECTIVE    Objective: AROM of R shoulder: flexion 140, improved abduction to 135 both with shoulder hiking continued, improved ER to 65, improved to IR/ext to R central L5. R UT/levator increased muscle tension and tenderness remains. MMT of R shoulder remains 4 to 4+/5. Reviewed entire HEP and pt is able to reproduce it correctly.    ASSESSMENT/PLAN  Updated problem list and treatment plan: Diagnosis 1:  Chronic R shoulder pain(s/p R reverse TSA)  Decreased ROM/flexibility - home program  Decreased strength - home program  STG/LTGs have been met or progress has been made towards goals:  Yes (See Goal flow sheet completed today.)  Assessment of Progress: The patient has met all of their long term goals.  Self Management Plans:  Patient is independent in a home treatment program.  Patient is independent in self management of symptoms.  I have re-evaluated this patient and find that the nature, scope, duration and intensity of the therapy is appropriate for the medical condition of the patient.  Pam continues to require the following intervention to meet STG and LTG's:  PT intervention is no  longer required to meet STG/LTG.    Recommendations:  This patient is ready to be discharged from therapy and continue their home treatment program.    Please refer to the daily flowsheet for treatment today, total treatment time and time spent performing 1:1 timed codes.

## 2018-07-23 ENCOUNTER — RADIANT APPOINTMENT (OUTPATIENT)
Dept: GENERAL RADIOLOGY | Facility: CLINIC | Age: 83
End: 2018-07-23
Attending: ORTHOPAEDIC SURGERY
Payer: MEDICARE

## 2018-07-23 ENCOUNTER — OFFICE VISIT (OUTPATIENT)
Dept: ORTHOPEDICS | Facility: CLINIC | Age: 83
End: 2018-07-23
Payer: MEDICARE

## 2018-07-23 VITALS
WEIGHT: 145 LBS | BODY MASS INDEX: 28.47 KG/M2 | HEART RATE: 81 BPM | RESPIRATION RATE: 13 BRPM | HEIGHT: 60 IN | DIASTOLIC BLOOD PRESSURE: 66 MMHG | SYSTOLIC BLOOD PRESSURE: 124 MMHG

## 2018-07-23 DIAGNOSIS — Z96.611 STATUS POST REVERSE TOTAL ARTHROPLASTY OF RIGHT SHOULDER: Primary | ICD-10-CM

## 2018-07-23 DIAGNOSIS — Z96.611 STATUS POST REVERSE TOTAL ARTHROPLASTY OF RIGHT SHOULDER: ICD-10-CM

## 2018-07-23 PROCEDURE — 99213 OFFICE O/P EST LOW 20 MIN: CPT | Performed by: ORTHOPAEDIC SURGERY

## 2018-07-23 PROCEDURE — 73030 X-RAY EXAM OF SHOULDER: CPT | Mod: RT

## 2018-07-23 NOTE — MR AVS SNAPSHOT
After Visit Summary   7/23/2018    Pam Hartman    MRN: [id number]           Patient Information     Date Of Birth          1935        Visit Information        Provider Department      7/23/2018 9:30 AM Nahun Skinner MD [facility]        Today's Diagnoses     Status post reverse total arthroplasty of right shoulder    -  1      Care Instructions    Activity as tolerated. Lifting to 15 pounds.  Take antibiotics before dental work.  Return to clinic 2-4 years with xray  right shoulder .          Follow-ups after your visit        Your next 10 appointments already scheduled     Oct 01, 2018  9:30 AM CDT   LAB with CP LAB   [facility] ([facility]    [address] [id number] [phone]           Please do not eat 10-12 hours before your appointment if you are coming in fasting for labs on lipids, cholesterol, or glucose (sugar). This does not apply to pregnant women. Water, hot tea and black coffee (with nothing added) are okay. Do not drink other fluids, diet soda or chew gum.            Oct 08, 2018 10:20 AM CDT   SHORT with Satish Pena MD   [facility] ([facility])    [address] [address] [phone]              Who to contact     If you have questions or need follow up information about today's clinic visit or your schedule please contact [facility] directly at [phone].  Normal or non-critical lab and imaging results will be communicated to you by MyChart, letter or phone within 4 business days after the clinic has received the results. If you do not hear from us within 7 days, please contact the clinic through MyChart or phone. If you have a critical or abnormal lab result, we will notify you by phone as soon as possible.  Submit refill requests through [employer] or call your  pharmacy and they will forward the refill request to us. Please allow 3 business days for your refill to be completed.          Additional Information About Your Visit        [employer] Information     [employer] gives you secure access to your electronic health record. If you see a primary care provider, you can also send messages to your care team and make appointments. If you have questions, please call your primary care clinic.  If you do not have a primary care provider, please call [phone] and they will assist you.        Care EveryWhere ID     This is your Care EveryWhere ID. This could be used by other organizations to access your [place] medical records  [id number]        Your Vitals Were     Pulse Respirations Height BMI (Body Mass Index)          81 13 1.524 m (5') 28.32 kg/m2         Blood Pressure from Last 3 Encounters:   07/23/18 124/66   04/25/18 179/80   07/10/17 148/70    Weight from Last 3 Encounters:   07/23/18 65.8 kg (145 lb)   04/25/18 66.7 kg (147 lb)   10/16/17 66.7 kg (147 lb)               Primary Care Provider Office Phone # Fax #    Satish Pena [phone]       [address] [facility] [address]        Equal Access to Services     ANTHONY OSUNA : Hadii neda ku hadasho Soomaali, waaxda luqadaha, qaybta kaalmada adeegyada, waxay andrew hayaidan lyubov peña lacolton rao. So [facility] [phone].    ATENCIÓN: Si habla español, tiene a armstrong disposición servicios gratuitos de asistencia lingüística. Llame al [phone].    We comply with applicable federal civil rights laws and [place] laws. We do not discriminate on the basis of race, color, national origin, age, disability, sex, sexual orientation, or gender identity.            Thank you!     Thank you for choosing [facility]  for your care. Our goal is always to provide you with excellent care. Hearing back from our patients is one way we can continue to improve our services. Please take a few  minutes to complete the written survey that you may receive in the mail after your visit with us. Thank you!             Your Updated Medication List - Protect others around you: Learn how to safely use, store and throw away your medicines at www.disposemymeds.org.          This list is accurate as of 7/23/18  9:39 AM.  Always use your most recent med list.                   Brand Name Dispense Instructions for use Diagnosis    * amLODIPine 5 MG tablet    NORVASC    90 tablet    TAKE 1 TABLET (5 MG) BY MOUTH DAILY    Hypertension goal BP (blood pressure) < 140/90       * amLODIPine 10 MG tablet    NORVASC    90 tablet    Take 1 tablet (10 mg) by mouth daily    Hyperlipidemia LDL goal <100       aspirin 81 MG tablet      Take 1 tablet by mouth daily.        blood glucose monitoring meter device kit    no brand specified    1 kit    Use to test blood sugar 2 times daily or as directed.    Glucose intolerance (impaired glucose tolerance), Angioedema, initial encounter       blood glucose monitoring test strip    no brand specified    100 each    Use to test blood sugars 2 times daily or as directed    Angioedema, initial encounter, Glucose intolerance (impaired glucose tolerance)       calcipotriene 0.005 % Soln solution    DOVONEX    1 Bottle    Apply  topically. APPLY bid AS NEEDED    Psoriasis       calcium carbonate-vitamin D 500-400 MG-UNIT Tabs per tablet     180 tablet    ONE BY MOUTH TWICE A DAY    Osteopenia, Type 2 diabetes mellitus without complication (H), Hyperlipidemia LDL goal <100, Special screening for malignant neoplasms, colon, Need for vaccination for Strep pneumoniae       furosemide 20 MG tablet    LASIX    180 tablet    TAKE 2 TABLETS BY MOUTH ONCE DAILY    Generalized edema       losartan 100 MG tablet    COZAAR    90 tablet    Take 1 tablet (100 mg) by mouth daily    Essential hypertension with goal blood pressure less than 140/90       metFORMIN 500 MG tablet    GLUCOPHAGE    90 tablet    Take  1 tablet (500 mg) by mouth daily (with breakfast)    Type 2 diabetes mellitus without complication, without long-term current use of insulin (H)       PROBIOTIC PO           simvastatin 20 MG tablet    ZOCOR    45 tablet    TAKE 1/2 TABLET BY MOUTH NIGHTLY AT BEDTIME    Type 2 diabetes mellitus without complication, without long-term current use of insulin (H), Hyperlipidemia LDL goal <100       TYLENOL PO      Take 2,000 mg by mouth daily    Acute maxillary sinusitis, Right shoulder injury, initial encounter       * Notice:  This list has 2 medication(s) that are the same as other medications prescribed for you. Read the directions carefully, and ask your doctor or other care provider to review them with you.

## 2018-07-23 NOTE — LETTER
7/23/2018         RE: Pam Hartman  [address]  [facility] [address]        Dear Colleague,    Thank you for referring your patient, Pam Hartman, to the [facility]. Please see a copy of my visit note below.    Pam Hartman is in for routine follow up of right reverse total shoulder arthroplasty on 7/19/17.    She has no complaints.    Past Medical History:   Diagnosis Date     Colon polyp      Degenerative arthritis      DM II (diabetes mellitus, type II)      HTN      Hyperlipidemia LDL goal < 100      IBS (irritable bowel syndrome)      Osteopenia      Psoriasis      Type 2 diabetes mellitus without complication (H) 10/21/2015     Ulnar neuropathy        Past Surgical History:   Procedure Laterality Date     EYE SURGERY  7/2015    Left and right cataract repair     REVERSE ARTHROPLASTY SHOULDER Right 07/19/2017    Nahun Skinner MD at [facility]     SEPTOPLASTY  1/16/2014    Procedure: SEPTOPLASTY;  Septoplasty, Left ethmoidectomy, Left endoscopic maxillary antrostomy, right kike bullosa;  Surgeon: Sarah Garcia MD;  Location: [facility] OR     TONSILLECTOMY & ADENOIDECTOMY         Family History   Problem Relation Age of Onset     Cerebrovascular Disease Father        Social History     Social History     Marital status:      Spouse name: N/A     Number of children: N/A     Years of education: N/A     Occupational History     Not on file.     Social History Main Topics     Smoking status: Former Smoker     Quit date: 11/30/1984     Smokeless tobacco: Never Used     Alcohol use Yes      Comment: 2 DRINKS PER MONTH     Drug use: No     Sexual activity: No     Other Topics Concern     Parent/Sibling W/ Cabg, Mi Or Angioplasty Before 65f 55m? No     Social History Narrative       Current Outpatient Prescriptions   Medication Sig Dispense Refill     Acetaminophen (TYLENOL PO) Take 2,000 mg by mouth daily        amLODIPine (NORVASC) 10 MG tablet Take 1  tablet (10 mg) by mouth daily 90 tablet 3     amLODIPine (NORVASC) 5 MG tablet TAKE 1 TABLET (5 MG) BY MOUTH DAILY 90 tablet 2     aspirin 81 MG tablet Take 1 tablet by mouth daily.       blood glucose monitoring (NO BRAND SPECIFIED) meter device kit Use to test blood sugar 2 times daily or as directed. 1 kit 0     blood glucose monitoring (NO BRAND SPECIFIED) test strip Use to test blood sugars 2 times daily or as directed 100 each 12     calcipotriene (DOVONEX) 0.005 % SOLN Apply  topically. APPLY bid AS NEEDED 1 Bottle 4     calcium carbonate-vitamin D 500-400 MG-UNIT TABS tablt ONE BY MOUTH TWICE A  tablet 3     furosemide (LASIX) 20 MG tablet TAKE 2 TABLETS BY MOUTH ONCE DAILY 180 tablet 3     losartan (COZAAR) 100 MG tablet Take 1 tablet (100 mg) by mouth daily 90 tablet 3     metFORMIN (GLUCOPHAGE) 500 MG tablet Take 1 tablet (500 mg) by mouth daily (with breakfast) 90 tablet 3     Probiotic Product (PROBIOTIC PO)        simvastatin (ZOCOR) 20 MG tablet TAKE 1/2 TABLET BY MOUTH NIGHTLY AT BEDTIME 45 tablet 3       Allergies   Allergen Reactions     Septra [Bactrim]      Sulfa Drugs Rash       REVIEW OF SYSTEMS:  CONSTITUTIONAL:  NEGATIVE for fever, chills, change in weight  INTEGUMENTARY/SKIN:  NEGATIVE for worrisome rashes, moles or lesions  EYES:  NEGATIVE for vision changes or irritation  ENT/MOUTH:  NEGATIVE for ear, mouth and throat problems  RESP:  NEGATIVE for significant cough or SOB  BREAST:  NEGATIVE for masses, tenderness or discharge  CV:  NEGATIVE for chest pain, palpitations or peripheral edema  GI:  NEGATIVE for nausea, abdominal pain, heartburn, or change in bowel habits  :  Negative   MUSCULOSKELETAL:  See HPI above  NEURO:  NEGATIVE for weakness, dizziness or paresthesias  ENDOCRINE:  NEGATIVE for temperature intolerance, skin/hair changes  HEME/ALLERGY/IMMUNE:  NEGATIVE for bleeding problems  PSYCHIATRIC:  NEGATIVE for changes in mood or affect         Xray today shows good  position of components.  No sign of loosening or wear.     Exam:    Vitals: /66  Pulse 81  Resp 13  Ht 1.524 m (5')  Wt 65.8 kg (145 lb)  BMI 28.32 kg/m2  BMI= Body mass index is 28.32 kg/(m^2)..  Range of motion:   Left Flexion:  160 degrees.  Right flexion:  145 degrees.  Left external rotation 70 degrees, internal rotation T8 degrees.  No pain.  Right external rotation 70 degrees, internal rotation S1 degrees.  No pain.  Strength:   External rotation:  4/5  Internal rotation: 5/5  Abduction: 4/5    Sensation, motor, and circulation intact.  Well healed scar.  No erythema or increased warmth.      Assessment:  right reverse total shoulder arthroplasty doing well.    Plan:  Activity as tolerated.  Take antibiotics before dental work.  Return to clinic 2-4 years with xray  right shoulder .    Again, thank you for allowing me to participate in the care of your patient.        Sincerely,        Nahun Skinner MD

## 2018-07-23 NOTE — PROGRESS NOTES
Pam Hartman is in for routine follow up of right reverse total shoulder arthroplasty on 7/19/17.    She has no complaints.    Past Medical History:   Diagnosis Date     Colon polyp      Degenerative arthritis      DM II (diabetes mellitus, type II)      HTN      Hyperlipidemia LDL goal < 100      IBS (irritable bowel syndrome)      Osteopenia      Psoriasis      Type 2 diabetes mellitus without complication (H) 10/21/2015     Ulnar neuropathy        Past Surgical History:   Procedure Laterality Date     EYE SURGERY  7/2015    Left and right cataract repair     REVERSE ARTHROPLASTY SHOULDER Right 07/19/2017    [facility] MD at [facility]     SEPTOPLASTY  1/16/2014    Procedure: SEPTOPLASTY;  Septoplasty, Left ethmoidectomy, Left endoscopic maxillary antrostomy, right kike bullosa;  Surgeon: Sarah Garcia MD;  Location: [facility] OR     TONSILLECTOMY & ADENOIDECTOMY         Family History   Problem Relation Age of Onset     Cerebrovascular Disease Father        Social History     Social History     Marital status:      Spouse name: N/A     Number of children: N/A     Years of education: N/A     Occupational History     Not on file.     Social History Main Topics     Smoking status: Former Smoker     Quit date: 11/30/1984     Smokeless tobacco: Never Used     Alcohol use Yes      Comment: 2 DRINKS PER MONTH     Drug use: No     Sexual activity: No     Other Topics Concern     Parent/Sibling W/ Cabg, Mi Or Angioplasty Before 65f 55m? No     Social History Narrative       Current Outpatient Prescriptions   Medication Sig Dispense Refill     Acetaminophen (TYLENOL PO) Take 2,000 mg by mouth daily        amLODIPine (NORVASC) 10 MG tablet Take 1 tablet (10 mg) by mouth daily 90 tablet 3     amLODIPine (NORVASC) 5 MG tablet TAKE 1 TABLET (5 MG) BY MOUTH DAILY 90 tablet 2     aspirin 81 MG tablet Take 1 tablet by mouth daily.       blood glucose monitoring (NO BRAND SPECIFIED) meter device kit Use to  test blood sugar 2 times daily or as directed. 1 kit 0     blood glucose monitoring (NO BRAND SPECIFIED) test strip Use to test blood sugars 2 times daily or as directed 100 each 12     calcipotriene (DOVONEX) 0.005 % SOLN Apply  topically. APPLY bid AS NEEDED 1 Bottle 4     calcium carbonate-vitamin D 500-400 MG-UNIT TABS tablt ONE BY MOUTH TWICE A  tablet 3     furosemide (LASIX) 20 MG tablet TAKE 2 TABLETS BY MOUTH ONCE DAILY 180 tablet 3     losartan (COZAAR) 100 MG tablet Take 1 tablet (100 mg) by mouth daily 90 tablet 3     metFORMIN (GLUCOPHAGE) 500 MG tablet Take 1 tablet (500 mg) by mouth daily (with breakfast) 90 tablet 3     Probiotic Product (PROBIOTIC PO)        simvastatin (ZOCOR) 20 MG tablet TAKE 1/2 TABLET BY MOUTH NIGHTLY AT BEDTIME 45 tablet 3       Allergies   Allergen Reactions     Septra [Bactrim]      Sulfa Drugs Rash       REVIEW OF SYSTEMS:  CONSTITUTIONAL:  NEGATIVE for fever, chills, change in weight  INTEGUMENTARY/SKIN:  NEGATIVE for worrisome rashes, moles or lesions  EYES:  NEGATIVE for vision changes or irritation  ENT/MOUTH:  NEGATIVE for ear, mouth and throat problems  RESP:  NEGATIVE for significant cough or SOB  BREAST:  NEGATIVE for masses, tenderness or discharge  CV:  NEGATIVE for chest pain, palpitations or peripheral edema  GI:  NEGATIVE for nausea, abdominal pain, heartburn, or change in bowel habits  :  Negative   MUSCULOSKELETAL:  See HPI above  NEURO:  NEGATIVE for weakness, dizziness or paresthesias  ENDOCRINE:  NEGATIVE for temperature intolerance, skin/hair changes  HEME/ALLERGY/IMMUNE:  NEGATIVE for bleeding problems  PSYCHIATRIC:  NEGATIVE for changes in mood or affect         Xray today shows good position of components.  No sign of loosening or wear.     Exam:    Vitals: /66  Pulse 81  Resp 13  Ht 1.524 m (5')  Wt 65.8 kg (145 lb)  BMI 28.32 kg/m2  BMI= Body mass index is 28.32 kg/(m^2)..  Range of motion:   Left Flexion:  160 degrees.  Right  flexion:  145 degrees.  Left external rotation 70 degrees, internal rotation T8 degrees.  No pain.  Right external rotation 70 degrees, internal rotation S1 degrees.  No pain.  Strength:   External rotation:  4/5  Internal rotation: 5/5  Abduction: 4/5    Sensation, motor, and circulation intact.  Well healed scar.  No erythema or increased warmth.      Assessment:  right reverse total shoulder arthroplasty doing well.    Plan:  Activity as tolerated.  Take antibiotics before dental work.  Return to clinic 2-4 years with xray  right shoulder .

## 2018-07-23 NOTE — PATIENT INSTRUCTIONS
Activity as tolerated. Lifting to 15 pounds.  Take antibiotics before dental work.  Return to clinic 2-4 years with xray  right shoulder .

## 2018-08-16 ENCOUNTER — MYC MEDICAL ADVICE (OUTPATIENT)
Dept: FAMILY MEDICINE | Facility: CLINIC | Age: 83
End: 2018-08-16

## 2018-08-16 DIAGNOSIS — E11.9 TYPE 2 DIABETES MELLITUS WITHOUT COMPLICATION, WITHOUT LONG-TERM CURRENT USE OF INSULIN (H): Primary | ICD-10-CM

## 2018-08-20 ENCOUNTER — TELEPHONE (OUTPATIENT)
Dept: FAMILY MEDICINE | Facility: CLINIC | Age: 83
End: 2018-08-20

## 2018-08-20 NOTE — TELEPHONE ENCOUNTER
Attempt # 1  Called patient at home [phone]  Was call answered? yes, left message with  to have patient call nurse line [phone]    Brielle Pedroza RN  [facility]

## 2018-08-20 NOTE — TELEPHONE ENCOUNTER
Reason for Call:  Same Day Appointment, Requested Provider:  Satish Pena MD    PCP: Satish Pena    Reason for visit: Pain in left buttocks that radiates down her left leg to ankle     Duration of symptoms: 3-4 days    Have you been treated for this in the past? No    Additional comments: Patient asked if Dr. Pena could work her in anytime today or Tuesday, or Wednesday morning. She is going out of town Wednesday afternoon. Please call patient back to discuss.    Can we leave a detailed message on this number? YES    Phone number patient can be reached at: Home number on file [phone] (home)    Best Time: Anytime    Call taken on 8/20/2018 at 7:23 AM by Jayshree Thomas

## 2018-08-20 NOTE — TELEPHONE ENCOUNTER
Patient called backed - 2 weeks ago, all of a sudden it hurt when sat and get up, fine when laying in bed, noticed it when got out of bed. Rates pain at 2/10, pain stops when stands after sitting, shoots down to ankle bone. Left leg, starts at top middle of buttock and aches down leg to ankle, no redness or heat on lower leg, has been taking furosemide bid last few days and ankles are slim, no flu like symptoms. No new shoes recently, no injury.     Patient would like to see Dr. Pena but did schedule with Hailey Aranda for Tuesday morning.    Brielle Pedroza RN  [facility]

## 2018-08-21 ENCOUNTER — OFFICE VISIT (OUTPATIENT)
Dept: FAMILY MEDICINE | Facility: CLINIC | Age: 83
End: 2018-08-21
Payer: MEDICARE

## 2018-08-21 VITALS
WEIGHT: 150 LBS | DIASTOLIC BLOOD PRESSURE: 60 MMHG | OXYGEN SATURATION: 98 % | BODY MASS INDEX: 29.29 KG/M2 | TEMPERATURE: 97.7 F | SYSTOLIC BLOOD PRESSURE: 146 MMHG | HEART RATE: 76 BPM

## 2018-08-21 DIAGNOSIS — M79.18 LEFT BUTTOCK PAIN: Primary | ICD-10-CM

## 2018-08-21 DIAGNOSIS — I10 HTN, GOAL BELOW 150/90: ICD-10-CM

## 2018-08-21 DIAGNOSIS — M25.572 ACUTE LEFT ANKLE PAIN: ICD-10-CM

## 2018-08-21 PROCEDURE — 99213 OFFICE O/P EST LOW 20 MIN: CPT | Performed by: NURSE PRACTITIONER

## 2018-08-21 RX ORDER — METHYLPREDNISOLONE 4 MG
TABLET, DOSE PACK ORAL
Qty: 21 TABLET | Refills: 0 | Status: SHIPPED | OUTPATIENT
Start: 2018-08-21 | End: 2018-09-11

## 2018-08-21 ASSESSMENT — PAIN SCALES - GENERAL: PAINLEVEL: MILD PAIN (3)

## 2018-08-21 NOTE — PATIENT INSTRUCTIONS
"1) Apply diclofenac gel to left buttocks/low back four times daily  2) Take tylenol 1,000 mg every 8 hours as needed for pain    If the pain is not improving with the above medications you can try ibuprofen 600 mg every 6 hours or another NSAID that you can buy over the counter    If the pain is still not improving, fill the prednisone prescription    If you continue to have [place] and I will refer you to physical therapy    * Sciatica    Sciatica (\"Lumbar Radiculopathy\") causes a pain that spreads from the lower back down into the buttock, hip and leg. Sometimes leg pain can occur without any back pain. Sciatica is due to irritation or pressure on a spinal nerve as it comes out of the spinal canal. This is most often due to a bulge or rupture of a nearby spinal disk (the cartilage cushion between each spinal bone), which presses on a nearby nerve. Other causes include spinal stenosis (narrowing of the spinal canal) and spasm of the piriformis muscle (a muscle in the buttocks that the sciatic nerve passes through).  Sciatica may begin after a sudden twisting/bending force (such as in a car accident), or sometimes after a simple awkward movement. In either case, muscle spasm is commonly present and contributes to the pain.  The diagnosis of sciatica is made from the symptoms and physical exam. Unless you had a physical injury (such as a car accident or fall), X-rays are usually not ordered for the initial evaluation of sciatica because the nerves and disks cannot be seen on an X-ray. Most sciatica (80-90%) gets better with time.  What can I do about my low back pain?  There are three main things you can do to ease low back pain and help it go away.    Use heat or cold packs.    Take medicine as directed.    Use positions, movements and exercises. Stay active! Too much rest can make your symptoms worse.  Using heat or cold packs  Try cold packs or gentle heat to ease your pain. " Use whichever gives the most relief. Apply the cold pack or heat for 15 minutes at a time, as often as needed.  Taking medicine  If taking over-the-counter medicine:    Take ibuprofen (Advil, Motrin) 600 mg. three times a day as needed for pain.  OR  ? Take Aleve (naproxen sodium) 220 to 440 mg. two times a day as needed for pain  If your doctor prescribed a muscle relaxant (cyclobenzapine 10 mg.):    Take one half ( ) to 1 tablet at bedtime    Do not drive when taking this medicine. This drug may make you sleepy.  Using positions, movements and exercises  Research tells us that moving your joints and muscles can help you recover from back pain. Such activity should be simple and gentle.  Use the positions below as well as walking to help relieve your discomfort. Try taking a short walk every 3 to 4 hours during the day. Walk for a few minutes inside your home or take longer walks outside, on a treadmill or at a mall. Slowly increase the amount of time you walk. Expect discomfort when you begin, but it should lessen as your back starts to recover.  Finding a position that is comfortable  When your back pain is new, you may find that certain positions will ease your pain. Gently try each of the following positions until you find one that eases your pain. Once you find a position of comfort, use it as often as you like while you recover. Return to your daily routine as soon as possible.     Lie on your back with your legs bent. You can do this by placing a pillow under your knees or lie on the floor and rest your lower legs on the seat of a chair.    Lie on your side with your knees bent and place a pillow between your knees.    Lie on your stomach over pillows.  When should I call my doctor?  Your back pain should improve over the first couple of weeks. As it improves, you should be able to return to your normal activities. But call your doctor if:    You have a sudden change in your ability to control? your bladder  or bowels.    You begin to feel tingling in your groin or legs.    The pain spreads down your leg and into your foot.    Your toes, feet or leg muscles begin to feel weak.    You feel generally unwell or sick.    Your pain gets worse.    5248-6114 [employer]. [address] [place] [address]. All rights reserved. This information is not intended as a substitute for professional medical care. Always follow your healthcare professional's instructions.  This information has been modified by your health care provider with permission from the publisher.

## 2018-08-21 NOTE — MR AVS SNAPSHOT
"              After Visit Summary   8/21/2018    Pam Hartman    MRN: [id number]           Patient Information     Date Of Birth          1935        Visit Information        Provider Department      8/21/2018 11:20 AM Hailey Aranda APRN [facility]        Today's Diagnoses     Left buttock pain    -  1    HTN, goal below 150/90          Care Instructions    1) Apply diclofenac gel to left buttocks/low back four times daily  2) Take tylenol 1,000 mg every 8 hours as needed for pain    If the pain is not improving with the above medications you can try ibuprofen 600 mg every 6 hours or another NSAID that you can buy over the counter    If the pain is still not improving, fill the prednisone prescription    If you continue to have pain when you return from [facility] let me know and I will refer you to physical therapy    * Sciatica    Sciatica (\"Lumbar Radiculopathy\") causes a pain that spreads from the lower back down into the buttock, hip and leg. Sometimes leg pain can occur without any back pain. Sciatica is due to irritation or pressure on a spinal nerve as it comes out of the spinal canal. This is most often due to a bulge or rupture of a nearby spinal disk (the cartilage cushion between each spinal bone), which presses on a nearby nerve. Other causes include spinal stenosis (narrowing of the spinal canal) and spasm of the piriformis muscle (a muscle in the buttocks that the sciatic nerve passes through).  Sciatica may begin after a sudden twisting/bending force (such as in a car accident), or sometimes after a simple awkward movement. In either case, muscle spasm is commonly present and contributes to the pain.  The diagnosis of sciatica is made from the symptoms and physical exam. Unless you had a physical injury (such as a car accident or fall), X-rays are usually not ordered for the initial evaluation of sciatica because the nerves and disks cannot be seen on an " X-ray. Most sciatica (80-90%) gets better with time.  What can I do about my low back pain?  There are three main things you can do to ease low back pain and help it go away.    Use heat or cold packs.    Take medicine as directed.    Use positions, movements and exercises. Stay active! Too much rest can make your symptoms worse.  Using heat or cold packs  Try cold packs or gentle heat to ease your pain. Use whichever gives the most relief. Apply the cold pack or heat for 15 minutes at a time, as often as needed.  Taking medicine  If taking over-the-counter medicine:    Take ibuprofen (Advil, Motrin) 600 mg. three times a day as needed for pain.  OR  ? Take Aleve (naproxen sodium) 220 to 440 mg. two times a day as needed for pain  If your doctor prescribed a muscle relaxant (cyclobenzapine 10 mg.):    Take one half ( ) to 1 tablet at bedtime    Do not drive when taking this medicine. This drug may make you sleepy.  Using positions, movements and exercises  Research tells us that moving your joints and muscles can help you recover from back pain. Such activity should be simple and gentle.  Use the positions below as well as walking to help relieve your discomfort. Try taking a short walk every 3 to 4 hours during the day. Walk for a few minutes inside your home or take longer walks outside, on a treadmill or at a mall. Slowly increase the amount of time you walk. Expect discomfort when you begin, but it should lessen as your back starts to recover.  Finding a position that is comfortable  When your back pain is new, you may find that certain positions will ease your pain. Gently try each of the following positions until you find one that eases your pain. Once you find a position of comfort, use it as often as you like while you recover. Return to your daily routine as soon as possible.     Lie on your back with your legs bent. You can do this by placing a pillow under your knees or lie on the floor and rest your  lower legs on the seat of a chair.    Lie on your side with your knees bent and place a pillow between your knees.    Lie on your stomach over pillows.  When should I call my doctor?  Your back pain should improve over the first couple of weeks. As it improves, you should be able to return to your normal activities. But call your doctor if:    You have a sudden change in your ability to control? your bladder or bowels.    You begin to feel tingling in your groin or legs.    The pain spreads down your leg and into your foot.    Your toes, feet or leg muscles begin to feel weak.    You feel generally unwell or sick.    Your pain gets worse.    6674-8558 [employer]. [address] [place] [address], [place] [address]. All rights reserved. This information is not intended as a substitute for professional medical care. Always follow your healthcare professional's instructions.  This information has been modified by your health care provider with permission from the publisher.                Follow-ups after your visit        Your next 10 appointments already scheduled     Oct 01, 2018  9:30 AM CDT   LAB with [facility]   [facility]    [address] [place] [id number]   [phone]           Please do not eat 10-12 hours before your appointment if you are coming in fasting for labs on lipids, cholesterol, or glucose (sugar). This does not apply to pregnant women. Water, hot tea and black coffee (with nothing added) are okay. Do not drink other fluids, diet soda or chew gum.            Oct 08, 2018 10:20 AM CDT   SHORT with Satish Pena MD   [facility]    [address] [place] [id number]   [phone]              Who to contact     If you have questions or need follow up information about today's clinic visit or your schedule please contact  [facility] directly at [phone].  Normal or non-critical lab and imaging results will be communicated to you by MyChart, letter or phone within 4 business days after the clinic has received the results. If you do not hear from us within 7 days, please contact the clinic through [employer] or phone. If you have a critical or abnormal lab result, we will notify you by phone as soon as possible.  Submit refill requests through [employer] or call your pharmacy and they will forward the refill request to us. Please allow 3 business days for your refill to be completed.          Additional Information About Your Visit        [employer] Information     [employer] gives you secure access to your electronic health record. If you see a primary care provider, you can also send messages to your care team and make appointments. If you have questions, please call your primary care clinic.  If you do not have a primary care provider, please call [phone] and they will assist you.        Care EveryWhere ID     This is your Care EveryWhere ID. This could be used by other organizations to access your [place] medical records  [id number]        Your Vitals Were     Pulse Temperature Pulse Oximetry Breastfeeding? BMI (Body Mass Index)       76 97.7  F (36.5  C) (Oral) 98% No 29.29 kg/m2        Blood Pressure from Last 3 Encounters:   08/21/18 146/60   07/23/18 124/66   04/25/18 179/80    Weight from Last 3 Encounters:   08/21/18 150 lb (68 kg)   07/23/18 145 lb (65.8 kg)   04/25/18 147 lb (66.7 kg)              Today, you had the following     No orders found for display         Today's Medication Changes          These changes are accurate as of 8/21/18 11:55 AM.  If you have any questions, ask your nurse or doctor.               Start taking these medicines.        Dose/Directions    diclofenac 1 % Gel topical gel   Commonly known as:  VOLTAREN   Used for:  Left buttock pain   Started by:  Hailey Aranda,  APRN CNP        Apply 4 grams to left buttocks four times daily using enclosed dosing card.   Quantity:  100 g   Refills:  1       methylPREDNISolone 4 MG tablet   Commonly known as:  MEDROL DOSEPAK   Used for:  Left buttock pain   Started by:  Hailey Aranda APRN CNP        Follow package instructions   Quantity:  21 tablet   Refills:  0         These medicines have changed or have updated prescriptions.        Dose/Directions    amLODIPine 10 MG tablet   Commonly known as:  NORVASC   This may have changed:  Another medication with the same name was removed. Continue taking this medication, and follow the directions you see here.   Used for:  Hyperlipidemia LDL goal <100   Changed by:  Hailey Aranda APRN CNP        Dose:  10 mg   Take 1 tablet (10 mg) by mouth daily   Quantity:  90 tablet   Refills:  3            Where to get your medicines      These medications were sent to [place] [address] [facility] - [facility], [place] - [address]  [address] [facility] [place] [address]     Phone:  [phone]     diclofenac 1 % Gel topical gel         Some of these will need a paper prescription and others can be bought over the counter.  Ask your nurse if you have questions.     Bring a paper prescription for each of these medications     methylPREDNISolone 4 MG tablet                Primary Care Provider Office Phone # Fax #    Satish Pena [phone]       [address] [facility] [address]        Equal Access to Services     ANTHONY OSUNA : Hadclarice englando Sotrenton, waaxda luqadaha, qaybta kaalmada adeprinceyada, bev rao. So [facility] [phone].    ATENCIÓN: Si habla español, tiene a armstrong disposición servicios gratuitos de asistencia lingüística. Miguelina olivera [phone].    We comply with applicable federal civil rights laws and [place] laws. We do not discriminate on the basis of race, color, national origin, age,  disability, sex, sexual orientation, or gender identity.            Thank you!     Thank you for choosing [facility]  for your care. Our goal is always to provide you with excellent care. Hearing back from our patients is one way we can continue to improve our services. Please take a few minutes to complete the written survey that you may receive in the mail after your visit with us. Thank you!             Your Updated Medication List - Protect others around you: Learn how to safely use, store and throw away your medicines at www.disposemymeds.org.          This list is accurate as of 8/21/18 11:55 AM.  Always use your most recent med list.                   Brand Name Dispense Instructions for use Diagnosis    amLODIPine 10 MG tablet    NORVASC    90 tablet    Take 1 tablet (10 mg) by mouth daily    Hyperlipidemia LDL goal <100       aspirin 81 MG tablet      Take 1 tablet by mouth daily.        blood glucose monitoring meter device kit    no brand specified    1 kit    Use to test blood sugar 2 times daily or as directed.    Glucose intolerance (impaired glucose tolerance), Angioedema, initial encounter       blood glucose monitoring test strip    no brand specified    100 each    Use to test blood sugars 2 times daily or as directed    Angioedema, initial encounter, Glucose intolerance (impaired glucose tolerance)       calcipotriene 0.005 % Soln solution    DOVONEX    1 Bottle    Apply  topically. APPLY bid AS NEEDED    Psoriasis       calcium carbonate-vitamin D 500-400 MG-UNIT Tabs per tablet     180 tablet    ONE BY MOUTH TWICE A DAY    Osteopenia, Type 2 diabetes mellitus without complication (H), Hyperlipidemia LDL goal <100, Special screening for malignant neoplasms, colon, Need for vaccination for Strep pneumoniae       diclofenac 1 % Gel topical gel    VOLTAREN    100 g    Apply 4 grams to left buttocks four times daily using enclosed dosing card.    Left buttock pain        furosemide 20 MG tablet    LASIX    180 tablet    TAKE 2 TABLETS BY MOUTH ONCE DAILY    Generalized edema       losartan 100 MG tablet    COZAAR    90 tablet    Take 1 tablet (100 mg) by mouth daily    Essential hypertension with goal blood pressure less than 140/90       metFORMIN 500 MG tablet    GLUCOPHAGE    90 tablet    Take 1 tablet (500 mg) by mouth daily (with breakfast)    Type 2 diabetes mellitus without complication, without long-term current use of insulin (H)       methylPREDNISolone 4 MG tablet    MEDROL DOSEPAK    21 tablet    Follow package instructions    Left buttock pain       PROBIOTIC PO           simvastatin 20 MG tablet    ZOCOR    45 tablet    TAKE 1/2 TABLET BY MOUTH NIGHTLY AT BEDTIME    Type 2 diabetes mellitus without complication, without long-term current use of insulin (H), Hyperlipidemia LDL goal <100       TYLENOL PO      Take 2,000 mg by mouth daily    Acute maxillary sinusitis, Right shoulder injury, initial encounter

## 2018-08-21 NOTE — PROGRESS NOTES
SUBJECTIVE:   Pam Hartman is a 83 year old female who presents to clinic today for the following health issues:      Joint Pain    Onset: 2 weeks    Description:   Location: Left leg and buttock  Character: Gnawing    Intensity: mild    Progression of Symptoms: same    Accompanying Signs & Symptoms:  Other symptoms: none    History:   Previous similar pain: no       Precipitating factors:   Trauma or overuse: no     Alleviating factors:  Improved by: nothing    Therapies Tried and outcome: Heating pad helped just a little.    Developed pain in left buttock 2 weeks ago  Relieved with standing  Pain radiates down left leg  Also left lateral ankle pain  Unsure if radiating pain  Does have some bilateral edema  She increased furosemide to 40 mg daily 3 days ago  Has not tried anything for pain        Problem list and histories reviewed & adjusted, as indicated.  Additional history: none    Patient Active Problem List   Diagnosis     HYPERLIPIDEMIA LDL GOAL <100     Psoriasis     IBS (irritable bowel syndrome)     Health Care Home     ASCUS on Pap smear     Advanced directives, counseling/discussion     Type 2 diabetes, HbA1C goal < 8% (H)     Rotator cuff tear arthropathy     Chronic maxillary sinusitis     HTN, goal below 150/90     Type 2 diabetes mellitus without complication (H)     Past Surgical History:   Procedure Laterality Date     EYE SURGERY  7/2015    Left and right cataract repair     REVERSE ARTHROPLASTY SHOULDER Right 07/19/2017    Nahun Skinner MD at [facility]     SEPTOPLASTY  1/16/2014    Procedure: SEPTOPLASTY;  Septoplasty, Left ethmoidectomy, Left endoscopic maxillary antrostomy, right kike bullosa;  Surgeon: Sarah Garcia MD;  Location: [facility] OR     TONSILLECTOMY & ADENOIDECTOMY         Social History   Substance Use Topics     Smoking status: Former Smoker     Quit date: 11/30/1984     Smokeless tobacco: Never Used     Alcohol use Yes      Comment: 2 DRINKS PER MONTH     Family  History   Problem Relation Age of Onset     Cerebrovascular Disease Father            Reviewed and updated as needed this visit by clinical staff  Tobacco  Allergies  Meds  Med Hx  Surg Hx  Fam Hx  Soc Hx      Reviewed and updated as needed this visit by Provider         ROS:  Constitutional, HEENT, cardiovascular, pulmonary, gi and gu systems are negative, except as otherwise noted.    OBJECTIVE:     /60  Pulse 76  Temp 97.7  F (36.5  C) (Oral)  Wt 150 lb (68 kg)  SpO2 98%  Breastfeeding? No  BMI 29.29 kg/m2  Body mass index is 29.29 kg/(m^2).  GENERAL: healthy, alert and no distress  MS: Gait appropriate. Able to sit to stand. Some tenderness over left SI joint. No tenderness lumbar spin, greater trochanter. Flexion and abduction left hip intact. No tenderness lateral or medial malleolus. Moderate non pitting bilateral ankle edema. Plantar/dorsiflexion intact and without pain  SKIN: no suspicious lesions or rashes  NEURO: Normal strength and tone, mentation intact and speech normal    Diagnostic Test Results:  none     ASSESSMENT/PLAN:       ICD-10-CM    1. Left buttock pain M79.1 methylPREDNISolone (MEDROL DOSEPAK) 4 MG tablet     diclofenac (VOLTAREN) 1 % GEL topical gel   2. HTN, goal below 150/90 I10    3. Acute left ankle pain M25.572      She has some tenderness over left SI joint with radiating symptoms consistent with sciatica  May be arthritis sacroiliac joint. Advised that x-ray will not change treatment plan so it is not indicated  Unsure if ankle pain r/t sciatica (she can't pinpoint if it is radiating pain) or tendonitis or arthritis  Physical exam unremarkable. No unilateral swelling, tenderness or pain with ambulation to suggest DVT  Will monitor  Will treat with tylenol, topical diclofenac. If unrelieved with try ibuprofen  She is leaving town for 2 weeks  If symptoms don't improve she will fill Rx for Medrol Dosepak which was printed and given to her        CHINEDU Barrera  [facility]

## 2018-08-22 ENCOUNTER — TELEPHONE (OUTPATIENT)
Dept: FAMILY MEDICINE | Facility: CLINIC | Age: 83
End: 2018-08-22

## 2018-08-22 NOTE — TELEPHONE ENCOUNTER
Prior Authorization Retail Medication Request    Medication/Dose: DICLOFENAC SODIUM 1% GEL   ICD code (if different than what is on RX):    Previously Tried and Failed:    Rationale:      Insurance Name:  [phone]   CODE  [id number]  Insurance ID:        Pharmacy Information (if different than what is on RX)  Name: [facility]   Phone:  [phone]

## 2018-08-22 NOTE — LETTER
[address] [address] [phone]    Pam Hartman  1935     I appeal to reconsider the decision on diclofenac gel for Pam Hartman.  Ms. Hartman has treated osteoarthritis successfully with this medication.  Oral NSAIDS cause gastritis and IBS symptoms for patient.  Tylenol has not been effective.  She does not want opiate medications and other pain options have increased side effects.  This is an effective treatment for her with minimal side effects.           ANDREW SHAH MD       September 11, 2018

## 2018-08-28 NOTE — TELEPHONE ENCOUNTER
Called and spoke with Norma at [facility], the PA was denied.  Asked to have another fax sent since one has not been received.

## 2018-08-30 NOTE — TELEPHONE ENCOUNTER
PRIOR AUTHORIZATION DENIED    Medication: DICLOFENAC SODIUM 1% GEL -DENIED    Denial Date: 8/30/2018    Denial Rational: Patient does not have an FDA medically-accepted indication      Appeal Information:

## 2018-08-30 NOTE — TELEPHONE ENCOUNTER
Called and spoke with Pam, asked to have another denial fax sent over since we still have not received.

## 2018-08-31 NOTE — TELEPHONE ENCOUNTER
PA denied. See below for rationale. Would you like to appeal? If so please write a letter of medical necessity.  Bina See BETY Watkins

## 2018-09-14 NOTE — TELEPHONE ENCOUNTER
Sheila from [facility] calling to say that the appeal was denied. She is faxing a denial letter as well.    Thanks!  Angel Dickey

## 2018-10-01 DIAGNOSIS — E11.9 TYPE 2 DIABETES MELLITUS WITHOUT COMPLICATION, WITHOUT LONG-TERM CURRENT USE OF INSULIN (H): ICD-10-CM

## 2018-10-01 DIAGNOSIS — L40.9 PSORIASIS: ICD-10-CM

## 2018-10-01 LAB
BASOPHILS # BLD AUTO: 0.1 10E9/L (ref 0–0.2)
BASOPHILS NFR BLD AUTO: 0.8 %
DIFFERENTIAL METHOD BLD: NORMAL
EOSINOPHIL # BLD AUTO: 0.2 10E9/L (ref 0–0.7)
EOSINOPHIL NFR BLD AUTO: 2.8 %
ERYTHROCYTE [DISTWIDTH] IN BLOOD BY AUTOMATED COUNT: 12.8 % (ref 10–15)
HBA1C MFR BLD: 7.7 % (ref 0–5.6)
HCT VFR BLD AUTO: 37.8 % (ref 35–47)
HGB BLD-MCNC: 13.2 G/DL (ref 11.7–15.7)
LYMPHOCYTES # BLD AUTO: 2 10E9/L (ref 0.8–5.3)
LYMPHOCYTES NFR BLD AUTO: 31.3 %
MCH RBC QN AUTO: 31.7 PG (ref 26.5–33)
MCHC RBC AUTO-ENTMCNC: 34.9 G/DL (ref 31.5–36.5)
MCV RBC AUTO: 91 FL (ref 78–100)
MONOCYTES # BLD AUTO: 0.6 10E9/L (ref 0–1.3)
MONOCYTES NFR BLD AUTO: 10.1 %
NEUTROPHILS # BLD AUTO: 3.5 10E9/L (ref 1.6–8.3)
NEUTROPHILS NFR BLD AUTO: 55 %
PLATELET # BLD AUTO: 258 10E9/L (ref 150–450)
RBC # BLD AUTO: 4.16 10E12/L (ref 3.8–5.2)
TSH SERPL DL<=0.005 MIU/L-ACNC: 1.98 MU/L (ref 0.4–4)
WBC # BLD AUTO: 6.3 10E9/L (ref 4–11)

## 2018-10-01 PROCEDURE — 83036 HEMOGLOBIN GLYCOSYLATED A1C: CPT | Performed by: INTERNAL MEDICINE

## 2018-10-01 PROCEDURE — 36415 COLL VENOUS BLD VENIPUNCTURE: CPT | Performed by: INTERNAL MEDICINE

## 2018-10-01 PROCEDURE — 84443 ASSAY THYROID STIM HORMONE: CPT | Performed by: INTERNAL MEDICINE

## 2018-10-01 PROCEDURE — 85025 COMPLETE CBC W/AUTO DIFF WBC: CPT | Performed by: INTERNAL MEDICINE

## 2018-10-08 ENCOUNTER — OFFICE VISIT (OUTPATIENT)
Dept: FAMILY MEDICINE | Facility: CLINIC | Age: 83
End: 2018-10-08
Payer: MEDICARE

## 2018-10-08 VITALS
SYSTOLIC BLOOD PRESSURE: 150 MMHG | OXYGEN SATURATION: 98 % | WEIGHT: 151 LBS | HEART RATE: 74 BPM | BODY MASS INDEX: 29.49 KG/M2 | TEMPERATURE: 97.8 F | DIASTOLIC BLOOD PRESSURE: 82 MMHG

## 2018-10-08 DIAGNOSIS — Z23 NEED FOR PROPHYLACTIC VACCINATION AND INOCULATION AGAINST INFLUENZA: ICD-10-CM

## 2018-10-08 DIAGNOSIS — L40.9 PSORIASIS: ICD-10-CM

## 2018-10-08 DIAGNOSIS — K58.0 IRRITABLE BOWEL SYNDROME WITH DIARRHEA: Primary | ICD-10-CM

## 2018-10-08 PROCEDURE — G0008 ADMIN INFLUENZA VIRUS VAC: HCPCS | Performed by: INTERNAL MEDICINE

## 2018-10-08 PROCEDURE — 90662 IIV NO PRSV INCREASED AG IM: CPT | Performed by: INTERNAL MEDICINE

## 2018-10-08 PROCEDURE — 99214 OFFICE O/P EST MOD 30 MIN: CPT | Mod: 25 | Performed by: INTERNAL MEDICINE

## 2018-10-08 RX ORDER — DICYCLOMINE HCL 20 MG
20 TABLET ORAL 4 TIMES DAILY PRN
Qty: 40 TABLET | Refills: 1 | Status: SHIPPED | OUTPATIENT
Start: 2018-10-08 | End: 2019-07-12

## 2018-10-08 RX ORDER — CALCIPOTRIENE 0.05 MG/ML
SOLUTION TOPICAL
Qty: 1 BOTTLE | Refills: 4 | Status: SHIPPED | OUTPATIENT
Start: 2018-10-08

## 2018-10-08 ASSESSMENT — ANXIETY QUESTIONNAIRES
6. BECOMING EASILY ANNOYED OR IRRITABLE: NOT AT ALL
3. WORRYING TOO MUCH ABOUT DIFFERENT THINGS: NOT AT ALL
IF YOU CHECKED OFF ANY PROBLEMS ON THIS QUESTIONNAIRE, HOW DIFFICULT HAVE THESE PROBLEMS MADE IT FOR YOU TO DO YOUR WORK, TAKE CARE OF THINGS AT HOME, OR GET ALONG WITH OTHER PEOPLE: NOT DIFFICULT AT ALL
5. BEING SO RESTLESS THAT IT IS HARD TO SIT STILL: NOT AT ALL
GAD7 TOTAL SCORE: 0
2. NOT BEING ABLE TO STOP OR CONTROL WORRYING: NOT AT ALL
1. FEELING NERVOUS, ANXIOUS, OR ON EDGE: NOT AT ALL
7. FEELING AFRAID AS IF SOMETHING AWFUL MIGHT HAPPEN: NOT AT ALL

## 2018-10-08 ASSESSMENT — PATIENT HEALTH QUESTIONNAIRE - PHQ9: 5. POOR APPETITE OR OVEREATING: NOT AT ALL

## 2018-10-08 ASSESSMENT — PAIN SCALES - GENERAL: PAINLEVEL: NO PAIN (0)

## 2018-10-08 NOTE — PROGRESS NOTES

## 2018-10-08 NOTE — PROGRESS NOTES
SUBJECTIVE:   Pam Hartman is a 83 year old female who presents to clinic today for the following health issues:    Diabetes Follow-up    Patient is checking blood sugars: once daily.  Results are as follows:         am - 140-200's    Diabetic concerns: blood sugar frequently over 200     Symptoms of hypoglycemia (low blood sugar): none     Paresthesias (numbness or burning in feet) or sores: No     Date of last diabetic eye exam: 1/15/18    Sciatica diclofenac gel  If not working needs prednisone  Filled  Made difference.  2 days later came back  Another month.  2-3 weeks.       Sometimes uncontrolled diarrhea.  Metformin a few years.  Probiotic. ( had previously)  Lactose free milk.  Sudden onset of diarrhea and 2 evacuations      BP Readings from Last 2 Encounters:   08/21/18 146/60   07/23/18 124/66     Hemoglobin A1C (%)   Date Value   10/01/2018 7.7 (H)   04/18/2018 6.9 (H)     LDL Cholesterol Calculated (mg/dL)   Date Value   04/18/2018 66   07/11/2016 69       Diabetes Management Resources    Amount of exercise or physical activity: None    Problems taking medications regularly: No    Medication side effects: none    Diet: regular (no restrictions) and low salt    Recheck SI pain         Problem list and histories reviewed & adjusted, as indicated.  Additional history: as documented    Patient Active Problem List   Diagnosis     HYPERLIPIDEMIA LDL GOAL <100     Psoriasis     IBS (irritable bowel syndrome)     Health Care Home     ASCUS on Pap smear     Advanced directives, counseling/discussion     Type 2 diabetes, HbA1C goal < 8% (H)     Rotator cuff tear arthropathy     Chronic maxillary sinusitis     HTN, goal below 150/90     Type 2 diabetes mellitus without complication (H)     Past Surgical History:   Procedure Laterality Date     EYE SURGERY  7/2015    Left and right cataract repair     REVERSE ARTHROPLASTY SHOULDER Right 07/19/2017    Nahun Skinner MD at [facility]     SEPTOPLASTY   1/16/2014    Procedure: SEPTOPLASTY;  Septoplasty, Left ethmoidectomy, Left endoscopic maxillary antrostomy, right kike bullosa;  Surgeon: Sarah Garcia MD;  Location: [facility] OR     TONSILLECTOMY & ADENOIDECTOMY         Social History   Substance Use Topics     Smoking status: Former Smoker     Quit date: 11/30/1984     Smokeless tobacco: Never Used     Alcohol use Yes      Comment: 2 DRINKS PER MONTH     Family History   Problem Relation Age of Onset     Cerebrovascular Disease Father          Current Outpatient Prescriptions   Medication Sig Dispense Refill     Acetaminophen (TYLENOL PO) Take 2,000 mg by mouth daily        amLODIPine (NORVASC) 10 MG tablet Take 1 tablet (10 mg) by mouth daily 90 tablet 3     aspirin 81 MG tablet Take 1 tablet by mouth daily.       blood glucose monitoring (NO BRAND SPECIFIED) meter device kit Use to test blood sugar 2 times daily or as directed. 1 kit 0     blood glucose monitoring (NO BRAND SPECIFIED) test strip Use to test blood sugars 2 times daily or as directed 100 each 12     calcipotriene (DOVONEX) 0.005 % SOLN solution Apply to scalp BID as needed 1 Bottle 4     calcium carbonate-vitamin D 500-400 MG-UNIT TABS tablt ONE BY MOUTH TWICE A  tablet 3     diclofenac (VOLTAREN) 1 % GEL topical gel Apply 4 grams to left buttocks four times daily using enclosed dosing card. 100 g 1     dicyclomine (BENTYL) 20 MG tablet Take 1 tablet (20 mg) by mouth 4 times daily as needed (diarrhea/ abd pain) 40 tablet 1     furosemide (LASIX) 20 MG tablet TAKE 2 TABLETS BY MOUTH ONCE DAILY 180 tablet 3     losartan (COZAAR) 100 MG tablet Take 1 tablet (100 mg) by mouth daily 90 tablet 3     metFORMIN (GLUCOPHAGE) 500 MG tablet Take 1 tablet (500 mg) by mouth daily (with breakfast) 90 tablet 3     Probiotic Product (PROBIOTIC PO)        simvastatin (ZOCOR) 20 MG tablet TAKE 1/2 TABLET BY MOUTH NIGHTLY AT BEDTIME 45 tablet 3     Allergies   Allergen Reactions     Septra [Bactrim]      Sulfa  Drugs Rash     Recent Labs   Lab Test  10/01/18   0922  04/18/18   1000  07/10/17   1023  11/28/16   0913  07/11/16   0935   07/24/15   0705   A1C  7.7*  6.9*   --   7.6*  7.6*   < >  8.0*   LDL   --   66   --    --   69   --   79   HDL   --   47*   --    --   46*   --   44*   TRIG   --   176*   --    --   175*   --   147   ALT   --   23   --    --   22   --   26   CR   --   0.98  0.91  1.12*  0.98   < >  0.84   GFRESTIMATED   --   54*  59*  47*  54*   < >  65   GFRESTBLACK   --   66  72  57*  66   < >  79   POTASSIUM   --   4.0  4.6  3.8  3.9   < >  3.4   TSH  1.98   --    --    --   1.41   --   1.76    < > = values in this interval not displayed.        Reviewed and updated as needed this visit by clinical staff  Tobacco  Meds  Med Hx  Surg Hx  Fam Hx  Soc Hx      Reviewed and updated as needed this visit by Provider         ROS:  Constitutional, HEENT, cardiovascular, pulmonary, gi and gu systems are negative, except as otherwise noted.    OBJECTIVE:     /82 (BP Location: Right arm, Patient Position: Chair, Cuff Size: Adult Regular)  Pulse 74  Temp 97.8  F (36.6  C) (Oral)  Wt 151 lb (68.5 kg)  SpO2 98%  Breastfeeding? No  BMI 29.49 kg/m2  Body mass index is 29.49 kg/(m^2).  GENERAL: healthy, alert and no distress  EYES: Eyes grossly normal to inspection, PERRL and conjunctivae and sclerae normal  HENT: ear canals and TM's normal, nose and mouth without ulcers or lesions  NECK: no adenopathy, no asymmetry, masses, or scars and thyroid normal to palpation  RESP: lungs clear to auscultation - no rales, rhonchi or wheezes  CV: regular rate and rhythm, normal S1 S2, no S3 or S4, no murmur, click or rub, no peripheral edema and peripheral pulses strong  ABDOMEN: soft, nontender, no hepatosplenomegaly, no masses and bowel sounds normal  MS: no gross musculoskeletal defects noted, no edema  SKIN: no suspicious lesions or rashes  NEURO: Normal strength and tone, mentation intact and speech normal  BACK:  no CVA tenderness, no paralumbar tenderness  PSYCH: mentation appears normal, affect normal/bright  LYMPH: no cervical, supraclavicular, axillary, or inguinal adenopathy    Diagnostic Test Results:  Results for orders placed or performed in visit on 10/01/18   **CBC with platelets differential FUTURE 2mo   Result Value Ref Range    WBC 6.3 4.0 - 11.0 10e9/L    RBC Count 4.16 3.8 - 5.2 10e12/L    Hemoglobin 13.2 11.7 - 15.7 g/dL    Hematocrit 37.8 35.0 - 47.0 %    MCV 91 78 - 100 fl    MCH 31.7 26.5 - 33.0 pg    MCHC 34.9 31.5 - 36.5 g/dL    RDW 12.8 10.0 - 15.0 %    Platelet Count 258 150 - 450 10e9/L    Diff Method Automated Method     % Neutrophils 55.0 %    % Lymphocytes 31.3 %    % Monocytes 10.1 %    % Eosinophils 2.8 %    % Basophils 0.8 %    Absolute Neutrophil 3.5 1.6 - 8.3 10e9/L    Absolute Lymphocytes 2.0 0.8 - 5.3 10e9/L    Absolute Monocytes 0.6 0.0 - 1.3 10e9/L    Absolute Eosinophils 0.2 0.0 - 0.7 10e9/L    Absolute Basophils 0.1 0.0 - 0.2 10e9/L   **A1C FUTURE anytime   Result Value Ref Range    Hemoglobin A1C 7.7 (H) 0 - 5.6 %   **TSH with free T4 reflex FUTURE anytime   Result Value Ref Range    TSH 1.98 0.40 - 4.00 mU/L       ASSESSMENT/PLAN:       ICD-10-CM    1. Irritable bowel syndrome with diarrhea K58.0 dicyclomine (BENTYL) 20 MG tablet   2. Psoriasis L40.9 calcipotriene (DOVONEX) 0.005 % SOLN solution     dicyclomine (BENTYL) 20 MG tablet   3. Need for prophylactic vaccination and inoculation against influenza Z23 FLU VACCINE, INCREASED ANTIGEN, PRESV FREE, AGE 65+ [21824]     Vaccine Administration, Initial [87098]           Satish Pena MD  [facility]

## 2018-10-08 NOTE — MR AVS SNAPSHOT
After Visit Summary   10/8/2018    Pam Hartman    MRN: [id number]           Patient Information     Date Of Birth          1935        Visit Information        Provider Department      10/8/2018 10:20 AM Satish Pena MD [facility]        Today's Diagnoses     Irritable bowel syndrome with diarrhea    -  1    Psoriasis           Follow-ups after your visit        Who to contact     If you have questions or need follow up information about today's clinic visit or your schedule please contact [facility] directly at [phone].  Normal or non-critical lab and imaging results will be communicated to you by [employer], letter or phone within 4 business days after the clinic has received the results. If you do not hear from us within 7 days, please contact the clinic through [employer] or phone. If you have a critical or abnormal lab result, we will notify you by phone as soon as possible.  Submit refill requests through [employer] or call your pharmacy and they will forward the refill request to us. Please allow 3 business days for your refill to be completed.          Additional Information About Your Visit        MyChart Information     [employer] [facility] gives you secure access to your electronic health record. If you see a primary care provider, you can also send messages to your care team and make appointments. If you have questions, please call your primary care clinic.  If you do not have a primary care provider, please call [phone] and they will assist you.        Care EveryWhere ID     This is your Care EveryWhere ID. This could be used by other organizations to access your [facility] medical records  [id number]        Your Vitals Were     Pulse Temperature Pulse Oximetry Breastfeeding? BMI (Body Mass Index)       74 97.8  F (36.6  C) (Oral) 98% No 29.49 kg/m2        Blood Pressure from Last 3 Encounters:   10/08/18 150/82   08/21/18 146/60   07/23/18  124/66    Weight from Last 3 Encounters:   10/08/18 151 lb (68.5 kg)   08/21/18 150 lb (68 kg)   07/23/18 145 lb (65.8 kg)              Today, you had the following     No orders found for display         Today's Medication Changes          These changes are accurate as of 10/8/18 11:14 AM.  If you have any questions, ask your nurse or doctor.               Start taking these medicines.        Dose/Directions    dicyclomine 20 MG tablet   Commonly known as:  BENTYL   Used for:  Psoriasis, Irritable bowel syndrome with diarrhea   Started by:  Satish Pena MD        Dose:  20 mg   Take 1 tablet (20 mg) by mouth 4 times daily as needed (diarrhea/ abd pain)   Quantity:  40 tablet   Refills:  1         These medicines have changed or have updated prescriptions.        Dose/Directions    calcipotriene 0.005 % Soln solution   Commonly known as:  DOVONEX   This may have changed:    - how to take this  - additional instructions   Used for:  Psoriasis   Changed by:  Satish Pena MD        Apply to scalp BID as needed   Quantity:  1 Bottle   Refills:  4            Where to get your medicines      These medications were sent to [facility] [id number] [facility] - [place], [place] - [address]  [address] [facility] [address]     Phone:  [phone]     calcipotriene 0.005 % Soln solution    dicyclomine 20 MG tablet                Primary Care Provider Office Phone # Fax #    Satish Pena [phone]       [address] [facility] [address]        Equal Access to Services     [facility] AH: Hadii aad ku hadasho Soomaali, waaxda luqadaha, qaybta kaalmada adeegyada, waxay idiin hayaan lyubov thompson'justino rao. So [facility] [phone].    ATENCIÓN: Si habla español, tiene a armstrong disposición servicios gratuitos de asistencia lingüística. Llame al [phone].    We comply with applicable federal civil rights laws and [place] laws. We do not discriminate on the basis of race,  color, national origin, age, disability, sex, sexual orientation, or gender identity.            Thank you!     Thank you for choosing [facility]  for your care. Our goal is always to provide you with excellent care. Hearing back from our patients is one way we can continue to improve our services. Please take a few minutes to complete the written survey that you may receive in the mail after your visit with us. Thank you!             Your Updated Medication List - Protect others around you: Learn how to safely use, store and throw away your medicines at www.disposemymeds.org.          This list is accurate as of 10/8/18 11:14 AM.  Always use your most recent med list.                   Brand Name Dispense Instructions for use Diagnosis    amLODIPine 10 MG tablet    NORVASC    90 tablet    Take 1 tablet (10 mg) by mouth daily    Hyperlipidemia LDL goal <100       aspirin 81 MG tablet      Take 1 tablet by mouth daily.        blood glucose monitoring meter device kit    no brand specified    1 kit    Use to test blood sugar 2 times daily or as directed.    Glucose intolerance (impaired glucose tolerance), Angioedema, initial encounter       blood glucose monitoring test strip    no brand specified    100 each    Use to test blood sugars 2 times daily or as directed    Angioedema, initial encounter, Glucose intolerance (impaired glucose tolerance)       calcipotriene 0.005 % Soln solution    DOVONEX    1 Bottle    Apply to scalp BID as needed    Psoriasis       calcium carbonate-vitamin D 500-400 MG-UNIT Tabs per tablet     180 tablet    ONE BY MOUTH TWICE A DAY    Osteopenia, Type 2 diabetes mellitus without complication (H), Hyperlipidemia LDL goal <100, Special screening for malignant neoplasms, colon, Need for vaccination for Strep pneumoniae       diclofenac 1 % Gel topical gel    VOLTAREN    100 g    Apply 4 grams to left buttocks four times daily using enclosed dosing card.    Left  buttock pain       dicyclomine 20 MG tablet    BENTYL    40 tablet    Take 1 tablet (20 mg) by mouth 4 times daily as needed (diarrhea/ abd pain)    Psoriasis, Irritable bowel syndrome with diarrhea       furosemide 20 MG tablet    LASIX    180 tablet    TAKE 2 TABLETS BY MOUTH ONCE DAILY    Generalized edema       losartan 100 MG tablet    COZAAR    90 tablet    Take 1 tablet (100 mg) by mouth daily    Essential hypertension with goal blood pressure less than 140/90       metFORMIN 500 MG tablet    GLUCOPHAGE    90 tablet    Take 1 tablet (500 mg) by mouth daily (with breakfast)    Type 2 diabetes mellitus without complication, without long-term current use of insulin (H)       PROBIOTIC PO           simvastatin 20 MG tablet    ZOCOR    45 tablet    TAKE 1/2 TABLET BY MOUTH NIGHTLY AT BEDTIME    Type 2 diabetes mellitus without complication, without long-term current use of insulin (H), Hyperlipidemia LDL goal <100       TYLENOL PO      Take 2,000 mg by mouth daily    Acute maxillary sinusitis, Right shoulder injury, initial encounter

## 2018-10-08 NOTE — NURSING NOTE
Due to injection administration, patient instructed to remain in clinic for 15 minutes  afterwards, and to report any adverse reaction to me immediately.  Jazzy Weems MA

## 2018-10-09 ASSESSMENT — ANXIETY QUESTIONNAIRES: GAD7 TOTAL SCORE: 0

## 2018-10-09 ASSESSMENT — PATIENT HEALTH QUESTIONNAIRE - PHQ9: SUM OF ALL RESPONSES TO PHQ QUESTIONS 1-9: 0

## 2019-04-08 DIAGNOSIS — E78.5 HYPERLIPIDEMIA LDL GOAL <100: ICD-10-CM

## 2019-04-08 NOTE — TELEPHONE ENCOUNTER
"Requested Prescriptions   Pending Prescriptions Disp Refills     amLODIPine (NORVASC) 10 MG tablet [Pharmacy Med Name: AMLODIPINE BESYLATE 10 MG TAB] 90 tablet 0     Sig: TAKE 1 TABLET BY MOUTH EVERY DAY   Last Written Prescription Date:  4-25-18  Last Fill Quantity: 90,  # refills: 3   Last office visit: 10/8/2018 with prescribing provider:  10-8-18   Future Office Visit:        Calcium Channel Blockers Protocol  Failed - 4/8/2019  9:38 AM        Failed - Blood pressure under 140/90 in past 12 months     BP Readings from Last 3 Encounters:   10/08/18 150/82   08/21/18 146/60   07/23/18 124/66                 Passed - Recent (12 mo) or future (30 days) visit within the authorizing provider's specialty     Patient had office visit in the last 12 months or has a visit in the next 30 days with authorizing provider or within the authorizing provider's specialty.  See \"Patient Info\" tab in inbasket, or \"Choose Columns\" in Meds & Orders section of the refill encounter.              Passed - Medication is active on med list        Passed - Patient is age 18 or older        Passed - No active pregnancy on record        Passed - Normal serum creatinine on file in past 12 months     Recent Labs   Lab Test 04/18/18  1000   CR 0.98             Passed - No positive pregnancy test in past 12 months          "

## 2019-04-09 RX ORDER — AMLODIPINE BESYLATE 10 MG/1
TABLET ORAL
Qty: 30 TABLET | Refills: 0 | Status: SHIPPED | OUTPATIENT
Start: 2019-04-09 | End: 2019-04-29

## 2019-04-09 NOTE — TELEPHONE ENCOUNTER
See plan at last visit 10/8/18:    Instructions         Return in about 6 months (around 4/8/2019).       Medication is being filled for 1 time refill only due to:  Patient needs to be seen because due for follow up per plan.     Encounter routed to team to reach out to patient to schedule.    Geno Barajas RN  [facility]

## 2019-04-29 ENCOUNTER — OFFICE VISIT (OUTPATIENT)
Dept: FAMILY MEDICINE | Facility: CLINIC | Age: 84
End: 2019-04-29
Payer: MEDICARE

## 2019-04-29 VITALS
BODY MASS INDEX: 28.66 KG/M2 | WEIGHT: 146 LBS | SYSTOLIC BLOOD PRESSURE: 138 MMHG | HEIGHT: 60 IN | TEMPERATURE: 97.3 F | DIASTOLIC BLOOD PRESSURE: 62 MMHG | OXYGEN SATURATION: 98 % | HEART RATE: 50 BPM

## 2019-04-29 DIAGNOSIS — R60.1 GENERALIZED EDEMA: ICD-10-CM

## 2019-04-29 DIAGNOSIS — I10 HTN, GOAL BELOW 150/90: Primary | ICD-10-CM

## 2019-04-29 DIAGNOSIS — E11.9 TYPE 2 DIABETES MELLITUS WITHOUT COMPLICATION, WITHOUT LONG-TERM CURRENT USE OF INSULIN (H): ICD-10-CM

## 2019-04-29 DIAGNOSIS — I10 ESSENTIAL HYPERTENSION WITH GOAL BLOOD PRESSURE LESS THAN 140/90: ICD-10-CM

## 2019-04-29 DIAGNOSIS — E78.5 HYPERLIPIDEMIA LDL GOAL <100: ICD-10-CM

## 2019-04-29 LAB
ANION GAP SERPL CALCULATED.3IONS-SCNC: 8 MMOL/L (ref 3–14)
BUN SERPL-MCNC: 17 MG/DL (ref 7–30)
CALCIUM SERPL-MCNC: 9.8 MG/DL (ref 8.5–10.1)
CHLORIDE SERPL-SCNC: 104 MMOL/L (ref 94–109)
CHOLEST SERPL-MCNC: 149 MG/DL
CO2 SERPL-SCNC: 28 MMOL/L (ref 20–32)
CREAT SERPL-MCNC: 0.9 MG/DL (ref 0.52–1.04)
CREAT UR-MCNC: 22 MG/DL
GFR SERPL CREATININE-BSD FRML MDRD: 60 ML/MIN/{1.73_M2}
GLUCOSE SERPL-MCNC: 151 MG/DL (ref 70–99)
HBA1C MFR BLD: 7.1 % (ref 0–5.6)
HDLC SERPL-MCNC: 50 MG/DL
LDLC SERPL CALC-MCNC: 68 MG/DL
MICROALBUMIN UR-MCNC: 10 MG/L
MICROALBUMIN/CREAT UR: 45.11 MG/G CR (ref 0–25)
NONHDLC SERPL-MCNC: 99 MG/DL
POTASSIUM SERPL-SCNC: 3.9 MMOL/L (ref 3.4–5.3)
SODIUM SERPL-SCNC: 140 MMOL/L (ref 133–144)
TRIGL SERPL-MCNC: 155 MG/DL

## 2019-04-29 PROCEDURE — 36415 COLL VENOUS BLD VENIPUNCTURE: CPT | Performed by: INTERNAL MEDICINE

## 2019-04-29 PROCEDURE — 80061 LIPID PANEL: CPT | Performed by: INTERNAL MEDICINE

## 2019-04-29 PROCEDURE — 83036 HEMOGLOBIN GLYCOSYLATED A1C: CPT | Performed by: INTERNAL MEDICINE

## 2019-04-29 PROCEDURE — 99207 C FOOT EXAM  NO CHARGE: CPT | Performed by: INTERNAL MEDICINE

## 2019-04-29 PROCEDURE — G0439 PPPS, SUBSEQ VISIT: HCPCS | Performed by: INTERNAL MEDICINE

## 2019-04-29 PROCEDURE — 82043 UR ALBUMIN QUANTITATIVE: CPT | Performed by: INTERNAL MEDICINE

## 2019-04-29 PROCEDURE — 80048 BASIC METABOLIC PNL TOTAL CA: CPT | Performed by: INTERNAL MEDICINE

## 2019-04-29 RX ORDER — AMLODIPINE BESYLATE 10 MG/1
10 TABLET ORAL DAILY
Qty: 90 TABLET | Refills: 3 | Status: SHIPPED | OUTPATIENT
Start: 2019-04-29 | End: 2020-04-02

## 2019-04-29 RX ORDER — FUROSEMIDE 20 MG
TABLET ORAL
Qty: 180 TABLET | Refills: 3 | Status: SHIPPED | OUTPATIENT
Start: 2019-04-29 | End: 2020-04-02

## 2019-04-29 RX ORDER — SIMVASTATIN 20 MG
TABLET ORAL
Qty: 45 TABLET | Refills: 3 | Status: SHIPPED | OUTPATIENT
Start: 2019-04-29 | End: 2020-04-02

## 2019-04-29 RX ORDER — LOSARTAN POTASSIUM 100 MG/1
100 TABLET ORAL DAILY
Qty: 90 TABLET | Refills: 3 | Status: SHIPPED | OUTPATIENT
Start: 2019-04-29 | End: 2020-04-02

## 2019-04-29 ASSESSMENT — ENCOUNTER SYMPTOMS
HEMATURIA: 0
CHILLS: 0
HEMATOCHEZIA: 0
ABDOMINAL PAIN: 0

## 2019-04-29 ASSESSMENT — ACTIVITIES OF DAILY LIVING (ADL): CURRENT_FUNCTION: NO ASSISTANCE NEEDED

## 2019-04-29 ASSESSMENT — MIFFLIN-ST. JEOR: SCORE: 1035.81

## 2019-04-29 NOTE — PROGRESS NOTES
"SUBJECTIVE:   Pam Hartman is a 84 year old female who presents for Preventive Visit.  Are you in the first 12 months of your Medicare coverage?  No    Healthy Habits:     In general, how would you rate your overall health?  Good    Frequency of exercise:  None    Do you usually eat at least 4 servings of fruit and vegetables a day, include whole grains    & fiber and avoid regularly eating high fat or \"junk\" foods?  Yes    Taking medications regularly:  Yes    Medication side effects:  None    Ability to successfully perform activities of daily living:  No assistance needed    Home Safety:  No safety concerns identified    Hearing Impairment:  Difficulty following a conversation in a noisy restaurant or crowded room and need to ask people to speak up or repeat themselves    In the past 6 months, have you been bothered by leaking of urine? Yes    In general, how would you rate your overall mental or emotional health?  Excellent      PHQ-2 Total Score: 0    Additional concerns today:  No    Questions about low dose ASA?  140-160  Blood pressure  133-147/71 ;   Then 1 and   Every other day  2 and 1 .  No added salt on the tablet.  ( not added)       Do you feel safe in your environment? Yes    Do you have a Health Care Directive? Yes: Advance Directive has been received and scanned.    Fall risk  Fallen 2 or more times in the past year?: No  Any fall with injury in the past year?: No    Cognitive Screening   1) Repeat 3 items (Leader, Season, Table)    2) Clock draw: NORMAL  3) 3 item recall: Recalls 3 objects  Results: 3 items recalled: COGNITIVE IMPAIRMENT LESS LIKELY    Mini-CogTM Copyright JUVENAL Saini. Licensed by the author for use in [facility]; reprinted with permission ([email or web address]). All rights reserved.      Do you have sleep apnea, excessive snoring or daytime drowsiness?: no    Reviewed and updated as needed this visit by clinical staff  Tobacco  Allergies  Med Hx  Surg Hx  Fam Hx  Soc Hx "        Reviewed and updated as needed this visit by Provider  Tobacco        Social History     Tobacco Use     Smoking status: Former Smoker     Last attempt to quit: 1984     Years since quittin.4     Smokeless tobacco: Never Used   Substance Use Topics     Alcohol use: Yes     Comment: 2 DRINKS PER MONTH     If you drink alcohol do you typically have >3 drinks per day or >7 drinks per week? No    Alcohol Use 2019   Prescreen: >3 drinks/day or >7 drinks/week? No   Prescreen: >3 drinks/day or >7 drinks/week? -   No flowsheet data found.        Current providers sharing in care for this patient include:   Patient Care Team:  Satish Pena MD as PCP - General  Satish Pena MD as Assigned PCP    The following health maintenance items are reviewed in Epic and correct as of today:  Health Maintenance   Topic Date Due     ZOSTER IMMUNIZATION (2 of 3) 2012     ADVANCE DIRECTIVE PLANNING Q5 YRS  2016     MEDICARE ANNUAL WELLNESS VISIT  01/15/2017     FALL RISK ASSESSMENT  10/08/2019     DELLA QUESTIONNAIRE 1 YEAR  10/08/2019     PHQ-9 Q1YR  10/08/2019     EYE EXAM Q1 YEAR  10/12/2019     A1C Q6 MO  10/29/2019     DTAP/TDAP/TD IMMUNIZATION (3 - Td) 2019     FOOT EXAM Q1 YEAR  2020     BMP Q1 YR  2020     LIPID MONITORING Q1 YEAR  2020     MICROALBUMIN Q1 YEAR  2020     TSH W/ FREE T4 REFLEX Q2 YEAR  10/01/2020     DEXA SCAN SCREENING (SYSTEM ASSIGNED)  Completed     INFLUENZA VACCINE  Completed     PNEUMOCOCCAL IMMUNIZATION 65+ LOW/MEDIUM RISK  Completed     IPV IMMUNIZATION  Aged Out     MENINGITIS IMMUNIZATION  Aged Out     Labs reviewed in EPIC  BP Readings from Last 3 Encounters:   19 138/62   10/08/18 150/82   18 146/60    Wt Readings from Last 3 Encounters:   19 66.2 kg (146 lb)   10/08/18 68.5 kg (151 lb)   18 68 kg (150 lb)                  Patient Active Problem List   Diagnosis     HYPERLIPIDEMIA LDL GOAL <100     Psoriasis      IBS (irritable bowel syndrome)     Health Care Home     ASCUS on Pap smear     Advanced directives, counseling/discussion     Type 2 diabetes, HbA1C goal < 8% (H)     Rotator cuff tear arthropathy     Chronic maxillary sinusitis     HTN, goal below 150/90     Type 2 diabetes mellitus without complication (H)     Past Surgical History:   Procedure Laterality Date     EYE SURGERY  2015    Left and right cataract repair     REVERSE ARTHROPLASTY SHOULDER Right 2017    Nahun Skinner MD at [facility]     SEPTOPLASTY  2014    Procedure: SEPTOPLASTY;  Septoplasty, Left ethmoidectomy, Left endoscopic maxillary antrostomy, right kike bullosa;  Surgeon: Sarah Garcia MD;  Location:  OR     TONSILLECTOMY & ADENOIDECTOMY         Social History     Tobacco Use     Smoking status: Former Smoker     Last attempt to quit: 1984     Years since quittin.4     Smokeless tobacco: Never Used   Substance Use Topics     Alcohol use: Yes     Comment: 2 DRINKS PER MONTH     Family History   Problem Relation Age of Onset     Cerebrovascular Disease Father          Current Outpatient Medications   Medication Sig Dispense Refill     Acetaminophen (TYLENOL PO) Take 2,000 mg by mouth daily        amLODIPine (NORVASC) 10 MG tablet Take 1 tablet (10 mg) by mouth daily 90 tablet 3     aspirin 81 MG tablet Take 1 tablet by mouth daily.       blood glucose monitoring (NO BRAND SPECIFIED) meter device kit Use to test blood sugar 2 times daily or as directed. 1 kit 0     blood glucose monitoring (NO BRAND SPECIFIED) test strip Use to test blood sugars 2 times daily or as directed 100 each 12     calcipotriene (DOVONEX) 0.005 % SOLN solution Apply to scalp BID as needed 1 Bottle 4     calcium carbonate-vitamin D 500-400 MG-UNIT TABS tablt ONE BY MOUTH TWICE A  tablet 3     dicyclomine (BENTYL) 20 MG tablet Take 1 tablet (20 mg) by mouth 4 times daily as needed (diarrhea/ abd pain) 40 tablet 1      "furosemide (LASIX) 20 MG tablet TAKE 2 TABLETS BY MOUTH ONCE DAILY 180 tablet 3     losartan (COZAAR) 100 MG tablet Take 1 tablet (100 mg) by mouth daily 90 tablet 3     metFORMIN (GLUCOPHAGE) 500 MG tablet Take 1 tablet (500 mg) by mouth daily (with breakfast) 90 tablet 3     Probiotic Product (PROBIOTIC PO)        simvastatin (ZOCOR) 20 MG tablet TAKE 1/2 TABLET BY MOUTH NIGHTLY AT BEDTIME 45 tablet 3     Allergies   Allergen Reactions     Septra [Bactrim]      Sulfa Drugs Rash     Recent Labs   Lab Test 04/29/19  0941 10/01/18  0922 04/18/18  1000  07/11/16  0935  07/24/15  0705   A1C 7.1* 7.7* 6.9*   < > 7.6*   < > 8.0*   LDL 68  --  66  --  69  --  79   HDL 50  --  47*  --  46*  --  44*   TRIG 155*  --  176*  --  175*  --  147   ALT  --   --  23  --  22  --  26   CR 0.90  --  0.98   < > 0.98   < > 0.84   GFRESTIMATED 60*  --  54*   < > 54*   < > 65   GFRESTBLACK 69  --  66   < > 66   < > 79   POTASSIUM 3.9  --  4.0   < > 3.9   < > 3.4   TSH  --  1.98  --   --  1.41  --  1.76    < > = values in this interval not displayed.          Review of Systems   Constitutional: Negative for chills.   Cardiovascular: Negative for chest pain.   Gastrointestinal: Negative for abdominal pain and hematochezia.   Genitourinary: Negative for hematuria.     Constitutional, HEENT, cardiovascular, pulmonary, gi and gu systems are negative, except as otherwise noted.    OBJECTIVE:   /62   Pulse 50   Temp 97.3  F (36.3  C) (Oral)   Ht 1.511 m (4' 11.5\")   Wt 66.2 kg (146 lb)   SpO2 98%   BMI 28.99 kg/m   Estimated body mass index is 28.99 kg/m  as calculated from the following:    Height as of this encounter: 1.511 m (4' 11.5\").    Weight as of this encounter: 66.2 kg (146 lb).  Physical Exam  GENERAL: healthy, alert and no distress  EYES: Eyes grossly normal to inspection, PERRL and conjunctivae and sclerae normal  HENT: ear canals and TM's normal, nose and mouth without ulcers or lesions  NECK: no adenopathy, no " asymmetry, masses, or scars and thyroid normal to palpation  RESP: lungs clear to auscultation - no rales, rhonchi or wheezes  CV: regular rate and rhythm, normal S1 S2, no S3 or S4, no murmur, click or rub, no peripheral edema and peripheral pulses strong  ABDOMEN: soft, nontender, no hepatosplenomegaly, no masses and bowel sounds normal  MS: no gross musculoskeletal defects noted, no edema  SKIN: no suspicious lesions or rashes  NEURO: Normal strength and tone, mentation intact and speech normal  BACK: no CVA tenderness, no paralumbar tenderness  PSYCH: mentation appears normal, affect normal/bright  LYMPH: no cervical, supraclavicular, axillary, or inguinal adenopathy    Diagnostic Test Results:  Results for orders placed or performed in visit on 04/29/19   Hemoglobin A1c   Result Value Ref Range    Hemoglobin A1C 7.1 (H) 0 - 5.6 %   Basic metabolic panel   Result Value Ref Range    Sodium 140 133 - 144 mmol/L    Potassium 3.9 3.4 - 5.3 mmol/L    Chloride 104 94 - 109 mmol/L    Carbon Dioxide 28 20 - 32 mmol/L    Anion Gap 8 3 - 14 mmol/L    Glucose 151 (H) 70 - 99 mg/dL    Urea Nitrogen 17 7 - 30 mg/dL    Creatinine 0.90 0.52 - 1.04 mg/dL    GFR Estimate 60 (L) >60 mL/min/[1.73_m2]    GFR Estimate If Black 69 >60 mL/min/[1.73_m2]    Calcium 9.8 8.5 - 10.1 mg/dL   Lipid panel reflex to direct LDL Fasting   Result Value Ref Range    Cholesterol 149 <200 mg/dL    Triglycerides 155 (H) <150 mg/dL    HDL Cholesterol 50 >49 mg/dL    LDL Cholesterol Calculated 68 <100 mg/dL    Non HDL Cholesterol 99 <130 mg/dL   Albumin Random Urine Quantitative with Creat Ratio   Result Value Ref Range    Creatinine Urine 22 mg/dL    Albumin Urine mg/L 10 mg/L    Albumin Urine mg/g Cr 45.11 (H) 0 - 25 mg/g Cr       ASSESSMENT / PLAN:       ICD-10-CM    1. HTN, goal below 150/90 I10 Basic metabolic panel   2. Hyperlipidemia LDL goal <100 E78.5 Lipid panel reflex to direct LDL Fasting     amLODIPine (NORVASC) 10 MG tablet      "simvastatin (ZOCOR) 20 MG tablet   3. Type 2 diabetes mellitus without complication, without long-term current use of insulin (H) E11.9 OPTOMETRY REFERRAL     Hemoglobin A1c     Albumin Random Urine Quantitative with Creat Ratio     FOOT EXAM     metFORMIN (GLUCOPHAGE) 500 MG tablet     simvastatin (ZOCOR) 20 MG tablet   4. Generalized edema R60.1 furosemide (LASIX) 20 MG tablet   5. Essential hypertension with goal blood pressure less than 140/90 I10 losartan (COZAAR) 100 MG tablet     BP     138/62  4/30/2019    Lab Results   Component Value Date     04/29/2019     Lab Results   Component Value Date    A1C 7.1 04/29/2019     Lab Results   Component Value Date    LDL 68 04/29/2019     Lab Results   Component Value Date    MICROL 10 04/29/2019     No results found for: MICROALBUMIN    End of Life Planning:  Patient currently has an advanced directive: Yes.  Practitioner is supportive of decision.    COUNSELING:  Reviewed preventive health counseling, as reflected in patient instructions       Regular exercise       Healthy diet/nutrition       Vision screening       Hearing screening       Dental care       Fall risk prevention    BP Readings from Last 1 Encounters:   04/29/19 138/62     Estimated body mass index is 28.99 kg/m  as calculated from the following:    Height as of this encounter: 1.511 m (4' 11.5\").    Weight as of this encounter: 66.2 kg (146 lb).      Weight management plan: Discussed healthy diet and exercise guidelines     reports that she quit smoking about 34 years ago. She has never used smokeless tobacco.      Appropriate preventive services were discussed with this patient, including applicable screening as appropriate for cardiovascular disease, diabetes, osteopenia/osteoporosis, and glaucoma.  As appropriate for age/gender, discussed screening for colorectal cancer, prostate cancer, breast cancer, and cervical cancer. Checklist reviewing preventive services available has been given to " the patient.    Reviewed patients plan of care and provided an AVS. The Basic Care Plan (routine screening as documented in Health Maintenance) for Pam meets the Care Plan requirement. This Care Plan has been established and reviewed with the Patient.      Consider switch to micardis if not improved in home blood pressures    Counseling Resources:  ATP IV Guidelines  Pooled Cohorts Equation Calculator  Breast Cancer Risk Calculator  FRAX Risk Assessment  ICSI Preventive Guidelines  Dietary Guidelines for Americans, 2010  [employer]'s MyPlate  ASA Prophylaxis  Lung CA Screening    Satish Pena MD  [facility]    Identified Health Risks:

## 2019-04-29 NOTE — PATIENT INSTRUCTIONS
Patient Education     Low-Salt Diet  This diet removes foods that are high in salt. It also limits the amount of salt you use when cooking. It is most often used for people with high blood pressure, edema (fluid retention), and kidney, liver, or heart disease.  Table salt contains the mineral sodium. Your body needs sodium to work normally. But too much sodium can make your health problems worse. Your healthcare provider is recommending a low-salt (also called low-sodium) diet for you. Your total daily allowance of salt is 1,500 to 2,300 milligrams (mg). It is less than 1 teaspoon of table salt. This means you can have only about 500 to 700 mg of sodium at each meal. People with certain health problems should limit salt intake to the lower end of the recommended range.    When you cook, don t add much salt. If you can cook without using salt, even better. Don t add salt to your food at the table.  When shopping, read food labels. Salt is often called sodium on the label. Choose foods that are salt-free, low salt, or very low salt. Note that foods with reduced salt may not lower your salt intake enough.    Beans, potatoes, and pasta  Ok: Dry beans, split peas, lentils, potatoes, rice, macaroni, pasta, spaghetti without added salt  Avoid: Potato chips, tortilla chips, and similar products  Breads and cereals  Ok: Low-sodium breads, rolls, cereals, and cakes; low-salt crackers, matzo crackers  Avoid: Salted crackers, pretzels, popcorn, Azeri toast, pancakes, muffins  Dairy  Ok: Milk, chocolate milk, hot chocolate mix, low-salt cheeses, and yogurt  Avoid: Processed cheese and cheese spreads; Roquefort, Camembert, and cottage cheese; buttermilk, instant breakfast drink  Desserts  Ok: Ice cream, frozen yogurt, juice bars, gelatin, cookies and pies, sugar, honey, jelly, hard candy  Avoid: Most pies, cakes and cookies prepared or processed with salt; instant pudding  Drinks  Ok: Tea, coffee, fizzy (carbonated) drinks,  juices  Avoid: Flavored coffees, electrolyte replacement drinks, sports drinks  Meats  Ok: All fresh meat, fish, poultry, low-salt tuna, eggs, egg substitute  Avoid: Smoked, pickled, brine-cured, or salted meats and fish. This includes hanson, chipped beef, corned beef, hot dogs, deli meats, ham, kosher meats, salt pork, sausage, canned tuna, salted codfish, smoked salmon, herring, sardines, or anchovies.  Seasonings and spices  Ok: Most seasonings are okay. Good substitutes for salt include: fresh herb blends, hot sauce, lemon, garlic, menjivar, vinegar, dry mustard, parsley, cilantro, horseradish, tomato paste, regular margarine, mayonnaise, unsalted butter, cream cheese, vegetable oil, cream, low-salt salad dressing and gravy.  Avoid: Regular ketchup, relishes, pickles, soy sauce, teriyaki sauce, Worcestershire sauce, BBQ sauce, tartar sauce, meat tenderizer, chili sauce, regular gravy, regular salad dressing, salted butter  Soups  Ok: Low-salt soups and broths made with allowed foods  Avoid: Bouillon cubes, soups with smoked or salted meats, regular soup and broth  Vegetables  Ok: Most vegetables are okay; also low-salt tomato and vegetable juices  Avoid: Sauerkraut and other brine-soaked vegetables; pickles and other pickled vegetables; tomato juice, olives  Date Last Reviewed: 8/1/2016 2000-2018 [employer]!. [address] [place] [address]. All rights reserved. This information is not intended as a substitute for professional medical care. Always follow your healthcare professional's instructions.           Patient Education     Discharge Instructions: Eating a Low-Salt Diet  Your healthcare provider has prescribed a low-salt diet for you. Most people with heart problems need to eat less salt, which is full of sodium. Too much sodium is linked to high blood pressure, which is linked to a greater risk of heart disease, stroke, blindness, and kidney problems.  Home care    Learn ways to  cut back on salt (sodium):    Eat less frozen, canned, dried, packaged, and fast foods. These often contain high amounts of sodium.    Season foods with herbs instead of salt when you cook.    Season with flavorings such as pepper, lemon, garlic, and onion.    Don t add salt to your food at the table.    Sprinkle salt-free herbal blends on meats and vegetables.  Learn to read food labels carefully:    Look for the total amount of sodium per serving.    Look for foods labeled low sodium, reduced sodium, or no added salt.    Beware. Salt goes by many names. Cut down on foods with these words (all forms of salt) listed as ingredients:  ? Salt  ? Sodium  ? Soy sauce  ? Baking soda  ? Baking powder  ? MSG (monosodium glutamate)  ? Monosodium  ? Na (the chemical symbol for sodium)  Other ideas:    Use more fresh food. Buy more fruits and vegetables.    Select lean meats, fish, and poultry.    Find a cookbook with low-salt recipes. You ll find ideas for tasty meals that are healthy for your heart.    When eating out, ask questions about the menu. Tell the  you're on a low-salt diet.  ? If you order fish, chicken, beef, or pork, ask to have it broiled, baked, poached, or grilled without salt, butter, or breading.  ? Choose plain steamed rice, boiled noodles, and baked or boiled potatoes. Top potatoes with chives and a little sour cream instead of butter.    Avoid antacids that are high in salt. Check the label before you buy.  Follow-up  Make a follow-up appointment with your healthcare provider, or as advised. Your provider may refer you to a dietitian.   Date Last Reviewed: 6/1/2017 2000-2018 [employer]. [address] [place] [address], [place] [address]. All rights reserved. This information is not intended as a substitute for professional medical care. Always follow your healthcare professional's instructions.           Patient Education     Low-Salt Choices  Eating salt (sodium) can make your body retain  too much water. Excess water makes your heart work harder. Canned, packaged, and frozen foods are easy to prepare. But they are often high in sodium. Here are some ideas for low-salt foods you can easily make yourself.    For breakfast    Fruit or 100% fruit juice    Whole-wheat bread or an English muffin. Look for sodium content on Nutrition Facts labels.    Low-fat milk or yogurt    Unsalted eggs    Shredded wheat    Corn tortillas    Unsalted steamed rice    Regular (not instant) hot cereal, made without salt  Stay away from:    Sausage, hanson, and ham    Flour tortillas    Packaged muffins, pancakes, and biscuits    Instant hot cereals    Cottage cheese  For lunch and dinner    Fresh fish, chicken, turkey, or meat baked, broiled, or roasted without salt    Dry beans, cooked without salt    Tofu, stir-fried without salt    Unsalted fresh fruit and vegetables, or frozen or canned fruit and vegetables with no added salt  Stay away from:    Lunch or deli meat that is cured or smoked    Cheese    Tomato juice and ketchup    Canned vegetables, soups, and fish not labeled as no-salt-added or reduced sodium    Packaged gravies and sauces    Olives, pickles, and relish    Bottled salad dressings  For snacks and desserts    Yogurt    Unsalted, air-popped popcorn    Unsalted nuts or seeds  Stay away from:    Pies and cakes    Packaged dessert mixes    Pizza    Canned and packaged puddings    Pretzels, chips, crackers, and nuts unless the label says unsalted  Date Last Reviewed: 6/1/2017 2000-2018 [employer]. [address] [place] [address], [place] [address]. All rights reserved. This information is not intended as a substitute for professional medical care. Always follow your healthcare professional's instructions.

## 2019-07-12 DIAGNOSIS — E11.9 TYPE 2 DIABETES MELLITUS WITHOUT COMPLICATION, WITHOUT LONG-TERM CURRENT USE OF INSULIN (H): Primary | ICD-10-CM

## 2019-07-12 DIAGNOSIS — R73.02 GLUCOSE INTOLERANCE (IMPAIRED GLUCOSE TOLERANCE): ICD-10-CM

## 2019-07-12 DIAGNOSIS — K58.0 IRRITABLE BOWEL SYNDROME WITH DIARRHEA: ICD-10-CM

## 2019-07-12 DIAGNOSIS — T78.3XXA ANGIOEDEMA, INITIAL ENCOUNTER: ICD-10-CM

## 2019-07-12 DIAGNOSIS — L40.9 PSORIASIS: ICD-10-CM

## 2019-07-12 RX ORDER — DICYCLOMINE HCL 20 MG
TABLET ORAL
Qty: 40 TABLET | Refills: 0 | Status: SHIPPED | OUTPATIENT
Start: 2019-07-12 | End: 2020-04-02

## 2019-07-12 RX ORDER — LANCETS
EACH MISCELLANEOUS
Qty: 200 EACH | Refills: 3 | Status: SHIPPED | OUTPATIENT
Start: 2019-07-12 | End: 2023-08-29

## 2019-07-12 NOTE — TELEPHONE ENCOUNTER
Reason for Call:  Medication or medication refill:    Do you use a [facility]? No     Name of the pharmacy and phone number for the current request:       [id number] - [place], [place] - [address]      Name of the medication requested: Lancets and test strips    Other request: Patient is requesting a new prescription to be sent to her pharmacy for testing supplies.  She states her pharmacy said that the ones on file have .      Can we leave a detailed message on this number? YES    Phone number patient can be reached at: Home number on file [phone] (home)    Best Time: Any    Call taken on 2019 at 2:11 PM by Pratima Palma

## 2019-07-12 NOTE — TELEPHONE ENCOUNTER
"Requested Prescriptions   Pending Prescriptions Disp Refills     dicyclomine (BENTYL) 20 MG tablet [Pharmacy Med Name: DICYCLOMINE 20 MG TABLET] 40 tablet 0     Sig: TAKE 1 TABLET (20 MG) BY MOUTH 4 TIMES DAILY AS NEEDED.   Last Written Prescription Date:  10-8-18  Last Fill Quantity: 40,  # refills: 1   Last office visit: 4/29/2019 with prescribing provider:  4-29-19   Future Office Visit:   Next 5 appointments (look out 90 days)    Jul 15, 2019  9:30 AM CDT  Return Visit with Nahun Skinner MD  [facility] ([place] [id number] [facility] [id number]  [phone]             Oral Anticholinergic Agents Passed - 7/12/2019  9:23 AM        Passed - Patient is of age 12 or older        Passed - Recent (12 mo) or future (30 days) visit with authorizing provider's specialty     Patient had office visit in the last 12 months or has a visit in the next 30 days with authorizing provider or within the authorizing provider's specialty.  See \"Patient Info\" tab in inbasket, or \"Choose Columns\" in Meds & Orders section of the refill encounter.              Passed - Medication is active on med list        Passed - Patient is not pregnant        Passed - No positive pregnancy test on file within past 12 months          "

## 2019-07-12 NOTE — TELEPHONE ENCOUNTER
Prescription approved per [facility] Refill Protocol.      Brielle Pedroza RN  [facility]

## 2019-07-12 NOTE — TELEPHONE ENCOUNTER
"Requested Prescriptions   Pending Prescriptions Disp Refills     blood glucose (NO BRAND SPECIFIED) test strip 100 each 12     Sig: Use to test blood sugars 2 times daily or as directed       Diabetic Supplies Protocol Passed - 7/12/2019  2:56 PM        Passed - Medication is active on med list        Passed - Patient is 18 years of age or older        Passed - Recent (6 mo) or future (30 days) visit within the authorizing provider's specialty     Patient had office visit in the last 6 months or has a visit in the next 30 days with authorizing provider.  See \"Patient Info\" tab in inbasket, or \"Choose Columns\" in Meds & Orders section of the refill encounter.            thin (NO BRAND SPECIFIED) lancets 200 each 3     Sig: Use with lanceting device. To accompany: Blood Glucose Monitor Brands: per insurance.       Diabetic Supplies Protocol Failed - 7/12/2019  2:56 PM        Failed - Medication is active on med list        Passed - Patient is 18 years of age or older        Passed - Recent (6 mo) or future (30 days) visit within the authorizing provider's specialty     Patient had office visit in the last 6 months or has a visit in the next 30 days with authorizing provider.  See \"Patient Info\" tab in inbasket, or \"Choose Columns\" in Meds & Orders section of the refill encounter.            Prescription approved per [facility] Refill Protocol.      Brielle Pedroza RN  [facility]      "

## 2019-07-15 ENCOUNTER — ANCILLARY PROCEDURE (OUTPATIENT)
Dept: GENERAL RADIOLOGY | Facility: CLINIC | Age: 84
End: 2019-07-15
Attending: ORTHOPAEDIC SURGERY
Payer: MEDICARE

## 2019-07-15 ENCOUNTER — OFFICE VISIT (OUTPATIENT)
Dept: ORTHOPEDICS | Facility: CLINIC | Age: 84
End: 2019-07-15
Payer: MEDICARE

## 2019-07-15 VITALS
HEIGHT: 60 IN | DIASTOLIC BLOOD PRESSURE: 59 MMHG | HEART RATE: 69 BPM | SYSTOLIC BLOOD PRESSURE: 137 MMHG | BODY MASS INDEX: 28.66 KG/M2 | RESPIRATION RATE: 13 BRPM | WEIGHT: 146 LBS

## 2019-07-15 DIAGNOSIS — Z96.611 STATUS POST REVERSE TOTAL ARTHROPLASTY OF RIGHT SHOULDER: ICD-10-CM

## 2019-07-15 DIAGNOSIS — Z96.611 STATUS POST REVERSE TOTAL ARTHROPLASTY OF RIGHT SHOULDER: Primary | ICD-10-CM

## 2019-07-15 PROCEDURE — 73030 X-RAY EXAM OF SHOULDER: CPT | Mod: RT

## 2019-07-15 PROCEDURE — 99213 OFFICE O/P EST LOW 20 MIN: CPT | Performed by: ORTHOPAEDIC SURGERY

## 2019-07-15 ASSESSMENT — MIFFLIN-ST. JEOR: SCORE: 1035.81

## 2019-07-15 NOTE — LETTER
7/15/2019         RE: Pam Hartman  [address]  [address]  [facility] [address]        Dear Colleague,    Thank you for referring your patient, Pam Hartman, to the [facility]. Please see a copy of my visit note below.    Pam Hartman is in for routine follow up of right reverse total shoulder arthroplasty on 17.    She has no complaints.    Past Medical History:   Diagnosis Date     Colon polyp      Degenerative arthritis      DM II (diabetes mellitus, type II)      HTN      Hyperlipidemia LDL goal < 100      IBS (irritable bowel syndrome)      Osteopenia      Psoriasis      Type 2 diabetes mellitus without complication (H) 10/21/2015     Ulnar neuropathy        Past Surgical History:   Procedure Laterality Date     EYE SURGERY  2015    Left and right cataract repair     REVERSE ARTHROPLASTY SHOULDER Right 2017    Nahun Skinner MD at [facility]     SEPTOPLASTY  2014    Procedure: SEPTOPLASTY;  Septoplasty, Left ethmoidectomy, Left endoscopic maxillary antrostomy, right kike bullosa;  Surgeon: Sarah Garcia MD;  Location: [facility] OR     TONSILLECTOMY & ADENOIDECTOMY         Family History   Problem Relation Age of Onset     Cerebrovascular Disease Father        Social History     Socioeconomic History     Marital status:      Spouse name: Not on file     Number of children: Not on file     Years of education: Not on file     Highest education level: Not on file   Occupational History     Not on file   Social Needs     Financial resource strain: Not on file     Food insecurity:     Worry: Not on file     Inability: Not on file     Transportation needs:     Medical: Not on file     Non-medical: Not on file   Tobacco Use     Smoking status: Former Smoker     Last attempt to quit: 1984     Years since quittin.6     Smokeless tobacco: Never Used   Substance and Sexual Activity     Alcohol use: Yes     Comment: 2 DRINKS PER MONTH      Drug use: No     Sexual activity: Never   Lifestyle     Physical activity:     Days per week: Not on file     Minutes per session: Not on file     Stress: Not on file   Relationships     Social connections:     Talks on phone: Not on file     Gets together: Not on file     Attends Congregation service: Not on file     Active member of club or organization: Not on file     Attends meetings of clubs or organizations: Not on file     Relationship status: Not on file     Intimate partner violence:     Fear of current or ex partner: Not on file     Emotionally abused: Not on file     Physically abused: Not on file     Forced sexual activity: Not on file   Other Topics Concern     Parent/sibling w/ CABG, MI or angioplasty before 65F 55M? No   Social History Narrative     Not on file       Current Outpatient Medications   Medication Sig Dispense Refill     Acetaminophen (TYLENOL PO) Take 2,000 mg by mouth daily        amLODIPine (NORVASC) 10 MG tablet Take 1 tablet (10 mg) by mouth daily 90 tablet 3     aspirin 81 MG tablet Take 1 tablet by mouth daily.       blood glucose (NO BRAND SPECIFIED) test strip Use to test blood sugars 2 times daily or as directed 100 each 12     blood glucose monitoring (NO BRAND SPECIFIED) meter device kit Use to test blood sugar 2 times daily or as directed. 1 kit 0     calcipotriene (DOVONEX) 0.005 % SOLN solution Apply to scalp BID as needed 1 Bottle 4     calcium carbonate-vitamin D 500-400 MG-UNIT TABS tablt ONE BY MOUTH TWICE A  tablet 3     dicyclomine (BENTYL) 20 MG tablet TAKE 1 TABLET (20 MG) BY MOUTH 4 TIMES DAILY AS NEEDED. 40 tablet 0     furosemide (LASIX) 20 MG tablet TAKE 2 TABLETS BY MOUTH ONCE DAILY 180 tablet 3     losartan (COZAAR) 100 MG tablet Take 1 tablet (100 mg) by mouth daily 90 tablet 3     metFORMIN (GLUCOPHAGE) 500 MG tablet Take 1 tablet (500 mg) by mouth daily (with breakfast) 90 tablet 3     Probiotic Product (PROBIOTIC PO)        simvastatin (ZOCOR) 20 MG  "tablet TAKE 1/2 TABLET BY MOUTH NIGHTLY AT BEDTIME 45 tablet 3     thin (NO BRAND SPECIFIED) lancets Use with lanceting device. To accompany: Blood Glucose Monitor Brands: per insurance. 200 each 3       Allergies   Allergen Reactions     Septra [Bactrim]      Sulfa Drugs Rash       REVIEW OF SYSTEMS:  CONSTITUTIONAL:  NEGATIVE for fever, chills, change in weight  INTEGUMENTARY/SKIN:  NEGATIVE for worrisome rashes, moles or lesions  EYES:  NEGATIVE for vision changes or irritation  ENT/MOUTH:  NEGATIVE for ear, mouth and throat problems  RESP:  NEGATIVE for significant cough or SOB  BREAST:  NEGATIVE for masses, tenderness or discharge  CV:  NEGATIVE for chest pain, palpitations or peripheral edema  GI:  NEGATIVE for nausea, abdominal pain, heartburn, or change in bowel habits  :  Negative   MUSCULOSKELETAL:  See HPI above  NEURO:  NEGATIVE for weakness, dizziness or paresthesias  ENDOCRINE:  NEGATIVE for temperature intolerance, skin/hair changes  HEME/ALLERGY/IMMUNE:  NEGATIVE for bleeding problems  PSYCHIATRIC:  NEGATIVE for changes in mood or affect         Xray today shows good position of components.  there is lucency next to lateral shoulder of the stem.    Exam:    Vitals: /59   Pulse 69   Resp 13   Ht 1.511 m (4' 11.5\")   Wt 66.2 kg (146 lb)   BMI 28.99 kg/m     BMI= Body mass index is 28.99 kg/m ..  Range of motion:   Left Flexion:  160 degrees.  Right flexion:  150 degrees.  Left external rotation 70 degrees, internal rotation T6 degrees.  No pain.  Right external rotation 70 degrees, internal rotation S1 degrees.  No pain.  Strength:   External rotation:  4/5  Internal rotation: 5/5  Abduction: 4/5    Sensation, motor, and circulation intact.  Well healed scar.  No erythema or increased warmth.      Assessment:  right reverse total shoulder arthroplasty doing well. Lucency around prosthesis should be watched.    Plan:  Activity as tolerated.  Take antibiotics before dental work.  Return to " clinic 2-4 years with xray  right shoulder .    Again, thank you for allowing me to participate in the care of your patient.        Sincerely,        Nahun Skinner MD

## 2019-07-15 NOTE — PROGRESS NOTES
Pam Hartman is in for routine follow up of right reverse total shoulder arthroplasty on 17.    She has no complaints.    Past Medical History:   Diagnosis Date     Colon polyp      Degenerative arthritis      DM II (diabetes mellitus, type II)      HTN      Hyperlipidemia LDL goal < 100      IBS (irritable bowel syndrome)      Osteopenia      Psoriasis      Type 2 diabetes mellitus without complication (H) 10/21/2015     Ulnar neuropathy        Past Surgical History:   Procedure Laterality Date     EYE SURGERY  2015    Left and right cataract repair     REVERSE ARTHROPLASTY SHOULDER Right 2017    Nahun Skinner MD at [facility]     SEPTOPLASTY  2014    Procedure: SEPTOPLASTY;  Septoplasty, Left ethmoidectomy, Left endoscopic maxillary antrostomy, right kike bullosa;  Surgeon: Sarah Garcia MD;  Location: [facility] OR     TONSILLECTOMY & ADENOIDECTOMY         Family History   Problem Relation Age of Onset     Cerebrovascular Disease Father        Social History     Socioeconomic History     Marital status:      Spouse name: Not on file     Number of children: Not on file     Years of education: Not on file     Highest education level: Not on file   Occupational History     Not on file   Social Needs     Financial resource strain: Not on file     Food insecurity:     Worry: Not on file     Inability: Not on file     Transportation needs:     Medical: Not on file     Non-medical: Not on file   Tobacco Use     Smoking status: Former Smoker     Last attempt to quit: 1984     Years since quittin.6     Smokeless tobacco: Never Used   Substance and Sexual Activity     Alcohol use: Yes     Comment: 2 DRINKS PER MONTH     Drug use: No     Sexual activity: Never   Lifestyle     Physical activity:     Days per week: Not on file     Minutes per session: Not on file     Stress: Not on file   Relationships     Social connections:     Talks on phone: Not on file     Gets together:  Not on file     Attends Rest[employer] service: Not on file     Active member of club or organization: Not on file     Attends meetings of clubs or organizations: Not on file     Relationship status: Not on file     Intimate partner violence:     Fear of current or ex partner: Not on file     Emotionally abused: Not on file     Physically abused: Not on file     Forced sexual activity: Not on file   Other Topics Concern     Parent/sibling w/ CABG, MI or angioplasty before 65F 55M? No   Social History Narrative     Not on file       Current Outpatient Medications   Medication Sig Dispense Refill     Acetaminophen (TYLENOL PO) Take 2,000 mg by mouth daily        amLODIPine (NORVASC) 10 MG tablet Take 1 tablet (10 mg) by mouth daily 90 tablet 3     aspirin 81 MG tablet Take 1 tablet by mouth daily.       blood glucose (NO BRAND SPECIFIED) test strip Use to test blood sugars 2 times daily or as directed 100 each 12     blood glucose monitoring (NO BRAND SPECIFIED) meter device kit Use to test blood sugar 2 times daily or as directed. 1 kit 0     calcipotriene (DOVONEX) 0.005 % SOLN solution Apply to scalp BID as needed 1 Bottle 4     calcium carbonate-vitamin D 500-400 MG-UNIT TABS tablt ONE BY MOUTH TWICE A  tablet 3     dicyclomine (BENTYL) 20 MG tablet TAKE 1 TABLET (20 MG) BY MOUTH 4 TIMES DAILY AS NEEDED. 40 tablet 0     furosemide (LASIX) 20 MG tablet TAKE 2 TABLETS BY MOUTH ONCE DAILY 180 tablet 3     losartan (COZAAR) 100 MG tablet Take 1 tablet (100 mg) by mouth daily 90 tablet 3     metFORMIN (GLUCOPHAGE) 500 MG tablet Take 1 tablet (500 mg) by mouth daily (with breakfast) 90 tablet 3     Probiotic Product (PROBIOTIC PO)        simvastatin (ZOCOR) 20 MG tablet TAKE 1/2 TABLET BY MOUTH NIGHTLY AT BEDTIME 45 tablet 3     thin (NO BRAND SPECIFIED) lancets Use with lanceting device. To accompany: Blood Glucose Monitor Brands: per insurance. 200 each 3       Allergies   Allergen Reactions     Septra [Bactrim]   "    Sulfa Drugs Rash       REVIEW OF SYSTEMS:  CONSTITUTIONAL:  NEGATIVE for fever, chills, change in weight  INTEGUMENTARY/SKIN:  NEGATIVE for worrisome rashes, moles or lesions  EYES:  NEGATIVE for vision changes or irritation  ENT/MOUTH:  NEGATIVE for ear, mouth and throat problems  RESP:  NEGATIVE for significant cough or SOB  BREAST:  NEGATIVE for masses, tenderness or discharge  CV:  NEGATIVE for chest pain, palpitations or peripheral edema  GI:  NEGATIVE for nausea, abdominal pain, heartburn, or change in bowel habits  :  Negative   MUSCULOSKELETAL:  See HPI above  NEURO:  NEGATIVE for weakness, dizziness or paresthesias  ENDOCRINE:  NEGATIVE for temperature intolerance, skin/hair changes  HEME/ALLERGY/IMMUNE:  NEGATIVE for bleeding problems  PSYCHIATRIC:  NEGATIVE for changes in mood or affect         Xray today shows good position of components.  there is lucency next to lateral shoulder of the stem.    Exam:    Vitals: /59   Pulse 69   Resp 13   Ht 1.511 m (4' 11.5\")   Wt 66.2 kg (146 lb)   BMI 28.99 kg/m    BMI= Body mass index is 28.99 kg/m ..  Range of motion:   Left Flexion:  160 degrees.  Right flexion:  150 degrees.  Left external rotation 70 degrees, internal rotation T6 degrees.  No pain.  Right external rotation 70 degrees, internal rotation S1 degrees.  No pain.  Strength:   External rotation:  4/5  Internal rotation: 5/5  Abduction: 4/5    Sensation, motor, and circulation intact.  Well healed scar.  No erythema or increased warmth.      Assessment:  right reverse total shoulder arthroplasty doing well. Lucency around prosthesis should be watched.    Plan:  Activity as tolerated.  Take antibiotics before dental work.  Return to clinic 2-4 years with xray  right shoulder .  "

## 2019-07-16 DIAGNOSIS — R73.02 GLUCOSE INTOLERANCE (IMPAIRED GLUCOSE TOLERANCE): ICD-10-CM

## 2019-07-16 DIAGNOSIS — T78.3XXA ANGIOEDEMA, INITIAL ENCOUNTER: ICD-10-CM

## 2019-07-16 NOTE — TELEPHONE ENCOUNTER
Pharmacy states the Medicare part b will only pay for once a day test strips, if not using insulin.  Please send a new RX with once daily testing.    [facility] [address] [phone]

## 2020-02-14 ENCOUNTER — TELEPHONE (OUTPATIENT)
Dept: FAMILY MEDICINE | Facility: CLINIC | Age: 85
End: 2020-02-14

## 2020-02-14 NOTE — TELEPHONE ENCOUNTER
Attempt # 1  Called patient at home number.  Was call answered?  Yes, pharmacy not able to fill Losartan due to back order, only received 10 tablets, does have a 15 day supply right now.     Called [facility] pharmacy at [phone] - loan Shanthi who states just received a shipment today, will be able to fill the remainder on the 19th.          Patient requests a call back this afternoon to inform of process may leave message that medication has been ordered.    Called patient and informed of above, Patient verbalized understanding and agreement with apple Pedroza RN  [facility]

## 2020-02-14 NOTE — TELEPHONE ENCOUNTER
Reason for Call:  Other prescription ( Losartin)    Detailed comments: Pt would like a call back to discuss an alternative medication since she was unable to get all 90 pills due to shortage of Rx.    Phone Number Patient can be reached at: Home number on file [phone] (home)    Best Time: Anytime    Can we leave a detailed message on this number? NO    Call taken on 2/14/2020 at 12:32 PM by Mariaelena Benavidez

## 2020-04-02 DIAGNOSIS — E11.9 TYPE 2 DIABETES MELLITUS WITHOUT COMPLICATION, WITHOUT LONG-TERM CURRENT USE OF INSULIN (H): ICD-10-CM

## 2020-04-02 DIAGNOSIS — I10 ESSENTIAL HYPERTENSION WITH GOAL BLOOD PRESSURE LESS THAN 140/90: ICD-10-CM

## 2020-04-02 DIAGNOSIS — E78.5 HYPERLIPIDEMIA LDL GOAL <100: ICD-10-CM

## 2020-04-02 DIAGNOSIS — L40.9 PSORIASIS: ICD-10-CM

## 2020-04-02 DIAGNOSIS — R60.1 GENERALIZED EDEMA: ICD-10-CM

## 2020-04-02 DIAGNOSIS — K58.0 IRRITABLE BOWEL SYNDROME WITH DIARRHEA: ICD-10-CM

## 2020-04-02 RX ORDER — DICYCLOMINE HCL 20 MG
TABLET ORAL
Qty: 40 TABLET | Refills: 0 | Status: SHIPPED | OUTPATIENT
Start: 2020-04-02 | End: 2023-06-01

## 2020-04-02 RX ORDER — FUROSEMIDE 20 MG
TABLET ORAL
Qty: 180 TABLET | Refills: 3 | Status: SHIPPED | OUTPATIENT
Start: 2020-04-02 | End: 2021-05-03

## 2020-04-02 RX ORDER — LOSARTAN POTASSIUM 100 MG/1
100 TABLET ORAL DAILY
Qty: 90 TABLET | Refills: 3 | Status: SHIPPED | OUTPATIENT
Start: 2020-04-02 | End: 2021-05-03

## 2020-04-02 RX ORDER — AMLODIPINE BESYLATE 10 MG/1
10 TABLET ORAL DAILY
Qty: 90 TABLET | Refills: 3 | Status: SHIPPED | OUTPATIENT
Start: 2020-04-02 | End: 2021-05-03

## 2020-04-02 RX ORDER — SIMVASTATIN 20 MG
TABLET ORAL
Qty: 45 TABLET | Refills: 3 | Status: SHIPPED | OUTPATIENT
Start: 2020-04-02 | End: 2021-04-27

## 2020-07-27 ENCOUNTER — OFFICE VISIT (OUTPATIENT)
Dept: OPTOMETRY | Facility: CLINIC | Age: 85
End: 2020-07-27
Payer: MEDICARE

## 2020-07-27 DIAGNOSIS — Z01.01 ENCOUNTER FOR EXAMINATION OF EYES AND VISION WITH ABNORMAL FINDINGS: Primary | ICD-10-CM

## 2020-07-27 DIAGNOSIS — H52.13 MYOPIA OF BOTH EYES: ICD-10-CM

## 2020-07-27 DIAGNOSIS — Z96.1 PSEUDOPHAKIA: ICD-10-CM

## 2020-07-27 DIAGNOSIS — Z98.890 STATUS POST YAG CAPSULOTOMY OF BOTH EYES: ICD-10-CM

## 2020-07-27 DIAGNOSIS — H52.223 REGULAR ASTIGMATISM OF BOTH EYES: ICD-10-CM

## 2020-07-27 DIAGNOSIS — H52.4 PRESBYOPIA: ICD-10-CM

## 2020-07-27 DIAGNOSIS — E11.9 TYPE 2 DIABETES MELLITUS WITHOUT RETINOPATHY (H): ICD-10-CM

## 2020-07-27 PROCEDURE — 92004 COMPRE OPH EXAM NEW PT 1/>: CPT | Performed by: OPTOMETRIST

## 2020-07-27 ASSESSMENT — REFRACTION_MANIFEST
OD_AXIS: 008
OD_CYLINDER: +3.00
OS_CYLINDER: +2.50
OS_SPHERE: -0.75
OS_AXIS: 160
OD_ADD: +2.75
OD_SPHERE: -0.50
OS_ADD: +2.75

## 2020-07-27 ASSESSMENT — SLIT LAMP EXAM - LIDS
COMMENTS: NORMAL
COMMENTS: NORMAL

## 2020-07-27 ASSESSMENT — REFRACTION_WEARINGRX
OD_SPHERE: -0.75
OD_AXIS: 005
OS_AXIS: 165
OS_ADD: +2.75
OS_SPHERE: -0.50
OD_CYLINDER: +2.75
OD_ADD: +2.75
OS_CYLINDER: +2.00

## 2020-07-27 ASSESSMENT — VISUAL ACUITY
CORRECTION_TYPE: GLASSES
OD_CC+: -3
OS_CC: 20/25
OS_CC+: -1
METHOD: SNELLEN - LINEAR
OD_CC: 20/25

## 2020-07-27 ASSESSMENT — CUP TO DISC RATIO
OS_RATIO: 0.3
OD_RATIO: 0.3

## 2020-07-27 ASSESSMENT — CONF VISUAL FIELD
OS_NORMAL: 1
METHOD: COUNTING FINGERS
OD_NORMAL: 1

## 2020-07-27 ASSESSMENT — EXTERNAL EXAM - RIGHT EYE: OD_EXAM: NORMAL

## 2020-07-27 ASSESSMENT — EXTERNAL EXAM - LEFT EYE: OS_EXAM: NORMAL

## 2020-07-27 ASSESSMENT — TONOMETRY
OD_IOP_MMHG: 14
OS_IOP_MMHG: 14
IOP_METHOD: APPLANATION

## 2020-07-27 NOTE — PATIENT INSTRUCTIONS
Patient educated on importance of good blood sugar control.  Letter sent to primary care provider with diabetic eye exam report.     Glasses prescription provided today. Mild change.     The effects of the dilating drops last for 4- 6 hours.  You will be more sensitive to light and vision will be blurry up close.  Mydriatic sunglasses were given if needed.    Return in 1 year for comprehensive eye exam, or sooner if needed.     Mike Martell, SKIP  [facility] [place]  [address] [place] [address]. [place]  [place]  [address] [phone]

## 2020-07-27 NOTE — PROGRESS NOTES
Chief Complaint   Patient presents with     Annual Eye Exam        Hemoglobin A1C   Date Value Ref Range Status   04/29/2019 7.1 (H) 0 - 5.6 % Final     Comment:     Normal <5.7% Prediabetes 5.7-6.4%  Diabetes 6.5% or higher - adopted from ADA   consensus guidelines.     10/01/2018 7.7 (H) 0 - 5.6 % Final     Comment:     Normal <5.7% Prediabetes 5.7-6.4%  Diabetes 6.5% or higher - adopted from ADA   consensus guidelines.     04/18/2018 6.9 (H) 0 - 5.6 % Final     Comment:     Normal <5.7% Prediabetes 5.7-6.4%  Diabetes 6.5% or higher - adopted from ADA   consensus guidelines.           Last Eye Exam: 10/2018  Dilated Previously: Yes    What are you currently using to see?  glasses    Distance Vision Acuity: Satisfied with vision    Near Vision Acuity: Satisfied with vision while reading  with glasses    Eye Comfort: good  Do you use eye drops? : No  Occupation or Hobbies: Knitting    [unfilled]     Medical, surgical and family histories reviewed and updated 7/27/2020.       OBJECTIVE: See Ophthalmology exam    ASSESSMENT:    ICD-10-CM    1. Encounter for examination of eyes and vision with abnormal findings  Z01.01    2. Type 2 diabetes mellitus without retinopathy (H)  E11.9    3. Pseudophakia  Z96.1    4. Status post YAG capsulotomy of both eyes  Z98.890    5. Myopia of both eyes  H52.13    6. Regular astigmatism of both eyes  H52.223    7. Presbyopia  H52.4       PLAN:    Pam Hartman aware  eye exam results will be sent to Satish Pena.  Patient Instructions   Patient educated on importance of good blood sugar control.  Letter sent to primary care provider with diabetic eye exam report.     Glasses prescription provided today. Mild change.     The effects of the dilating drops last for 4- 6 hours.  You will be more sensitive to light and vision will be blurry up close.  Mydriatic sunglasses were given if needed.    Return in 1 year for comprehensive eye exam, or sooner if needed.     SKIP Boyd  [facility] [place]  [address] [facility]. [place]  [place]  [address]    [phone]

## 2020-07-27 NOTE — LETTER
7/27/2020         RE: Pam Hartman  [address]  [address]  [facility] [address]        Dear Colleague,    Thank you for referring your patient, Pam Hartman, to the [facility]. Please see a copy of my visit note below.    Chief Complaint   Patient presents with     Annual Eye Exam        Hemoglobin A1C   Date Value Ref Range Status   04/29/2019 7.1 (H) 0 - 5.6 % Final     Comment:     Normal <5.7% Prediabetes 5.7-6.4%  Diabetes 6.5% or higher - adopted from ADA   consensus guidelines.     10/01/2018 7.7 (H) 0 - 5.6 % Final     Comment:     Normal <5.7% Prediabetes 5.7-6.4%  Diabetes 6.5% or higher - adopted from ADA   consensus guidelines.     04/18/2018 6.9 (H) 0 - 5.6 % Final     Comment:     Normal <5.7% Prediabetes 5.7-6.4%  Diabetes 6.5% or higher - adopted from ADA   consensus guidelines.           Last Eye Exam: 10/2018  Dilated Previously: Yes    What are you currently using to see?  glasses    Distance Vision Acuity: Satisfied with vision    Near Vision Acuity: Satisfied with vision while reading  with glasses    Eye Comfort: good  Do you use eye drops? : No  Occupation or Hobbies: Eve    [unfilled]     Medical, surgical and family histories reviewed and updated 7/27/2020.       OBJECTIVE: See Ophthalmology exam    ASSESSMENT:    ICD-10-CM    1. Encounter for examination of eyes and vision with abnormal findings  Z01.01    2. Type 2 diabetes mellitus without retinopathy (H)  E11.9    3. Pseudophakia  Z96.1    4. Status post YAG capsulotomy of both eyes  Z98.890    5. Myopia of both eyes  H52.13    6. Regular astigmatism of both eyes  H52.223    7. Presbyopia  H52.4       PLAN:    Pam Hartman aware  eye exam results will be sent to Satish Pena.  Patient Instructions   Patient educated on importance of good blood sugar control.  Letter sent to primary care provider with diabetic eye exam report.     Glasses prescription provided today. Mild change.     The  effects of the dilating drops last for 4- 6 hours.  You will be more sensitive to light and vision will be blurry up close.  Mydriatic sunglasses were given if needed.    Return in 1 year for comprehensive eye exam, or sooner if needed.     Mike Martell, SKIP  [facility]  [address] [place] [address]. [place]  [place]  [address]    [phone]               Again, thank you for allowing me to participate in the care of your patient.        Sincerely,        Mike Martell OD

## 2020-12-13 ENCOUNTER — HEALTH MAINTENANCE LETTER (OUTPATIENT)
Age: 85
End: 2020-12-13

## 2021-02-09 ENCOUNTER — IMMUNIZATION (OUTPATIENT)
Dept: NURSING | Facility: CLINIC | Age: 86
End: 2021-02-09
Payer: MEDICARE

## 2021-02-09 PROCEDURE — 91300 PR COVID VAC PFIZER DIL RECON 30 MCG/0.3 ML IM: CPT

## 2021-02-09 PROCEDURE — 0001A PR COVID VAC PFIZER DIL RECON 30 MCG/0.3 ML IM: CPT

## 2021-03-02 ENCOUNTER — IMMUNIZATION (OUTPATIENT)
Dept: NURSING | Facility: CLINIC | Age: 86
End: 2021-03-02
Attending: FAMILY MEDICINE
Payer: MEDICARE

## 2021-03-02 PROCEDURE — 0002A PR COVID VAC PFIZER DIL RECON 30 MCG/0.3 ML IM: CPT

## 2021-03-02 PROCEDURE — 91300 PR COVID VAC PFIZER DIL RECON 30 MCG/0.3 ML IM: CPT

## 2021-04-27 DIAGNOSIS — E11.9 TYPE 2 DIABETES MELLITUS WITHOUT COMPLICATION, WITHOUT LONG-TERM CURRENT USE OF INSULIN (H): ICD-10-CM

## 2021-04-27 DIAGNOSIS — E78.5 HYPERLIPIDEMIA LDL GOAL <100: ICD-10-CM

## 2021-04-27 RX ORDER — SIMVASTATIN 20 MG
TABLET ORAL
Qty: 45 TABLET | Refills: 3 | Status: SHIPPED | OUTPATIENT
Start: 2021-04-27 | End: 2022-05-05

## 2021-05-03 DIAGNOSIS — E11.9 TYPE 2 DIABETES MELLITUS WITHOUT COMPLICATION, WITHOUT LONG-TERM CURRENT USE OF INSULIN (H): ICD-10-CM

## 2021-05-03 DIAGNOSIS — E78.5 HYPERLIPIDEMIA LDL GOAL <100: ICD-10-CM

## 2021-05-03 DIAGNOSIS — R60.1 GENERALIZED EDEMA: ICD-10-CM

## 2021-05-03 DIAGNOSIS — I10 ESSENTIAL HYPERTENSION WITH GOAL BLOOD PRESSURE LESS THAN 140/90: ICD-10-CM

## 2021-05-03 RX ORDER — LOSARTAN POTASSIUM 100 MG/1
TABLET ORAL
Qty: 90 TABLET | Refills: 3 | Status: SHIPPED | OUTPATIENT
Start: 2021-05-03 | End: 2022-05-05

## 2021-05-03 RX ORDER — FUROSEMIDE 20 MG
TABLET ORAL
Qty: 180 TABLET | Refills: 3 | Status: SHIPPED | OUTPATIENT
Start: 2021-05-03 | End: 2022-05-05

## 2021-05-03 RX ORDER — AMLODIPINE BESYLATE 10 MG/1
TABLET ORAL
Qty: 90 TABLET | Refills: 3 | Status: SHIPPED | OUTPATIENT
Start: 2021-05-03 | End: 2022-05-02

## 2021-05-03 NOTE — TELEPHONE ENCOUNTER
Routing refill request to provider for review/approval because:  BP, labs, and appointment needed          Pending Prescriptions:                       Disp   Refills    metFORMIN (GLUCOPHAGE) 500 MG tablet [Phar*90 tab*3        Sig: TAKE 1 TABLET(500 MG) BY MOUTH DAILY WITH BREAKFAST    losartan (COZAAR) 100 MG tablet [Pharmacy *90 tab*3        Sig: TAKE 1 TABLET(100 MG) BY MOUTH DAILY    amLODIPine (NORVASC) 10 MG tablet [Pharmac*90 tab*3        Sig: TAKE 1 TABLET(10 MG) BY MOUTH DAILY    furosemide (LASIX) 20 MG tablet [Pharmacy *180 ta*3        Sig: TAKE 2 TABLETS BY MOUTH EVERY DAY        Jose Daniel Nevarez RN

## 2021-07-19 ENCOUNTER — ANCILLARY PROCEDURE (OUTPATIENT)
Dept: GENERAL RADIOLOGY | Facility: CLINIC | Age: 86
End: 2021-07-19
Attending: ORTHOPAEDIC SURGERY
Payer: MEDICARE

## 2021-07-19 ENCOUNTER — OFFICE VISIT (OUTPATIENT)
Dept: ORTHOPEDICS | Facility: CLINIC | Age: 86
End: 2021-07-19
Payer: MEDICARE

## 2021-07-19 VITALS
HEIGHT: 59 IN | SYSTOLIC BLOOD PRESSURE: 137 MMHG | WEIGHT: 145 LBS | BODY MASS INDEX: 29.23 KG/M2 | RESPIRATION RATE: 16 BRPM | HEART RATE: 72 BPM | DIASTOLIC BLOOD PRESSURE: 71 MMHG

## 2021-07-19 DIAGNOSIS — M12.811 ROTATOR CUFF TEAR ARTHROPATHY OF RIGHT SHOULDER: ICD-10-CM

## 2021-07-19 DIAGNOSIS — M75.101 ROTATOR CUFF TEAR ARTHROPATHY OF RIGHT SHOULDER: ICD-10-CM

## 2021-07-19 DIAGNOSIS — Z96.611 STATUS POST REVERSE TOTAL REPLACEMENT OF RIGHT SHOULDER: Primary | ICD-10-CM

## 2021-07-19 DIAGNOSIS — Z96.611 STATUS POST REVERSE TOTAL REPLACEMENT OF RIGHT SHOULDER: ICD-10-CM

## 2021-07-19 PROCEDURE — 99213 OFFICE O/P EST LOW 20 MIN: CPT | Performed by: ORTHOPAEDIC SURGERY

## 2021-07-19 PROCEDURE — 73030 X-RAY EXAM OF SHOULDER: CPT | Mod: RT | Performed by: RADIOLOGY

## 2021-07-19 RX ORDER — AMOXICILLIN 500 MG/1
2000 CAPSULE ORAL ONCE
Qty: 8 CAPSULE | Refills: 12 | Status: SHIPPED | OUTPATIENT
Start: 2021-07-19 | End: 2021-07-19

## 2021-07-19 ASSESSMENT — MIFFLIN-ST. JEOR: SCORE: 1013.35

## 2021-07-19 NOTE — PROGRESS NOTES
Pam Hartman is in for routine follow up of right reverse total shoulder arthroplasty on 2017.    She has no complaints.    Past Medical History:   Diagnosis Date     Colon polyp      Degenerative arthritis      DM II (diabetes mellitus, type II)      HTN      Hyperlipidemia LDL goal < 100      IBS (irritable bowel syndrome)      Osteopenia      Psoriasis      Type 2 diabetes mellitus without complication (H) 10/21/2015     Ulnar neuropathy        Past Surgical History:   Procedure Laterality Date     EYE SURGERY  2015    Left and right cataract repair     REVERSE ARTHROPLASTY SHOULDER Right 2017    Nahun Skinner MD at [facility]     SEPTOPLASTY  2014    Procedure: SEPTOPLASTY;  Septoplasty, Left ethmoidectomy, Left endoscopic maxillary antrostomy, right kike bullosa;  Surgeon: Sarah Garcia MD;  Location: [facility] OR     TONSILLECTOMY & ADENOIDECTOMY         Family History   Problem Relation Age of Onset     Macular Degeneration Mother      Cerebrovascular Disease Father      Glaucoma No family hx of        Social History     Socioeconomic History     Marital status:      Spouse name: Not on file     Number of children: Not on file     Years of education: Not on file     Highest education level: Not on file   Occupational History     Not on file   Tobacco Use     Smoking status: Former Smoker     Quit date: 1984     Years since quittin.6     Smokeless tobacco: Never Used   Substance and Sexual Activity     Alcohol use: Yes     Comment: 2 DRINKS PER MONTH     Drug use: No     Sexual activity: Never   Other Topics Concern     Parent/sibling w/ CABG, MI or angioplasty before 65F 55M? No   Social History Narrative     Not on file     Social Determinants of Health     Financial Resource Strain:      Difficulty of Paying Living Expenses:    Food Insecurity:      Worried About Running Out of Food in the Last Year:      Ran Out of Food in the Last Year:    Transportation  Needs:      Lack of Transportation (Medical):      Lack of Transportation (Non-Medical):    Physical Activity:      Days of Exercise per Week:      Minutes of Exercise per Session:    Stress:      Feeling of Stress :    Social Connections:      Frequency of Communication with Friends and Family:      Frequency of Social Gatherings with Friends and Family:      Attends Sikhism Services:      Active Member of Clubs or Organizations:      Attends Club or Organization Meetings:      Marital Status:    Intimate Partner Violence:      Fear of Current or Ex-Partner:      Emotionally Abused:      Physically Abused:      Sexually Abused:        Current Outpatient Medications   Medication Sig Dispense Refill     Acetaminophen (TYLENOL PO) Take 2,000 mg by mouth daily        amLODIPine (NORVASC) 10 MG tablet TAKE 1 TABLET(10 MG) BY MOUTH DAILY 90 tablet 3     amoxicillin (AMOXIL) 500 MG capsule Take 4 capsules (2,000 mg) by mouth once for 1 dose 1 hour prior to procedure. 8 capsule 12     aspirin 81 MG tablet Take 1 tablet by mouth daily.       blood glucose (NO BRAND SPECIFIED) test strip Use to test blood sugars 2 times daily or as directed 100 each 12     blood glucose monitoring (NO BRAND SPECIFIED) meter device kit Use to test blood sugar 2 times daily or as directed. 1 kit 0     calcipotriene (DOVONEX) 0.005 % SOLN solution Apply to scalp BID as needed 1 Bottle 4     calcium carbonate-vitamin D 500-400 MG-UNIT TABS tablt ONE BY MOUTH TWICE A  tablet 3     dicyclomine (BENTYL) 20 MG tablet TAKE 1 TABLET (20 MG) BY MOUTH 4 TIMES DAILY AS NEEDED. 40 tablet 0     furosemide (LASIX) 20 MG tablet TAKE 2 TABLETS BY MOUTH EVERY  tablet 3     losartan (COZAAR) 100 MG tablet TAKE 1 TABLET(100 MG) BY MOUTH DAILY 90 tablet 3     metFORMIN (GLUCOPHAGE) 500 MG tablet TAKE 1 TABLET(500 MG) BY MOUTH DAILY WITH BREAKFAST 90 tablet 3     Probiotic Product (PROBIOTIC PO)        simvastatin (ZOCOR) 20 MG tablet TAKE 1/2  "TABLET BY MOUTH EVERY NIGHT AT BEDTIME 45 tablet 3     thin (NO BRAND SPECIFIED) lancets Use with lanceting device. To accompany: Blood Glucose Monitor Brands: per insurance. 200 each 3       Allergies   Allergen Reactions     Septra [Bactrim]      Sulfa Drugs Rash       REVIEW OF SYSTEMS:  CONSTITUTIONAL:  NEGATIVE for fever, chills, change in weight  INTEGUMENTARY/SKIN:  NEGATIVE for worrisome rashes, moles or lesions  EYES:  NEGATIVE for vision changes or irritation  ENT/MOUTH:  NEGATIVE for ear, mouth and throat problems  RESP:  NEGATIVE for significant cough or SOB  BREAST:  NEGATIVE for masses, tenderness or discharge  CV:  NEGATIVE for chest pain, palpitations or peripheral edema  GI:  NEGATIVE for nausea, abdominal pain, heartburn, or change in bowel habits  :  Negative   MUSCULOSKELETAL:  See HPI above  NEURO:  NEGATIVE for weakness, dizziness or paresthesias  ENDOCRINE:  NEGATIVE for temperature intolerance, skin/hair changes  HEME/ALLERGY/IMMUNE:  NEGATIVE for bleeding problems  PSYCHIATRIC:  NEGATIVE for changes in mood or affect         Xray today shows good position of components.  No sign of loosening or wear.     Exam:    Vitals: /71   Pulse 72   Resp 16   Ht 1.499 m (4' 11\")   Wt 65.8 kg (145 lb)   BMI 29.29 kg/m    BMI= Body mass index is 29.29 kg/m ..  Range of motion:   Left Flexion:  160 degrees.  Right flexion:  140 degrees.  Left external rotation 70 degrees, internal rotation 70 degrees.  No pain.  Right external rotation 60 degrees, internal rotation 60 degrees.  No pain.  Strength:   External rotation:  3/5 on right   Internal rotation: 5/5  Abduction: 4/5    Sensation, motor, and circulation intact.  Well healed scar.  No erythema or increased warmth.      Assessment:  right reverse total shoulder arthroplasty doing well.    Plan:  Activity as tolerated.  Take antibiotics before dental work.  Return to clinic 2-4 years with xray  right shoulder .  "

## 2021-07-19 NOTE — LETTER
2021         RE: Pam Hartman  [address]  [address]  [place] [address]        Dear Colleague,    Thank you for referring your patient, Pam Hartman, to the [facility]. Please see a copy of my visit note below.    Pam Hartman is in for routine follow up of right reverse total shoulder arthroplasty on 2017.    She has no complaints.    Past Medical History:   Diagnosis Date     Colon polyp      Degenerative arthritis      DM II (diabetes mellitus, type II)      HTN      Hyperlipidemia LDL goal < 100      IBS (irritable bowel syndrome)      Osteopenia      Psoriasis      Type 2 diabetes mellitus without complication (H) 10/21/2015     Ulnar neuropathy        Past Surgical History:   Procedure Laterality Date     EYE SURGERY  2015    Left and right cataract repair     REVERSE ARTHROPLASTY SHOULDER Right 2017    Nahun Skinner MD at [facility]     SEPTOPLASTY  2014    Procedure: SEPTOPLASTY;  Septoplasty, Left ethmoidectomy, Left endoscopic maxillary antrostomy, right kike bullosa;  Surgeon: Sarah Garcia MD;  Location: [facility] OR     TONSILLECTOMY & ADENOIDECTOMY         Family History   Problem Relation Age of Onset     Macular Degeneration Mother      Cerebrovascular Disease Father      Glaucoma No family hx of        Social History     Socioeconomic History     Marital status:      Spouse name: Not on file     Number of children: Not on file     Years of education: Not on file     Highest education level: Not on file   Occupational History     Not on file   Tobacco Use     Smoking status: Former Smoker     Quit date: 1984     Years since quittin.6     Smokeless tobacco: Never Used   Substance and Sexual Activity     Alcohol use: Yes     Comment: 2 DRINKS PER MONTH     Drug use: No     Sexual activity: Never   Other Topics Concern     Parent/sibling w/ CABG, MI or angioplasty before 65F 55M? No   Social History  Narrative     Not on file     Social Determinants of Health     Financial Resource Strain:      Difficulty of Paying Living Expenses:    Food Insecurity:      Worried About Running Out of Food in the Last Year:      Ran Out of Food in the Last Year:    Transportation Needs:      Lack of Transportation (Medical):      Lack of Transportation (Non-Medical):    Physical Activity:      Days of Exercise per Week:      Minutes of Exercise per Session:    Stress:      Feeling of Stress :    Social Connections:      Frequency of Communication with Friends and Family:      Frequency of Social Gatherings with Friends and Family:      Attends Hoahaoism Services:      Active Member of Clubs or Organizations:      Attends Club or Organization Meetings:      Marital Status:    Intimate Partner Violence:      Fear of Current or Ex-Partner:      Emotionally Abused:      Physically Abused:      Sexually Abused:        Current Outpatient Medications   Medication Sig Dispense Refill     Acetaminophen (TYLENOL PO) Take 2,000 mg by mouth daily        amLODIPine (NORVASC) 10 MG tablet TAKE 1 TABLET(10 MG) BY MOUTH DAILY 90 tablet 3     amoxicillin (AMOXIL) 500 MG capsule Take 4 capsules (2,000 mg) by mouth once for 1 dose 1 hour prior to procedure. 8 capsule 12     aspirin 81 MG tablet Take 1 tablet by mouth daily.       blood glucose (NO BRAND SPECIFIED) test strip Use to test blood sugars 2 times daily or as directed 100 each 12     blood glucose monitoring (NO BRAND SPECIFIED) meter device kit Use to test blood sugar 2 times daily or as directed. 1 kit 0     calcipotriene (DOVONEX) 0.005 % SOLN solution Apply to scalp BID as needed 1 Bottle 4     calcium carbonate-vitamin D 500-400 MG-UNIT TABS tablt ONE BY MOUTH TWICE A  tablet 3     dicyclomine (BENTYL) 20 MG tablet TAKE 1 TABLET (20 MG) BY MOUTH 4 TIMES DAILY AS NEEDED. 40 tablet 0     furosemide (LASIX) 20 MG tablet TAKE 2 TABLETS BY MOUTH EVERY  tablet 3     losartan  "(COZAAR) 100 MG tablet TAKE 1 TABLET(100 MG) BY MOUTH DAILY 90 tablet 3     metFORMIN (GLUCOPHAGE) 500 MG tablet TAKE 1 TABLET(500 MG) BY MOUTH DAILY WITH BREAKFAST 90 tablet 3     Probiotic Product (PROBIOTIC PO)        simvastatin (ZOCOR) 20 MG tablet TAKE 1/2 TABLET BY MOUTH EVERY NIGHT AT BEDTIME 45 tablet 3     thin (NO BRAND SPECIFIED) lancets Use with lanceting device. To accompany: Blood Glucose Monitor Brands: per insurance. 200 each 3       Allergies   Allergen Reactions     Septra [Bactrim]      Sulfa Drugs Rash       REVIEW OF SYSTEMS:  CONSTITUTIONAL:  NEGATIVE for fever, chills, change in weight  INTEGUMENTARY/SKIN:  NEGATIVE for worrisome rashes, moles or lesions  EYES:  NEGATIVE for vision changes or irritation  ENT/MOUTH:  NEGATIVE for ear, mouth and throat problems  RESP:  NEGATIVE for significant cough or SOB  BREAST:  NEGATIVE for masses, tenderness or discharge  CV:  NEGATIVE for chest pain, palpitations or peripheral edema  GI:  NEGATIVE for nausea, abdominal pain, heartburn, or change in bowel habits  :  Negative   MUSCULOSKELETAL:  See HPI above  NEURO:  NEGATIVE for weakness, dizziness or paresthesias  ENDOCRINE:  NEGATIVE for temperature intolerance, skin/hair changes  HEME/ALLERGY/IMMUNE:  NEGATIVE for bleeding problems  PSYCHIATRIC:  NEGATIVE for changes in mood or affect         Xray today shows good position of components.  No sign of loosening or wear.     Exam:    Vitals: /71   Pulse 72   Resp 16   Ht 1.499 m (4' 11\")   Wt 65.8 kg (145 lb)   BMI 29.29 kg/m    BMI= Body mass index is 29.29 kg/m ..  Range of motion:   Left Flexion:  160 degrees.  Right flexion:  140 degrees.  Left external rotation 70 degrees, internal rotation 70 degrees.  No pain.  Right external rotation 60 degrees, internal rotation 60 degrees.  No pain.  Strength:   External rotation:  3/5 on right   Internal rotation: 5/5  Abduction: 4/5    Sensation, motor, and circulation intact.  Well healed " scar.  No erythema or increased warmth.      Assessment:  right reverse total shoulder arthroplasty doing well.    Plan:  Activity as tolerated.  Take antibiotics before dental work.  Return to clinic 2-4 years with xray  right shoulder .      Again, thank you for allowing me to participate in the care of your patient.        Sincerely,        Nahun Skinner MD

## 2021-07-19 NOTE — PATIENT INSTRUCTIONS
Activity as tolerated.  Take antibiotics before dental work.  Return to clinic 2-4 years with xray  right shoulder .

## 2021-08-01 ENCOUNTER — HEALTH MAINTENANCE LETTER (OUTPATIENT)
Age: 86
End: 2021-08-01

## 2021-09-26 ENCOUNTER — HEALTH MAINTENANCE LETTER (OUTPATIENT)
Age: 86
End: 2021-09-26

## 2021-09-27 ENCOUNTER — LAB (OUTPATIENT)
Dept: URGENT CARE | Facility: URGENT CARE | Age: 86
End: 2021-09-27
Attending: FAMILY MEDICINE
Payer: MEDICARE

## 2021-09-27 ENCOUNTER — VIRTUAL VISIT (OUTPATIENT)
Dept: FAMILY MEDICINE | Facility: CLINIC | Age: 86
End: 2021-09-27
Payer: MEDICARE

## 2021-09-27 DIAGNOSIS — Z20.822 EXPOSURE TO COVID-19 VIRUS: ICD-10-CM

## 2021-09-27 DIAGNOSIS — Z20.822 EXPOSURE TO COVID-19 VIRUS: Primary | ICD-10-CM

## 2021-09-27 PROCEDURE — 99000 SPECIMEN HANDLING OFFICE-LAB: CPT

## 2021-09-27 PROCEDURE — U0003 INFECTIOUS AGENT DETECTION BY NUCLEIC ACID (DNA OR RNA); SEVERE ACUTE RESPIRATORY SYNDROME CORONAVIRUS 2 (SARS-COV-2) (CORONAVIRUS DISEASE [COVID-19]), AMPLIFIED PROBE TECHNIQUE, MAKING USE OF HIGH THROUGHPUT TECHNOLOGIES AS DESCRIBED BY CMS-2020-01-R: HCPCS | Mod: 90

## 2021-09-27 PROCEDURE — 99441 PR PHYSICIAN TELEPHONE EVALUATION 5-10 MIN: CPT | Mod: 95 | Performed by: FAMILY MEDICINE

## 2021-09-27 NOTE — PROGRESS NOTES
Pam is a 86 year old who is being evaluated via a billable telephone visit.      What phone number would you like to be contacted at? [phone]  How would you like to obtain your AVS? MyChart     ==================================================================    Assessment & Plan     Exposure to COVID-19 virus  - Asymptomatic COVID-19 Virus (Coronavirus) by PCR; Future    Lucy Genao, [facility]    ===================================================================    Subjective   Pam is a 86 year old who presents for the following health issues     HPI     Concern for COVID-19  About how many days ago did these symptoms start? Does not have symptoms  Is this your first visit for this illness? Yes  In the 14 days before your symptoms started, have you had close contact with someone with COVID-19 (Coronavirus)? Yes, I have been in contact with someone who has COVID-19/Coronavirus (confirmed by lab test).  was hospitalized yesterday.  Do you have a fever or chills? No  Are you having new or worsening difficulty breathing? No  Do you have new or worsening cough? No  Have you had any new or unexplained body aches? No    Have you experienced any of the following NEW symptoms?    Headache: No    Sore throat: No    Loss of taste or smell: No    Chest pain: No    Diarrhea: No    Rash: No  What treatments have you tried? None  Who do you live with?   Are you, or a household member, a healthcare worker or a ? No  Do you live in a nursing home, group home, or shelter? No  Do you have a way to get food/medications if quarantined? Yes      Review of Systems   Constitutional, HEENT, cardiovascular, pulmonary, gi and gu systems are negative, except as otherwise noted.      Objective       Vitals:  No vitals were obtained today due to virtual visit.    Physical Exam   healthy, alert and no distress  PSYCH: Alert and oriented times 3; coherent speech, normal    rate and volume, able to articulate logical thoughts, able   to abstract reason, no tangential thoughts, no hallucinations   or delusions  Her affect is normal  RESP: No cough, no audible wheezing, able to talk in full sentences  Remainder of exam unable to be completed due to telephone visits          Phone call duration: 5 minutes

## 2021-09-28 ENCOUNTER — TELEPHONE (OUTPATIENT)
Dept: FAMILY MEDICINE | Facility: CLINIC | Age: 86
End: 2021-09-28

## 2021-09-28 NOTE — TELEPHONE ENCOUNTER
Coronavirus (COVID-19) Notification     Reason for call  Patient requesting results     Lab Result    Lab test 2019-nCoV rRt-PCR in process        RN Recommendations/Instructions per [facility]  Continue quarantee and following instructions until you receive the results     Please Contact your PCP clinic or return to the Emergency department if your:    Symptoms worsen or other concerning symptom's.     Patient informed that if test for COVID19 is POSITIVE,  you will receive a call typically within 48 hours from the test date (date lab collected).  If NEGATIVE result, you will receive a letter in the mail or [place].      Linda Martinez LPN

## 2021-09-30 LAB — SARS-COV-2 RNA RESP QL NAA+PROBE: NOT DETECTED

## 2021-10-04 ENCOUNTER — LAB (OUTPATIENT)
Dept: LAB | Facility: CLINIC | Age: 86
End: 2021-10-04
Attending: PHYSICIAN ASSISTANT
Payer: MEDICARE

## 2021-10-04 ENCOUNTER — VIRTUAL VISIT (OUTPATIENT)
Dept: FAMILY MEDICINE | Facility: CLINIC | Age: 86
End: 2021-10-04
Payer: MEDICARE

## 2021-10-04 DIAGNOSIS — Z20.822 EXPOSURE TO 2019 NOVEL CORONAVIRUS: Primary | ICD-10-CM

## 2021-10-04 DIAGNOSIS — Z20.822 EXPOSURE TO 2019 NOVEL CORONAVIRUS: ICD-10-CM

## 2021-10-04 PROCEDURE — U0003 INFECTIOUS AGENT DETECTION BY NUCLEIC ACID (DNA OR RNA); SEVERE ACUTE RESPIRATORY SYNDROME CORONAVIRUS 2 (SARS-COV-2) (CORONAVIRUS DISEASE [COVID-19]), AMPLIFIED PROBE TECHNIQUE, MAKING USE OF HIGH THROUGHPUT TECHNOLOGIES AS DESCRIBED BY CMS-2020-01-R: HCPCS

## 2021-10-04 PROCEDURE — 99441 PR PHYSICIAN TELEPHONE EVALUATION 5-10 MIN: CPT | Mod: 95 | Performed by: PHYSICIAN ASSISTANT

## 2021-10-04 PROCEDURE — U0005 INFEC AGEN DETEC AMPLI PROBE: HCPCS

## 2021-10-04 NOTE — PROGRESS NOTES
"Pam is a 86 year old who is being evaluated via a billable telephone visit.    10:20-10:27  What phone number would you like to be contacted at? [phone]   How would you like to obtain your AVS? MyChart    Assessment & Plan     Exposure to 2019 novel coronavirus  Follow up when results are back.  If negative can end quarantine   - Asymptomatic COVID-19 Virus (Coronavirus) by PCR Nasopharyngeal; Future             BMI:   Estimated body mass index is 29.29 kg/m  as calculated from the following:    Height as of 7/19/21: 1.499 m (4' 11\").    Weight as of 7/19/21: 65.8 kg (145 lb).           No follow-ups on file.    MARC Mckenzie [facility]    René Orozco is a 86 year old who presents for the following health issues     HPI     Acute Illness  Acute illness concerns: patient is requesting a second Covid test( first test came back negative on 9-27-21)  positive with Covid. Patient doesn,t have any symptoms.  Onset/Duration:   Symptoms:  Fever: no  Chills/Sweats: no  Headache (location?): no  Sinus Pressure: no  Conjunctivitis:  no  Ear Pain: no  Rhinorrhea: yes  Congestion: no  Sore Throat: no  Cough: no  Wheeze: no  Decreased Appetite: no  Nausea: no  Vomiting: no  Diarrhea: no  Dysuria/Freq.: no  Dysuria or Hematuria: no  Fatigue/Achiness: no  Sick/Strep Exposure:  positive with Covid   Therapies tried and outcome: None  suggested she have another test  9/24 her  started having symptoms.  Positive on 9/26.  Can't remember if had runny nose at the time of testing.         Review of Systems   As above      Objective           Vitals:  No vitals were obtained today due to virtual visit.    Physical Exam   healthy, alert and no distress  PSYCH: Alert and oriented times 3; coherent speech, normal   rate and volume, able to articulate logical thoughts, able   to abstract reason, no tangential thoughts, no hallucinations   or delusions  Her affect is " normal  RESP: No cough, no audible wheezing, able to talk in full sentences  Remainder of exam unable to be completed due to telephone visits                Phone call duration: 7 minutes

## 2021-10-05 LAB — SARS-COV-2 RNA RESP QL NAA+PROBE: NEGATIVE

## 2021-10-21 ENCOUNTER — NURSE TRIAGE (OUTPATIENT)
Dept: FAMILY MEDICINE | Facility: CLINIC | Age: 86
End: 2021-10-21

## 2021-10-21 ENCOUNTER — VIRTUAL VISIT (OUTPATIENT)
Dept: FAMILY MEDICINE | Facility: CLINIC | Age: 86
End: 2021-10-21
Payer: MEDICARE

## 2021-10-21 ENCOUNTER — LAB (OUTPATIENT)
Dept: LAB | Facility: CLINIC | Age: 86
End: 2021-10-21
Payer: MEDICARE

## 2021-10-21 DIAGNOSIS — R30.0 DYSURIA: Primary | ICD-10-CM

## 2021-10-21 DIAGNOSIS — R30.0 DYSURIA: ICD-10-CM

## 2021-10-21 LAB
ALBUMIN UR-MCNC: NEGATIVE MG/DL
APPEARANCE UR: CLEAR
BACTERIA #/AREA URNS HPF: ABNORMAL /HPF
BILIRUB UR QL STRIP: NEGATIVE
COLOR UR AUTO: YELLOW
GLUCOSE UR STRIP-MCNC: NEGATIVE MG/DL
HGB UR QL STRIP: NEGATIVE
HYALINE CASTS #/AREA URNS LPF: ABNORMAL /LPF
KETONES UR STRIP-MCNC: ABNORMAL MG/DL
LEUKOCYTE ESTERASE UR QL STRIP: ABNORMAL
NITRATE UR QL: NEGATIVE
PH UR STRIP: 5.5 [PH] (ref 5–7)
RBC #/AREA URNS AUTO: ABNORMAL /HPF
SP GR UR STRIP: 1.01 (ref 1–1.03)
SQUAMOUS #/AREA URNS AUTO: ABNORMAL /LPF
UROBILINOGEN UR STRIP-ACNC: 0.2 E.U./DL
WBC #/AREA URNS AUTO: ABNORMAL /HPF

## 2021-10-21 PROCEDURE — 87086 URINE CULTURE/COLONY COUNT: CPT

## 2021-10-21 PROCEDURE — 81001 URINALYSIS AUTO W/SCOPE: CPT

## 2021-10-21 PROCEDURE — 99442 PR PHYSICIAN TELEPHONE EVALUATION 11-20 MIN: CPT | Mod: 95 | Performed by: STUDENT IN AN ORGANIZED HEALTH CARE EDUCATION/TRAINING PROGRAM

## 2021-10-21 NOTE — TELEPHONE ENCOUNTER
Symptoms started this am. Has urgency and strange sensation to urinate. Has pressure when trying to void. Hasn't had a bladder infection for years, but remembers what it feels like. No fever or chills. No back pain. No hematuria.     Reason for Disposition    Age > 50 years    Additional Information    Negative: Shock suspected (e.g., cold/pale/clammy skin, too weak to stand, low BP, rapid pulse)    Negative: Sounds like a life-threatening emergency to the triager    Negative: Unable to urinate (or only a few drops) and bladder feels very full    Negative: Vomiting    Negative: Patient sounds very sick or weak to the triager    Negative: SEVERE pain with urination    Negative: Fever > 100.4 F (38.0 C)    Negative: Side (flank) or lower back pain present    Negative: Patient is worried about sexually transmitted disease (STD)    Negative: > 2 UTIs in last year    Negative: Unusual vaginal discharge    Negative: Taking antibiotic > 3 days for UTI and painful urination not improved    Negative: Taking antibiotic > 24 hours for UTI and fever persists    Protocols used: URINATION PAIN - FEMALE-A-OH

## 2021-10-21 NOTE — TELEPHONE ENCOUNTER
Patient calling to request medication for bladder infection.  Please call patient today.  OK to LM on VM.    Patient uses [facility] in [place]

## 2021-10-21 NOTE — PROGRESS NOTES
yenifer is a 86 year old who is being evaluated via a billable telephone visit.      What phone number would you like to be contacted at? [phone]  How would you like to obtain your AVS? [place]    Assessment & Plan     Dysuria  Symptoms primarily occurred this morning, resolved this afternoon. Discussed that this may be due to dehydration given that she had increased physical activity yesterday and her symptoms only occurred in the morning today. Recommend still obtaining a UA however to evaluate for UTI, she will schedule a lab appt. Recommend increasing water intake today to help her symptoms. I will be in touch when I get the labs back  - UA Macro with Reflex to Micro and Culture - lab collect; Future    Return if symptoms worsen or fail to improve.    Corina Gallardo, [facility]   yenifer is a 86 year old who presents for the following health issues     HPI     Genitourinary - Female  Onset/Duration: since 8 am this morning   Description:   Painful urination (Dysuria): YES- mild and pressure            Frequency: no  Blood in urine (Hematuria): no  Delay in urine (Hesitency): YES- not going as often   Intensity: moderate  Progression of Symptoms:  same  Accompanying Signs & Symptoms:  Fever/chills: no  Flank pain: no  Nausea and vomiting: no  Vaginal symptoms: none  Abdominal/Pelvic Pain: no  History:   History of frequent UTI s: no  History of kidney stones: no  Sexually Active: no  Possibility of pregnancy: No  Precipitating or alleviating factors: None  Therapies tried and outcome: none      This morning she thought she had a bladder infection. She hasn't had one in 20-30 years. Her first void this morning felt normal. Then second void was an hour later, she had urinary urgency and dysuria and felt uncomfortable. She kept feeling like she needed to void but couldn't. Then this afternoon, she has been able to void normally without hesitancy without dysuria. She did  catch some of her urine and it looks cloudy. Denies hematuria, no perineal itching.     She isn't great at drinking water. Approximately 2 glasses of water per day. Drinks 2 glasses green tea daily. No alcohol use. She limits how much fluids she drinks after supper because of nocturia.    Yesterday, she did do more strenuous activity than normal. They were packing an moving their cabin so she was lifting more than normal.     Review of Systems   Constitutional, HEENT, cardiovascular, pulmonary, gi and gu systems are negative, except as otherwise noted.      Objective           Vitals:  No vitals were obtained today due to virtual visit.    Physical Exam   healthy, alert and no distress  PSYCH: Alert and oriented times 3; coherent speech, normal   rate and volume, able to articulate logical thoughts, able   to abstract reason, no tangential thoughts, no hallucinations   or delusions  Her affect is normal  RESP: No cough, no audible wheezing, able to talk in full sentences  Remainder of exam unable to be completed due to telephone visits          Phone call duration: 14 minutes

## 2021-10-21 NOTE — PATIENT INSTRUCTIONS
Call to schedule a lab appointment to give a urine sample. I will be in touch when I see the results. Increase your water intake today.       Patient Education     Dysuria  Dysuria is when you have pain during urination. It is often described as a burning feeling. Learn more about this problem and how it can be treated.     Painful urination (dysuria) is often caused by a problem in the urinary tract.   What causes dysuria?  Possible causes include:    Infection with a bacteria or virus such as a urinary tract infection (UTI) or a sexually transmitted infection (STI)    Sensitivity or allergy to chemicals such as those found in lotions and other products    Prostate or bladder problems    Radiation therapy to the pelvic area  How is dysuria diagnosed?  Your healthcare provider will examine you. He or she will ask about your symptoms and health. After talking with you and doing a physical exam, your healthcare provider may know what is causing your dysuria. You will often have to give a urine sample. Tests of your urine (urinalysis) are done. A urinalysis may include:    Looking at the urine sample (visual exam)    Checking for substances (chemical exam)    Looking at a small amount of the urine under a microscope (microscopic exam)  Some parts of the urinalysis may be done in the provider's office and some in a lab. The urine sample may also be checked for bacteria and yeast (urine culture). Your healthcare provider will tell you more about these tests if they are needed.  How is dysuria treated?  Treatment depends on the cause. If you have a bacterial infection, you may need antibiotics. You may be given medicines to make it easier for you to urinate and help ease pain. Your healthcare provider can tell you more about your treatment options. If not treated, symptoms may get worse.  When to call your healthcare provider  Call the healthcare provider right away if you have any of the following:    Fever of  100.4   F ( 38 C) or higher, or as directed by your provider    No improvement after 3 days of treatment    Trouble urinating because of pain    New or increased discharge from the vagina or penis    Rash or joint pain    Increased back or belly pain    Enlarged painful lymph nodes (lumps) in the groin  [employer] last reviewed this educational content on 4/1/2019 2000-2021 [employer]. All rights reserved. This information is not intended as a substitute for professional medical care. Always follow your healthcare professional's instructions.

## 2021-10-23 LAB — BACTERIA UR CULT: NORMAL

## 2021-11-01 ENCOUNTER — LAB (OUTPATIENT)
Dept: LAB | Facility: CLINIC | Age: 86
End: 2021-11-01
Payer: MEDICARE

## 2021-11-01 ENCOUNTER — DOCUMENTATION ONLY (OUTPATIENT)
Dept: LAB | Facility: CLINIC | Age: 86
End: 2021-11-01

## 2021-11-01 DIAGNOSIS — E11.9 TYPE 2 DIABETES, HBA1C GOAL < 8% (H): ICD-10-CM

## 2021-11-01 DIAGNOSIS — E78.5 HYPERLIPIDEMIA LDL GOAL <100: ICD-10-CM

## 2021-11-01 DIAGNOSIS — I10 HTN, GOAL BELOW 150/90: Primary | ICD-10-CM

## 2021-11-01 LAB
ALBUMIN SERPL-MCNC: 4.2 G/DL (ref 3.4–5)
ALP SERPL-CCNC: 93 U/L (ref 40–150)
ALT SERPL W P-5'-P-CCNC: 28 U/L (ref 0–50)
ANION GAP SERPL CALCULATED.3IONS-SCNC: 3 MMOL/L (ref 3–14)
AST SERPL W P-5'-P-CCNC: 15 U/L (ref 0–45)
BILIRUB SERPL-MCNC: 0.6 MG/DL (ref 0.2–1.3)
BUN SERPL-MCNC: 27 MG/DL (ref 7–30)
CALCIUM SERPL-MCNC: 10.2 MG/DL (ref 8.5–10.1)
CHLORIDE BLD-SCNC: 105 MMOL/L (ref 94–109)
CHOLEST SERPL-MCNC: 157 MG/DL
CO2 SERPL-SCNC: 32 MMOL/L (ref 20–32)
CREAT SERPL-MCNC: 1.17 MG/DL (ref 0.52–1.04)
CREAT UR-MCNC: 94 MG/DL
FASTING STATUS PATIENT QL REPORTED: YES
GFR SERPL CREATININE-BSD FRML MDRD: 43 ML/MIN/1.73M2
GLUCOSE BLD-MCNC: 116 MG/DL (ref 70–99)
HBA1C MFR BLD: 6.8 % (ref 0–5.6)
HDLC SERPL-MCNC: 51 MG/DL
HOLD SPECIMEN: NORMAL
LDLC SERPL CALC-MCNC: 78 MG/DL
MICROALBUMIN UR-MCNC: 16 MG/L
MICROALBUMIN/CREAT UR: 17.02 MG/G CR (ref 0–25)
NONHDLC SERPL-MCNC: 106 MG/DL
POTASSIUM BLD-SCNC: 4.2 MMOL/L (ref 3.4–5.3)
PROT SERPL-MCNC: 8.1 G/DL (ref 6.8–8.8)
SODIUM SERPL-SCNC: 140 MMOL/L (ref 133–144)
TRIGL SERPL-MCNC: 142 MG/DL

## 2021-11-01 PROCEDURE — 80053 COMPREHEN METABOLIC PANEL: CPT

## 2021-11-01 PROCEDURE — 83036 HEMOGLOBIN GLYCOSYLATED A1C: CPT

## 2021-11-01 PROCEDURE — 36415 COLL VENOUS BLD VENIPUNCTURE: CPT

## 2021-11-01 PROCEDURE — 82043 UR ALBUMIN QUANTITATIVE: CPT

## 2021-11-01 PROCEDURE — 80061 LIPID PANEL: CPT

## 2021-11-01 NOTE — PROGRESS NOTES
Patient came into day 11/01/2021 to have labs drawn but no orders I did draw a rainbow and urine could you please place orders   Sheila Solomon on 11/1/2021 at 2:24 PM

## 2021-11-09 ENCOUNTER — OFFICE VISIT (OUTPATIENT)
Dept: FAMILY MEDICINE | Facility: CLINIC | Age: 86
End: 2021-11-09
Payer: MEDICARE

## 2021-11-09 VITALS
BODY MASS INDEX: 28.88 KG/M2 | OXYGEN SATURATION: 98 % | TEMPERATURE: 97.7 F | WEIGHT: 143 LBS | DIASTOLIC BLOOD PRESSURE: 74 MMHG | SYSTOLIC BLOOD PRESSURE: 138 MMHG | HEART RATE: 88 BPM

## 2021-11-09 DIAGNOSIS — E11.9 TYPE 2 DIABETES MELLITUS WITHOUT COMPLICATION, WITHOUT LONG-TERM CURRENT USE OF INSULIN (H): Primary | ICD-10-CM

## 2021-11-09 DIAGNOSIS — L40.9 PSORIASIS: ICD-10-CM

## 2021-11-09 DIAGNOSIS — I10 HTN, GOAL BELOW 150/90: ICD-10-CM

## 2021-11-09 PROCEDURE — 99213 OFFICE O/P EST LOW 20 MIN: CPT | Performed by: INTERNAL MEDICINE

## 2021-11-09 NOTE — PROGRESS NOTES
"  Assessment & Plan   Problem List Items Addressed This Visit     HTN, goal below 150/90    Psoriasis    Type 2 diabetes mellitus without complication (H) - Primary    Relevant Orders    OPTOMETRY REFERRAL    Adult Dermatology Referral         BP     138/74  11/11/2021    Lab Results   Component Value Date     11/01/2021     04/29/2019     Lab Results   Component Value Date    A1C 6.8 11/01/2021    A1C 7.1 04/29/2019     Lab Results   Component Value Date    LDL 78 11/01/2021    LDL 68 04/29/2019     Lab Results   Component Value Date    MICROL 16 11/01/2021    MICROL 10 04/29/2019     No results found for: MICROALBUMIN           BMI:   Estimated body mass index is 28.88 kg/m  as calculated from the following:    Height as of 7/19/21: 1.499 m (4' 11\").    Weight as of this encounter: 64.9 kg (143 lb).   Weight management plan: Discussed healthy diet and exercise guidelines    Work on weight loss  Regular exercise    No follow-ups on file.    Satish Pena MD  [facility] [address] is a 86 year old who presents for the following health issues  accompanied by her self.    [facility]     Diabetes Follow-up  122-142/ 75    How often are you checking your blood sugar? A few times a month  What time of day are you checking your blood sugars (select all that apply)?  Before meals  Have you had any blood sugars above 200?  No  Have you had any blood sugars below 70?  No    What symptoms do you notice when your blood sugar is low?  None    What concerns do you have today about your diabetes? None     Do you have any of these symptoms? (Select all that apply)  No numbness or tingling in feet.  No redness, sores or blisters on feet.  No complaints of excessive thirst.  No reports of blurry vision.  No significant changes to weight.    Have you had a diabetic eye exam in the last 12 months? Yes- Date of last eye exam: 7/27/2021,  Location: [place]        BP Readings from Last 2 " Encounters:   11/09/21 138/74   07/19/21 137/71     Hemoglobin A1C (%)   Date Value   11/01/2021 6.8 (H)   04/29/2019 7.1 (H)   10/01/2018 7.7 (H)     LDL Cholesterol Calculated (mg/dL)   Date Value   11/01/2021 78   04/29/2019 68   04/18/2018 66           How many servings of fruits and vegetables do you eat daily?  4 or more    On average, how many sweetened beverages do you drink each day (Examples: soda, juice, sweet tea, etc.  Do NOT count diet or artificially sweetened beverages)?   0    How many days per week do you exercise enough to make your heart beat faster? 3 or less    How many minutes a day do you exercise enough to make your heart beat faster? 9 or less    How many days per week do you miss taking your medication? 0        Review of Systems   Constitutional, HEENT, cardiovascular, pulmonary, gi and gu systems are negative, except as otherwise noted.      Objective    /74   Pulse 88   Temp 97.7  F (36.5  C) (Oral)   Wt 64.9 kg (143 lb)   SpO2 98%   BMI 28.88 kg/m    Body mass index is 28.88 kg/m .  Physical Exam   GENERAL: healthy, alert and no distress  EYES: Eyes grossly normal to inspection, PERRL and conjunctivae and sclerae normal  HENT: ear canals and TM's normal, nose and mouth without ulcers or lesions  NECK: no adenopathy, no asymmetry, masses, or scars and thyroid normal to palpation  RESP: lungs clear to auscultation - no rales, rhonchi or wheezes  CV: regular rate and rhythm, normal S1 S2, no S3 or S4, no murmur, click or rub, no peripheral edema and peripheral pulses strong  ABDOMEN: soft, nontender, no hepatosplenomegaly, no masses and bowel sounds normal  MS: no gross musculoskeletal defects noted, no edema  SKIN: no suspicious lesions or rashes  NEURO: Normal strength and tone, mentation intact and speech normal  BACK: no CVA tenderness, no paralumbar tenderness  PSYCH: mentation appears normal, affect normal/bright  LYMPH: no cervical, supraclavicular, axillary, or inguinal  adenopathy    No results found for any visits on 11/09/21.

## 2021-11-12 ENCOUNTER — OFFICE VISIT (OUTPATIENT)
Dept: OPHTHALMOLOGY | Facility: CLINIC | Age: 86
End: 2021-11-12
Payer: MEDICARE

## 2021-11-12 DIAGNOSIS — H52.223 MYOPIA OF BOTH EYES WITH REGULAR ASTIGMATISM AND PRESBYOPIA: ICD-10-CM

## 2021-11-12 DIAGNOSIS — H52.13 MYOPIA OF BOTH EYES WITH REGULAR ASTIGMATISM AND PRESBYOPIA: ICD-10-CM

## 2021-11-12 DIAGNOSIS — H43.811 POSTERIOR VITREOUS DETACHMENT OF RIGHT EYE: ICD-10-CM

## 2021-11-12 DIAGNOSIS — H52.4 MYOPIA OF BOTH EYES WITH REGULAR ASTIGMATISM AND PRESBYOPIA: ICD-10-CM

## 2021-11-12 DIAGNOSIS — Z96.1 PSEUDOPHAKIA: ICD-10-CM

## 2021-11-12 DIAGNOSIS — E11.9 TYPE 2 DIABETES MELLITUS WITHOUT COMPLICATION, WITHOUT LONG-TERM CURRENT USE OF INSULIN (H): Primary | ICD-10-CM

## 2021-11-12 DIAGNOSIS — Z01.00 EXAMINATION OF EYES AND VISION: ICD-10-CM

## 2021-11-12 PROCEDURE — 92015 DETERMINE REFRACTIVE STATE: CPT | Mod: GY | Performed by: STUDENT IN AN ORGANIZED HEALTH CARE EDUCATION/TRAINING PROGRAM

## 2021-11-12 PROCEDURE — 92004 COMPRE OPH EXAM NEW PT 1/>: CPT | Performed by: STUDENT IN AN ORGANIZED HEALTH CARE EDUCATION/TRAINING PROGRAM

## 2021-11-12 ASSESSMENT — REFRACTION_MANIFEST
OD_CYLINDER: +3.00
OS_ADD: +3.00
OS_SPHERE: -0.50
OD_SPHERE: -1.00
OS_CYLINDER: +2.25
OD_ADD: +3.00
OS_AXIS: 165
OD_AXIS: 010

## 2021-11-12 ASSESSMENT — REFRACTION_WEARINGRX
OS_SPHERE: -0.50
OD_ADD: +2.75
OS_CYLINDER: +2.25
OS_AXIS: 165
OD_CYLINDER: +2.75
SPECS_TYPE: PAL
OD_AXIS: 005
OD_SPHERE: -0.75
OS_ADD: +2.75

## 2021-11-12 ASSESSMENT — VISUAL ACUITY
OS_CC: 20/20-2
METHOD: SNELLEN - LINEAR
CORRECTION_TYPE: GLASSES
OD_CC: 20/25

## 2021-11-12 ASSESSMENT — SLIT LAMP EXAM - LIDS
COMMENTS: NORMAL
COMMENTS: NORMAL

## 2021-11-12 ASSESSMENT — TONOMETRY
OS_IOP_MMHG: 18
IOP_METHOD: APPLANATION
OD_IOP_MMHG: 18

## 2021-11-12 ASSESSMENT — CONF VISUAL FIELD
OD_NORMAL: 1
OS_NORMAL: 1

## 2021-11-12 ASSESSMENT — EXTERNAL EXAM - LEFT EYE: OS_EXAM: NORMAL

## 2021-11-12 ASSESSMENT — CUP TO DISC RATIO
OD_RATIO: 0.35
OS_RATIO: 0.35

## 2021-11-12 ASSESSMENT — EXTERNAL EXAM - RIGHT EYE: OD_EXAM: NORMAL

## 2021-11-12 NOTE — PATIENT INSTRUCTIONS
Glasses prescription given - optional to update    Keep blood sugars and blood pressure under good control.    Mariann Willingham MD  [phone]    Patient Education   Diabetes weakens the blood vessels all over the body, including the eyes. Damage to the blood vessels in the eyes can cause swelling or bleeding into part of the eye (called the retina). This is called diabetic retinopathy (DANA-tin--puh-thee). If not treated, this disease can cause vision loss or blindness.   Symptoms may include blurred or distorted vision, but many people have no symptoms. It's important to see your eye doctor regularly to check for problems.   Early treatment and good control can help protect your vision. Here are the things you can do to help prevent vision loss:      1. Keep your blood sugar levels under tight control.      2. Bring high blood pressure under control.      3. No smoking.      4. Have yearly dilated eye exams.

## 2021-11-12 NOTE — LETTER
11/12/2021         RE: Pam Hartman  [address]  [place] [address]        Dear Colleague,    Thank you for referring your patient, Pam Hartman, to the [facility]. Please see a copy of my visit note below.     Current Eye Medications: no      Subjective:  Comprehensive/diabetic eye exam.   Pt reports that she is seeing well. No eye pain or discomfort.    Lab Results   Component Value Date    A1C 6.8 11/01/2021    A1C 7.1 04/29/2019    A1C 7.7 10/01/2018    A1C 6.9 04/18/2018    A1C 7.6 11/28/2016    A1C 7.6 07/11/2016      Objective:  See Ophthalmology Exam.      Assessment:  Pam Hartman is a 86 year old female who presents with:   Encounter Diagnoses   Name Primary?     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    Mariann Willingham MD  [phone]                      Again, thank you for allowing me to participate in the care of your patient.        Sincerely,        Mariann Willingham MD

## 2022-01-16 ENCOUNTER — HEALTH MAINTENANCE LETTER (OUTPATIENT)
Age: 87
End: 2022-01-16

## 2022-04-07 ENCOUNTER — ALLIED HEALTH/NURSE VISIT (OUTPATIENT)
Dept: FAMILY MEDICINE | Facility: CLINIC | Age: 87
End: 2022-04-07
Payer: MEDICARE

## 2022-04-07 DIAGNOSIS — Z23 HIGH PRIORITY FOR 2019-NCOV VACCINE: Primary | ICD-10-CM

## 2022-04-07 PROCEDURE — 0054A COVID-19,PF,PFIZER (12+ YRS): CPT

## 2022-04-07 PROCEDURE — 99207 PR NO CHARGE LOS: CPT

## 2022-04-07 PROCEDURE — 91305 COVID-19,PF,PFIZER (12+ YRS): CPT

## 2022-04-07 NOTE — PROGRESS NOTES
Prior to injection, verified patient identity using patient's name and date of birth. Due to injection administration, patient instructed to remain in clinic for 15 minutes afterwards, and to report any adverse reaction to me immediately.  Jazzy Weems MA

## 2022-04-29 DIAGNOSIS — E78.5 HYPERLIPIDEMIA LDL GOAL <100: ICD-10-CM

## 2022-05-02 DIAGNOSIS — E11.9 TYPE 2 DIABETES MELLITUS WITHOUT COMPLICATION, WITHOUT LONG-TERM CURRENT USE OF INSULIN (H): ICD-10-CM

## 2022-05-02 DIAGNOSIS — R60.1 GENERALIZED EDEMA: ICD-10-CM

## 2022-05-02 DIAGNOSIS — I10 ESSENTIAL HYPERTENSION WITH GOAL BLOOD PRESSURE LESS THAN 140/90: ICD-10-CM

## 2022-05-02 DIAGNOSIS — E78.5 HYPERLIPIDEMIA LDL GOAL <100: ICD-10-CM

## 2022-05-02 RX ORDER — AMLODIPINE BESYLATE 10 MG/1
TABLET ORAL
Qty: 90 TABLET | Refills: 3 | Status: SHIPPED | OUTPATIENT
Start: 2022-05-02 | End: 2023-04-24

## 2022-05-02 NOTE — TELEPHONE ENCOUNTER
"Routing refill request to provider for review/approval because:  Labs out of range:  Cr    Requested Prescriptions   Pending Prescriptions Disp Refills    amLODIPine (NORVASC) 10 MG tablet [Pharmacy Med Name: AMLODIPINE BESYLATE 10MG TABLETS] 90 tablet 3     Sig: TAKE 1 TABLET(10 MG) BY MOUTH DAILY        Calcium Channel Blockers Protocol  Failed - 4/29/2022  3:27 AM        Failed - Normal serum creatinine on file in past 12 months     Recent Labs   Lab Test 11/01/21  1502   CR 1.17*       Ok to refill medication if creatinine is low          Passed - Blood pressure under 140/90 in past 12 months       BP Readings from Last 3 Encounters:   11/09/21 138/74   07/19/21 137/71   07/15/19 137/59                 Passed - Recent (12 mo) or future (30 days) visit within the authorizing provider's specialty     Patient has had an office visit with the authorizing provider or a provider within the authorizing providers department within the previous 12 mos or has a future within next 30 days. See \"Patient Info\" tab in inbasket, or \"Choose Columns\" in Meds & Orders section of the refill encounter.              Passed - Medication is active on med list        Passed - Patient is age 18 or older        Passed - No active pregnancy on record        Passed - No positive pregnancy test in past 12 months              Antonio Ames RN  [facility]    "

## 2022-05-04 NOTE — TELEPHONE ENCOUNTER
Routing refill request to provider for review/approval because:  Labs out of range:    Creatinine   Date Value Ref Range Status   11/01/2021 1.17 (H) 0.52 - 1.04 mg/dL Final   04/29/2019 0.90 0.52 - 1.04 mg/dL Final

## 2022-05-05 RX ORDER — LOSARTAN POTASSIUM 100 MG/1
TABLET ORAL
Qty: 90 TABLET | Refills: 3 | Status: SHIPPED | OUTPATIENT
Start: 2022-05-05 | End: 2023-04-24

## 2022-05-05 RX ORDER — FUROSEMIDE 20 MG
TABLET ORAL
Qty: 180 TABLET | Refills: 3 | Status: SHIPPED | OUTPATIENT
Start: 2022-05-05 | End: 2023-04-24

## 2022-05-05 RX ORDER — SIMVASTATIN 20 MG
TABLET ORAL
Qty: 45 TABLET | Refills: 3 | Status: SHIPPED | OUTPATIENT
Start: 2022-05-05 | End: 2023-04-24

## 2022-05-08 ENCOUNTER — HEALTH MAINTENANCE LETTER (OUTPATIENT)
Age: 87
End: 2022-05-08

## 2022-10-05 ENCOUNTER — ALLIED HEALTH/NURSE VISIT (OUTPATIENT)
Dept: FAMILY MEDICINE | Facility: CLINIC | Age: 87
End: 2022-10-05
Payer: MEDICARE

## 2022-10-05 DIAGNOSIS — Z23 HIGH PRIORITY FOR 2019-NCOV VACCINE: Primary | ICD-10-CM

## 2022-10-05 PROCEDURE — 0124A COVID-19,PF,PFIZER BOOSTER BIVALENT: CPT

## 2022-10-05 PROCEDURE — 91312 COVID-19,PF,PFIZER BOOSTER BIVALENT: CPT

## 2022-12-20 NOTE — PROGRESS NOTES
CERTIFICATE OF SCHOOL EXCUSE    12/20/2022      Re: Jason Leonard        [address]  [facility] [address]      This is to certify that Jason Leonard has been under my care on 12/20/2022.  Please excuse Jason from school on this day to make his appointment.    Thank You,         SIGNATURE:___________________________________________,   12/20/2022  Serene Hutchins MD        [facility]  [address] [address] [phone]   [facility]  Subjective:    HPI                    Objective:    System    Physical Exam    General     ROS    Assessment/Plan:      DISCHARGE REPORT    Progress reporting period is from 8/8/17 to 12/20/17.       SUBJECTIVE  Subjective: Overall, patient feeling her right shoulder is 90% improved. Has fallen off of performing her HEP in the past month due to dealing with her husbands illness and having to take on increased duties/activities at home. Able to perform all ADL's as needed, even able to reach back to tie her apron, just off to the right side instead of th center. Remains unable to sleep with her right UE under the pillow as it hurts too much.     Current pain level is 0/10  .     Initial Pain level: 4/10.   Changes in function:  Yes (See Goal flowsheet attached for changes in current functional level)  Adverse reaction to treatment or activity: None    OBJECTIVE  Changes noted in objective findings:  Yes, overall, improved AROM of right shoulder: flexion 135, abduction 132, ER 65, IR/ext central L5. PROM slightly > AROM. Mid range MMT of right shoulder: flexion and abduction 4+/5, IR 4+/5, ER 3-/5. Tightness and tenderness along right UT muscle. No tenderness remains along anterior right shoulder scar. Patient is able to reproduce HEP. Improved SPADI score from 71% initially to 22% currently.     ASSESSMENT/PLAN  Updated problem list and treatment plan: Diagnosis 1:  S/p right shoulder reverse TSA  Decreased ROM/flexibility - home program  Decreased strength - home program  Decreased function - home program  STG/LTGs have been met or progress has been made towards goals:  Yes (See Goal flow sheet completed today.)  Assessment of Progress: The patient's progress has plateaued.  Self Management Plans:  Patient is independent in a home treatment program.  Patient is independent in self management of symptoms.  I have re-evaluated this patient and find that the nature, scope,  duration and intensity of the therapy is appropriate for the medical condition of the patient.  Pam continues to require the following intervention to meet STG and LTG's:  PT intervention is no longer required to meet STG/LTG.    Recommendations:  This patient is ready to be discharged from therapy and continue their home treatment program.    Please refer to the daily flowsheet for treatment today, total treatment time and time spent performing 1:1 timed codes.

## 2023-01-08 ENCOUNTER — HEALTH MAINTENANCE LETTER (OUTPATIENT)
Age: 88
End: 2023-01-08

## 2023-03-09 ENCOUNTER — OFFICE VISIT (OUTPATIENT)
Dept: OPHTHALMOLOGY | Facility: CLINIC | Age: 88
End: 2023-03-09
Payer: MEDICARE

## 2023-03-09 DIAGNOSIS — H52.4 MYOPIA OF BOTH EYES WITH REGULAR ASTIGMATISM AND PRESBYOPIA: ICD-10-CM

## 2023-03-09 DIAGNOSIS — H52.223 MYOPIA OF BOTH EYES WITH REGULAR ASTIGMATISM AND PRESBYOPIA: ICD-10-CM

## 2023-03-09 DIAGNOSIS — H52.13 MYOPIA OF BOTH EYES WITH REGULAR ASTIGMATISM AND PRESBYOPIA: ICD-10-CM

## 2023-03-09 DIAGNOSIS — E11.9 TYPE 2 DIABETES MELLITUS WITHOUT COMPLICATION, WITHOUT LONG-TERM CURRENT USE OF INSULIN (H): ICD-10-CM

## 2023-03-09 DIAGNOSIS — Z01.00 EXAMINATION OF EYES AND VISION: Primary | ICD-10-CM

## 2023-03-09 DIAGNOSIS — H43.811 POSTERIOR VITREOUS DETACHMENT OF RIGHT EYE: ICD-10-CM

## 2023-03-09 DIAGNOSIS — Z96.1 PSEUDOPHAKIA: ICD-10-CM

## 2023-03-09 PROCEDURE — 92014 COMPRE OPH EXAM EST PT 1/>: CPT | Performed by: STUDENT IN AN ORGANIZED HEALTH CARE EDUCATION/TRAINING PROGRAM

## 2023-03-09 PROCEDURE — 92015 DETERMINE REFRACTIVE STATE: CPT | Mod: GY | Performed by: STUDENT IN AN ORGANIZED HEALTH CARE EDUCATION/TRAINING PROGRAM

## 2023-03-09 ASSESSMENT — CONF VISUAL FIELD
OS_INFERIOR_NASAL_RESTRICTION: 0
OD_SUPERIOR_TEMPORAL_RESTRICTION: 0
OS_INFERIOR_TEMPORAL_RESTRICTION: 0
OS_NORMAL: 1
OS_SUPERIOR_NASAL_RESTRICTION: 0
OS_SUPERIOR_TEMPORAL_RESTRICTION: 0
OD_INFERIOR_NASAL_RESTRICTION: 0
OD_NORMAL: 1
OD_SUPERIOR_NASAL_RESTRICTION: 0
OD_INFERIOR_TEMPORAL_RESTRICTION: 0

## 2023-03-09 ASSESSMENT — VISUAL ACUITY
CORRECTION_TYPE: GLASSES
METHOD: SNELLEN - LINEAR
OD_CC: 1+
OS_CC: 20/25
OS_CC+: +1
OS_CC: 1
OD_CC: 20/30
OD_CC+: -1

## 2023-03-09 ASSESSMENT — REFRACTION_WEARINGRX
OD_CYLINDER: +2.50
OS_AXIS: 159
SPECS_TYPE: PAL
OD_SPHERE: -0.75
OS_SPHERE: -0.75
OS_CYLINDER: +2.25
OD_AXIS: 008
OD_ADD: +2.75
OS_ADD: +2.75

## 2023-03-09 ASSESSMENT — REFRACTION_MANIFEST
OS_SPHERE: -0.75
OD_AXIS: 015
OS_CYLINDER: +2.50
OD_SPHERE: -1.50
OS_AXIS: 160
OS_ADD: +2.75
OD_CYLINDER: +2.75
OD_ADD: +2.75

## 2023-03-09 ASSESSMENT — SLIT LAMP EXAM - LIDS
COMMENTS: NORMAL
COMMENTS: NORMAL

## 2023-03-09 ASSESSMENT — CUP TO DISC RATIO
OS_RATIO: 0.35
OD_RATIO: 0.35

## 2023-03-09 ASSESSMENT — EXTERNAL EXAM - RIGHT EYE: OD_EXAM: NORMAL

## 2023-03-09 ASSESSMENT — TONOMETRY
IOP_METHOD: APPLANATION
OS_IOP_MMHG: 17
OD_IOP_MMHG: 16

## 2023-03-09 ASSESSMENT — EXTERNAL EXAM - LEFT EYE: OS_EXAM: NORMAL

## 2023-03-09 NOTE — LETTER
3/9/2023         RE: Pam Hartman  [address]  [place] [address]        Dear Colleague,    Thank you for referring your patient, Pam Hartman, to the [facility]. Please see a copy of my visit note below.     Current Eye Medications:  None.      Subjective:  Patient is here for a Diabetic Eye Exam.  Patient complains of slight decrease in distance vision in each eye, but especially in her right eye.   She feels her glasses prescription needs updating.   Lab Results   Component Value Date    A1C 6.8 11/01/2021    A1C 7.1 04/29/2019    A1C 7.7 10/01/2018    A1C 6.9 04/18/2018    A1C 7.6 11/28/2016    A1C 7.6 07/11/2016        Objective:  See Ophthalmology Exam.       Assessment:  Pam Hartman is a 88 year old female who presents with:   Encounter Diagnoses   Name Primary?     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    Glasses prescription given     Mariann Willingham MD  [phone]    Patient Education  Diabetes weakens the blood vessels all over the body, including the eyes. Damage to the blood vessels in the eyes can cause swelling or bleeding into part of the eye (called the retina). This is called diabetic retinopathy (DANA-tin-AH-puh-thee). If not treated, this disease can cause vision loss or blindness.   Symptoms may include blurred or distorted vision, but many people have no symptoms. It's important to see your eye doctor regularly to check for problems.   Early treatment and good control can help protect your vision. Here are the things you can do to help prevent vision loss:      1. Keep your blood sugar  levels under tight control.      2. Bring high blood pressure under control.      3. No smoking.      4. Have yearly dilated eye exams.            Again, thank you for allowing me to participate in the care of your patient.        Sincerely,        Mariann Willingham MD

## 2023-03-09 NOTE — PATIENT INSTRUCTIONS
Keep blood sugars and blood pressure under good control.    Glasses prescription given     Mariann Willingham MD  [phone]    Patient Education   Diabetes weakens the blood vessels all over the body, including the eyes. Damage to the blood vessels in the eyes can cause swelling or bleeding into part of the eye (called the retina). This is called diabetic retinopathy (DANA-tin-AH-puh-thee). If not treated, this disease can cause vision loss or blindness.   Symptoms may include blurred or distorted vision, but many people have no symptoms. It's important to see your eye doctor regularly to check for problems.   Early treatment and good control can help protect your vision. Here are the things you can do to help prevent vision loss:      1. Keep your blood sugar levels under tight control.      2. Bring high blood pressure under control.      3. No smoking.      4. Have yearly dilated eye exams.

## 2023-03-09 NOTE — PROGRESS NOTES
Current Eye Medications:  None.      Subjective:  Patient is here for a Diabetic Eye Exam.  Patient complains of slight decrease in distance vision in each eye, but especially in her right eye.   She feels her glasses prescription needs updating.   Lab Results   Component Value Date    A1C 6.8 11/01/2021    A1C 7.1 04/29/2019    A1C 7.7 10/01/2018    A1C 6.9 04/18/2018    A1C 7.6 11/28/2016    A1C 7.6 07/11/2016        Objective:  See Ophthalmology Exam.       Assessment:  Pam Hartman is a 88 year old female who presents with:   Encounter Diagnoses   Name Primary?     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    Glasses prescription given     Mariann Willingham MD  [phone]    Patient Education  Diabetes weakens the blood vessels all over the body, including the eyes. Damage to the blood vessels in the eyes can cause swelling or bleeding into part of the eye (called the retina). This is called diabetic retinopathy (DANA-tin-AH-puh-thee). If not treated, this disease can cause vision loss or blindness.   Symptoms may include blurred or distorted vision, but many people have no symptoms. It's important to see your eye doctor regularly to check for problems.   Early treatment and good control can help protect your vision. Here are the things you can do to help prevent vision loss:      1. Keep your blood sugar levels under tight control.      2. Bring high blood pressure under control.      3. No smoking.      4. Have yearly dilated eye exams.

## 2023-04-23 ENCOUNTER — HEALTH MAINTENANCE LETTER (OUTPATIENT)
Age: 88
End: 2023-04-23

## 2023-04-23 DIAGNOSIS — I10 ESSENTIAL HYPERTENSION WITH GOAL BLOOD PRESSURE LESS THAN 140/90: ICD-10-CM

## 2023-04-23 DIAGNOSIS — R60.1 GENERALIZED EDEMA: ICD-10-CM

## 2023-04-23 DIAGNOSIS — E78.5 HYPERLIPIDEMIA LDL GOAL <100: ICD-10-CM

## 2023-04-23 DIAGNOSIS — E11.9 TYPE 2 DIABETES MELLITUS WITHOUT COMPLICATION, WITHOUT LONG-TERM CURRENT USE OF INSULIN (H): ICD-10-CM

## 2023-04-24 RX ORDER — SIMVASTATIN 20 MG
TABLET ORAL
Qty: 45 TABLET | Refills: 3 | Status: SHIPPED | OUTPATIENT
Start: 2023-04-24 | End: 2024-04-17

## 2023-04-24 RX ORDER — AMLODIPINE BESYLATE 10 MG/1
TABLET ORAL
Qty: 90 TABLET | Refills: 3 | Status: SHIPPED | OUTPATIENT
Start: 2023-04-24 | End: 2024-04-17

## 2023-04-24 RX ORDER — FUROSEMIDE 20 MG
TABLET ORAL
Qty: 180 TABLET | Refills: 3 | Status: SHIPPED | OUTPATIENT
Start: 2023-04-24 | End: 2024-04-17

## 2023-04-24 RX ORDER — LOSARTAN POTASSIUM 100 MG/1
TABLET ORAL
Qty: 90 TABLET | Refills: 3 | Status: SHIPPED | OUTPATIENT
Start: 2023-04-24 | End: 2024-04-17

## 2023-06-01 ENCOUNTER — OFFICE VISIT (OUTPATIENT)
Dept: FAMILY MEDICINE | Facility: CLINIC | Age: 88
End: 2023-06-01
Payer: MEDICARE

## 2023-06-01 VITALS
SYSTOLIC BLOOD PRESSURE: 122 MMHG | BODY MASS INDEX: 28.87 KG/M2 | WEIGHT: 143.2 LBS | TEMPERATURE: 97 F | HEART RATE: 77 BPM | OXYGEN SATURATION: 99 % | HEIGHT: 59 IN | DIASTOLIC BLOOD PRESSURE: 70 MMHG

## 2023-06-01 DIAGNOSIS — E55.9 VITAMIN D DEFICIENCY: Primary | ICD-10-CM

## 2023-06-01 DIAGNOSIS — Z23 NEED FOR DIPHTHERIA-TETANUS-PERTUSSIS (TDAP) VACCINE: ICD-10-CM

## 2023-06-01 DIAGNOSIS — I10 HTN, GOAL BELOW 150/90: ICD-10-CM

## 2023-06-01 DIAGNOSIS — H90.6 MIXED CONDUCTIVE AND SENSORINEURAL HEARING LOSS OF BOTH EARS: ICD-10-CM

## 2023-06-01 DIAGNOSIS — E11.9 TYPE 2 DIABETES MELLITUS WITHOUT COMPLICATION, WITHOUT LONG-TERM CURRENT USE OF INSULIN (H): ICD-10-CM

## 2023-06-01 DIAGNOSIS — K52.9 CHRONIC DIARRHEA: ICD-10-CM

## 2023-06-01 LAB — HBA1C MFR BLD: 6.6 % (ref 0–5.6)

## 2023-06-01 PROCEDURE — 83036 HEMOGLOBIN GLYCOSYLATED A1C: CPT | Performed by: INTERNAL MEDICINE

## 2023-06-01 PROCEDURE — G0439 PPPS, SUBSEQ VISIT: HCPCS | Performed by: INTERNAL MEDICINE

## 2023-06-01 PROCEDURE — 84443 ASSAY THYROID STIM HORMONE: CPT | Performed by: INTERNAL MEDICINE

## 2023-06-01 PROCEDURE — 82570 ASSAY OF URINE CREATININE: CPT | Performed by: INTERNAL MEDICINE

## 2023-06-01 PROCEDURE — 91312 COVID-19 BIVALENT 12+ (PFIZER): CPT | Performed by: INTERNAL MEDICINE

## 2023-06-01 PROCEDURE — 82043 UR ALBUMIN QUANTITATIVE: CPT | Performed by: INTERNAL MEDICINE

## 2023-06-01 PROCEDURE — 80061 LIPID PANEL: CPT | Performed by: INTERNAL MEDICINE

## 2023-06-01 PROCEDURE — 36415 COLL VENOUS BLD VENIPUNCTURE: CPT | Performed by: INTERNAL MEDICINE

## 2023-06-01 PROCEDURE — 82306 VITAMIN D 25 HYDROXY: CPT | Performed by: INTERNAL MEDICINE

## 2023-06-01 PROCEDURE — 0124A COVID-19 BIVALENT 12+ (PFIZER): CPT | Performed by: INTERNAL MEDICINE

## 2023-06-01 PROCEDURE — 80048 BASIC METABOLIC PNL TOTAL CA: CPT | Performed by: INTERNAL MEDICINE

## 2023-06-01 ASSESSMENT — ENCOUNTER SYMPTOMS
DIARRHEA: 1
HEMATURIA: 0
EYE PAIN: 0
DIZZINESS: 0
HEMATOCHEZIA: 0
CHILLS: 0
CONSTIPATION: 0
ABDOMINAL PAIN: 0
NERVOUS/ANXIOUS: 0
COUGH: 0

## 2023-06-01 ASSESSMENT — ACTIVITIES OF DAILY LIVING (ADL): CURRENT_FUNCTION: NO ASSISTANCE NEEDED

## 2023-06-01 NOTE — PROGRESS NOTES
"SUBJECTIVE:   yenifer is a 88 year old who presents for Preventive Visit.      6/1/2023     3:17 PM   Additional Questions   Roomed by Melani WALDEN CMA         6/1/2023     3:17 PM   Patient Reported Additional Medications   Patient reports taking the following new medications None     Are you in the first 12 months of your Medicare coverage?  No    Healthy Habits:     In general, how would you rate your overall health?  Good    Frequency of exercise:  None    Do you usually eat at least 4 servings of fruit and vegetables a day, include whole grains    & fiber and avoid regularly eating high fat or \"junk\" foods?  Yes    Taking medications regularly:  Yes    Barriers to taking medications:  None    Medication side effects:  None    Ability to successfully perform activities of daily living:  No assistance needed    Home Safety:  No safety concerns identified    Hearing Impairment:  Difficulty following a conversation in a noisy restaurant or crowded room    In the past 6 months, have you been bothered by leaking of urine? Yes    In general, how would you rate your overall mental or emotional health?  Excellent      PHQ-2 Total Score: 0    Additional concerns today:  No        Have you ever done Advance Care Planning? (For example, a Health Directive, POLST, or a discussion with a medical provider or your loved ones about your wishes): Yes, advance care planning is on file.       Fall risk  Fallen 2 or more times in the past year?: No  Any fall with injury in the past year?: No    Cognitive Screening   1) Repeat 3 items (Leader, Season, Table)    2) Clock draw: NORMAL  3) 3 item recall: Recalls 3 objects  Results: 3 items recalled: COGNITIVE IMPAIRMENT LESS LIKELY    Mini-CogTM Copyright JUVENAL Saini. Licensed by the author for use in [facility]; reprinted with permission (stella@.[facility]). All rights reserved.      Do you have sleep apnea, excessive snoring or daytime drowsiness?: no    Reviewed and updated as needed " this visit by clinical staff   Tobacco  Allergies  Meds              Reviewed and updated as needed this visit by Provider                 Social History     Tobacco Use     Smoking status: Former     Packs/day: 0.50     Years: 16.00     Pack years: 8.00     Types: Cigarettes     Quit date: 1980     Years since quittin.5     Smokeless tobacco: Never   Vaping Use     Vaping status: Not on file   Substance Use Topics     Alcohol use: Not Currently     Comment: 2 DRINKS PER MONTH             2023     2:50 PM   Alcohol Use   Prescreen: >3 drinks/day or >7 drinks/week? Not Applicable     Do you have a current opioid prescription? No  Do you use any other controlled substances or medications that are not prescribed by a provider? None          -------------------------------------    Current providers sharing in care for this patient include:   Patient Care Team:  Satish Pena MD as PCP - General  Mariann Willingham MD as Assigned Surgical Provider  Satish Pena MD as Assigned PCP    The following health maintenance items are reviewed in Epic and correct as of today:  Health Maintenance   Topic Date Due     DTAP/TDAP/TD IMMUNIZATION (2 - Td or Tdap) 2019     MEDICARE ANNUAL WELLNESS VISIT  2020     DIABETIC FOOT EXAM  2020     A1C  2023     EYE EXAM  2024     BMP  2024     LIPID  2024     MICROALBUMIN  2024     ANNUAL REVIEW OF HM ORDERS  2024     FALL RISK ASSESSMENT  2024     ADVANCE CARE PLANNING  2028     PHQ-2 (once per calendar year)  Completed     INFLUENZA VACCINE  Completed     Pneumococcal Vaccine: 65+ Years  Completed     ZOSTER IMMUNIZATION  Completed     COVID-19 Vaccine  Completed     IPV IMMUNIZATION  Aged Out     MENINGITIS IMMUNIZATION  Aged Out     MAMMO SCREENING  Discontinued     Lab work is in process  Labs reviewed in EPIC  BP Readings from Last 3 Encounters:   23 122/70   21 138/74   21  137/71    Wt Readings from Last 3 Encounters:   23 65 kg (143 lb 3.2 oz)   21 64.9 kg (143 lb)   21 65.8 kg (145 lb)                  Patient Active Problem List   Diagnosis     HYPERLIPIDEMIA LDL GOAL <100     Psoriasis     IBS (irritable bowel syndrome)     Health Care Home     ASCUS on Pap smear     Advanced directives, counseling/discussion     Type 2 diabetes, HbA1C goal < 8% (H)     Rotator cuff tear arthropathy     Chronic maxillary sinusitis     HTN, goal below 150/90     Type 2 diabetes mellitus without complication (H)     Past Surgical History:   Procedure Laterality Date     EYE SURGERY  2015    Left and right cataract repair     REVERSE ARTHROPLASTY SHOULDER Right 2017    Nahun Skinner MD at [facility]     SEPTOPLASTY  2014    Procedure: SEPTOPLASTY;  Septoplasty, Left ethmoidectomy, Left endoscopic maxillary antrostomy, right kike bullosa;  Surgeon: Sarah Garcia MD;  Location:  OR     TONSILLECTOMY & ADENOIDECTOMY         Social History     Tobacco Use     Smoking status: Former     Packs/day: 0.50     Years: 16.00     Pack years: 8.00     Types: Cigarettes     Quit date: 1980     Years since quittin.5     Smokeless tobacco: Never   Vaping Use     Vaping status: Not on file   Substance Use Topics     Alcohol use: Not Currently     Comment: 2 DRINKS PER MONTH     Family History   Problem Relation Age of Onset     Macular Degeneration Mother      Cerebrovascular Disease Father      Glaucoma No family hx of          Current Outpatient Medications   Medication Sig Dispense Refill     Acetaminophen (TYLENOL PO) Take 2,000 mg by mouth daily        amLODIPine (NORVASC) 10 MG tablet TAKE 1 TABLET(10 MG) BY MOUTH DAILY 90 tablet 3     aspirin 81 MG tablet Take 1 tablet by mouth daily.       blood glucose (NO BRAND SPECIFIED) test strip Use to test blood sugars 2 times daily or as directed 100 each 12     blood glucose monitoring (NO BRAND SPECIFIED)  "meter device kit Use to test blood sugar 2 times daily or as directed. 1 kit 0     calcipotriene (DOVONEX) 0.005 % SOLN solution Apply to scalp BID as needed 1 Bottle 4     calcium carbonate-vitamin D 500-400 MG-UNIT TABS tablt ONE BY MOUTH TWICE A  tablet 3     furosemide (LASIX) 20 MG tablet TAKE 2 TABLETS BY MOUTH EVERY  tablet 3     losartan (COZAAR) 100 MG tablet TAKE 1 TABLET(100 MG) BY MOUTH DAILY 90 tablet 3     metFORMIN (GLUCOPHAGE) 500 MG tablet TAKE 1 TABLET(500 MG) BY MOUTH DAILY WITH BREAKFAST 90 tablet 3     Probiotic Product (PROBIOTIC PO)        simvastatin (ZOCOR) 20 MG tablet TAKE 1/2 TABLET BY MOUTH EVERY NIGHT AT BEDTIME 45 tablet 3     thin (NO BRAND SPECIFIED) lancets Use with lanceting device. To accompany: Blood Glucose Monitor Brands: per insurance. 200 each 3     Allergies   Allergen Reactions     Septra [Bactrim]      Sulfa Antibiotics Rash         Mammogram Screening - Patient over age 75, has elected to continue with screening.  Pertinent mammograms are reviewed under the imaging tab.    Review of Systems   Constitutional: Negative for chills.   HENT: Negative for congestion and ear pain.    Eyes: Negative for pain.   Respiratory: Negative for cough.    Cardiovascular: Negative for chest pain.   Gastrointestinal: Positive for diarrhea. Negative for abdominal pain, constipation and hematochezia.   Genitourinary: Negative for hematuria.   Neurological: Negative for dizziness.   Psychiatric/Behavioral: The patient is not nervous/anxious.      Constitutional, HEENT, cardiovascular, pulmonary, gi and gu systems are negative, except as otherwise noted.    OBJECTIVE:   /70   Pulse 77   Temp 97  F (36.1  C) (Temporal)   Ht 1.494 m (4' 10.82\")   Wt 65 kg (143 lb 3.2 oz)   SpO2 99%   BMI 29.10 kg/m   Estimated body mass index is 29.1 kg/m  as calculated from the following:    Height as of this encounter: 1.494 m (4' 10.82\").    Weight as of this encounter: 65 kg (143 lb " 3.2 oz).  Physical Exam  GENERAL: healthy, alert and no distress  EYES: Eyes grossly normal to inspection, PERRL and conjunctivae and sclerae normal  HENT: ear canals and TM's normal, nose and mouth without ulcers or lesions  NECK: no adenopathy, no asymmetry, masses, or scars and thyroid normal to palpation  RESP: lungs clear to auscultation - no rales, rhonchi or wheezes  CV: regular rate and rhythm, normal S1 S2, no S3 or S4, no murmur, click or rub, no peripheral edema and peripheral pulses strong  ABDOMEN: soft, nontender, no hepatosplenomegaly, no masses and bowel sounds normal  MS: no gross musculoskeletal defects noted, no edema  SKIN: no suspicious lesions or rashes  NEURO: Normal strength and tone, mentation intact and speech normal  BACK: no CVA tenderness, no paralumbar tenderness  PSYCH: mentation appears normal, affect normal/bright  LYMPH: no cervical, supraclavicular, axillary, or inguinal adenopathy    Diagnostic Test Results:  Labs reviewed in Epic  Results for orders placed or performed in visit on 06/01/23   HEMOGLOBIN A1C     Status: Abnormal   Result Value Ref Range    Hemoglobin A1C 6.6 (H) 0.0 - 5.6 %   BASIC METABOLIC PANEL     Status: Abnormal   Result Value Ref Range    Sodium 141 136 - 145 mmol/L    Potassium 4.5 3.4 - 5.3 mmol/L    Chloride 102 98 - 107 mmol/L    Carbon Dioxide (CO2) 27 22 - 29 mmol/L    Anion Gap 12 7 - 15 mmol/L    Urea Nitrogen 20.2 8.0 - 23.0 mg/dL    Creatinine 1.02 (H) 0.51 - 0.95 mg/dL    Calcium 10.0 8.8 - 10.2 mg/dL    Glucose 101 (H) 70 - 99 mg/dL    GFR Estimate 53 (L) >60 mL/min/1.73m2   Lipid panel reflex to direct LDL Non-fasting     Status: Abnormal   Result Value Ref Range    Cholesterol 154 <200 mg/dL    Triglycerides 241 (H) <150 mg/dL    Direct Measure HDL 44 (L) >=50 mg/dL    LDL Cholesterol Calculated 62 <=100 mg/dL    Non HDL Cholesterol 110 <130 mg/dL    Narrative    Cholesterol  Desirable:  <200 mg/dL    Triglycerides  Normal:  Less than 150  mg/dL  Borderline High:  150-199 mg/dL  High:  200-499 mg/dL  Very High:  Greater than or equal to 500 mg/dL    Direct Measure HDL  Female:  Greater than or equal to 50 mg/dL   Male:  Greater than or equal to 40 mg/dL    LDL Cholesterol  Desirable:  <100mg/dL  Above Desirable:  100-129 mg/dL   Borderline High:  130-159 mg/dL   High:  160-189 mg/dL   Very High:  >= 190 mg/dL    Non HDL Cholesterol  Desirable:  130 mg/dL  Above Desirable:  130-159 mg/dL  Borderline High:  160-189 mg/dL  High:  190-219 mg/dL  Very High:  Greater than or equal to 220 mg/dL   Albumin Random Urine Quantitative with Creat Ratio     Status: Abnormal   Result Value Ref Range    Creatinine Urine mg/dL 66.7 mg/dL    Albumin Urine mg/L 53.3 mg/L    Albumin Urine mg/g Cr 79.91 (H) 0.00 - 25.00 mg/g Cr   TSH with free T4 reflex     Status: Normal   Result Value Ref Range    TSH 1.13 0.30 - 4.20 uIU/mL   Vitamin D Deficiency     Status: Normal   Result Value Ref Range    Vitamin D, Total (25-Hydroxy) 28 20 - 75 ug/L    Narrative    Season, race, dietary intake, and treatment affect the concentration of 25-hydroxy-Vitamin D. Values may decrease during winter months and increase during summer months. Values 20-29 ug/L may indicate Vitamin D insufficiency and values <20 ug/L may indicate Vitamin D deficiency.    Vitamin D determination is routinely performed by an immunoassay specific for 25 hydroxyvitamin D3.  If an individual is on vitamin D2(ergocalciferol) supplementation, please specify 25 OH vitamin D2 and D3 level determination by LCMSMS test VITD23.         ASSESSMENT / PLAN:   Pam was seen today for wellness visit.    Diagnoses and all orders for this visit:    Vitamin D deficiency  -     Vitamin D Deficiency; Future  -     Vitamin D Deficiency    Need for diphtheria-tetanus-pertussis (Tdap) vaccine  -     REVIEW OF HEALTH MAINTENANCE PROTOCOL ORDERS  -     Tdap, tetanus-diptheria-acell pertussis, (BOOSTRIX) 5-2.5-18.5 LF-MCG/0.5 JESENIA  "injection; Inject 0.5 mLs into the muscle once for 1 dose  -     COVID-19 BIVALENT 12+ (PFIZER)    Type 2 diabetes mellitus without complication, without long-term current use of insulin (H)  -     HEMOGLOBIN A1C; Future  -     Lipid panel reflex to direct LDL Non-fasting; Future  -     Albumin Random Urine Quantitative with Creat Ratio; Future  -     TSH with free T4 reflex; Future  -     HEMOGLOBIN A1C  -     Lipid panel reflex to direct LDL Non-fasting  -     Albumin Random Urine Quantitative with Creat Ratio  -     TSH with free T4 reflex    HTN, goal below 150/90  -     BASIC METABOLIC PANEL; Future  -     BASIC METABOLIC PANEL    Mixed conductive and sensorineural hearing loss of both ears  -     Adult Audiology  Referral; Future    Chronic diarrhea  -     Elastase Fecal; Future  -     Elastase Fecal              COUNSELING:  Reviewed preventive health counseling, as reflected in patient instructions       Regular exercise       Healthy diet/nutrition       Vision screening       Hearing screening       Colon cancer screening      BMI:   Estimated body mass index is 29.1 kg/m  as calculated from the following:    Height as of this encounter: 1.494 m (4' 10.82\").    Weight as of this encounter: 65 kg (143 lb 3.2 oz).   Weight management plan: Discussed healthy diet and exercise guidelines      She reports that she quit smoking about 42 years ago. Her smoking use included cigarettes. She has a 8.00 pack-year smoking history. She has never used smokeless tobacco.      Appropriate preventive services were discussed with this patient, including applicable screening as appropriate for cardiovascular disease, diabetes, osteopenia/osteoporosis, and glaucoma.  As appropriate for age/gender, discussed screening for colorectal cancer, prostate cancer, breast cancer, and cervical cancer. Checklist reviewing preventive services available has been given to the patient.    Reviewed patients plan of care and provided " an AVS. The Basic Care Plan (routine screening as documented in Health Maintenance) for Pam meets the Care Plan requirement. This Care Plan has been established and reviewed with the Patient.          Satish Pena MD  [facility]    Identified Health Risks:    I have reviewed Opioid Use Disorder and Substance Use Disorder risk factors and made any needed referrals.

## 2023-06-02 ENCOUNTER — APPOINTMENT (OUTPATIENT)
Dept: LAB | Facility: CLINIC | Age: 88
End: 2023-06-02
Payer: MEDICARE

## 2023-06-02 LAB
ANION GAP SERPL CALCULATED.3IONS-SCNC: 12 MMOL/L (ref 7–15)
BUN SERPL-MCNC: 20.2 MG/DL (ref 8–23)
CALCIUM SERPL-MCNC: 10 MG/DL (ref 8.8–10.2)
CHLORIDE SERPL-SCNC: 102 MMOL/L (ref 98–107)
CHOLEST SERPL-MCNC: 154 MG/DL
CREAT SERPL-MCNC: 1.02 MG/DL (ref 0.51–0.95)
CREAT UR-MCNC: 66.7 MG/DL
DEPRECATED CALCIDIOL+CALCIFEROL SERPL-MC: 28 UG/L (ref 20–75)
DEPRECATED HCO3 PLAS-SCNC: 27 MMOL/L (ref 22–29)
GFR SERPL CREATININE-BSD FRML MDRD: 53 ML/MIN/1.73M2
GLUCOSE SERPL-MCNC: 101 MG/DL (ref 70–99)
HDLC SERPL-MCNC: 44 MG/DL
LDLC SERPL CALC-MCNC: 62 MG/DL
MICROALBUMIN UR-MCNC: 53.3 MG/L
MICROALBUMIN/CREAT UR: 79.91 MG/G CR (ref 0–25)
NONHDLC SERPL-MCNC: 110 MG/DL
POTASSIUM SERPL-SCNC: 4.5 MMOL/L (ref 3.4–5.3)
SODIUM SERPL-SCNC: 141 MMOL/L (ref 136–145)
TRIGL SERPL-MCNC: 241 MG/DL
TSH SERPL DL<=0.005 MIU/L-ACNC: 1.13 UIU/ML (ref 0.3–4.2)

## 2023-06-02 PROCEDURE — 82653 EL-1 FECAL QUANTITATIVE: CPT | Performed by: INTERNAL MEDICINE

## 2023-06-07 LAB — ELASTASE PANC STL-MCNT: 293 UG/G

## 2023-06-16 ENCOUNTER — OFFICE VISIT (OUTPATIENT)
Dept: AUDIOLOGY | Facility: CLINIC | Age: 88
End: 2023-06-16
Payer: MEDICARE

## 2023-06-16 DIAGNOSIS — H90.3 SENSORINEURAL HEARING LOSS, BILATERAL: Primary | ICD-10-CM

## 2023-06-16 DIAGNOSIS — H90.6 MIXED CONDUCTIVE AND SENSORINEURAL HEARING LOSS OF BOTH EARS: ICD-10-CM

## 2023-06-16 PROCEDURE — 92557 COMPREHENSIVE HEARING TEST: CPT | Performed by: AUDIOLOGIST

## 2023-06-16 PROCEDURE — 92550 TYMPANOMETRY & REFLEX THRESH: CPT | Performed by: AUDIOLOGIST

## 2023-06-16 PROCEDURE — 99207 PR NO CHARGE LOS: CPT | Performed by: AUDIOLOGIST

## 2023-06-16 NOTE — PROGRESS NOTES
AUDIOLOGY REPORT    SUBJECTIVE:  Pam Hartman is a 88 year old female who was seen in the Audiology Clinic at the [facility] for audiologic evaluation, referred by Satish Pena MD. The patient reports gradually worsening hearing. She has not had a hearing test or worn hearing aids previously. The patient reports a family history of hearing loss with age. She reports that she does not have tinnitus, ear pain, ear pressure, history of ear problems or ear surgery, or a history of loud noise exposure. The patient notes difficulty with communication in a variety of listening situations. The patient was unaccompanied to today's appointment.     OBJECTIVE:  Otoscopic exam indicates ears are clear of cerumen bilaterally     Pure Tone Thresholds assessed using conventional audiometry with good  reliability from 250-8000 Hz bilaterally using insert earphones and circumaural headphones     RIGHT:  normal hearing sensitivity through 2000 Hz sloping to mild to moderately severe sensorineural hearing loss    LEFT:    normal hearing sensitivity through 1000 Hz sloping to mild to moderately severe sensorineural hearing loss    Tympanogram:    RIGHT: restricted eardrum mobility (type As)    LEFT:   normal eardrum mobility    Reflexes (reported by stimulus ear):  RIGHT: Ipsilateral is present at normal levels  RIGHT: Contralateral is absent at frequencies tested  LEFT:   Ipsilateral is absent at frequencies tested  LEFT:   Contralateral is present at normal levels    Speech Reception Threshold:    RIGHT: 20 dB HL    LEFT:   25 dB HL    Word Recognition Score:     RIGHT: 96% at 60 dB HL using NU-6 recorded word list.    LEFT:   100% at 65 dB HL using NU-6 recorded word list.      ASSESSMENT:     ICD-10-CM    1. Sensorineural hearing loss, bilateral  H90.3 Cmprhn Audiometry Thrshld Eval & Speech Recog (33220)     Tymps / Reflex   (97898)      2. Mixed conductive and sensorineural hearing loss of both ears   H90.6 Adult Audiology [facility] Referral          Today s results were discussed with the patient in detail.     PLAN:  Patient was counseled regarding hearing loss and impact on communication. Patient is a good candidate for amplification at this time. The patient expressed interest in over the counter hearing aids, so this was discussed. Handout on good communication strategies and hearing aid use was given to patient. It is recommended that the patient return for a hearing aid consultation if she is interested in pursuing amplification through this clinic. Please call this clinic with questions regarding these results or recommendations.      Geo Donato, CCC-A  [place] Licensed Audiologist #[id number]  6/16/2023

## 2023-06-20 ENCOUNTER — TRANSFERRED RECORDS (OUTPATIENT)
Dept: MULTI SPECIALTY CLINIC | Facility: CLINIC | Age: 88
End: 2023-06-20

## 2023-06-20 LAB — RETINOPATHY: NORMAL

## 2023-08-29 ENCOUNTER — TELEPHONE (OUTPATIENT)
Dept: FAMILY MEDICINE | Facility: CLINIC | Age: 88
End: 2023-08-29
Payer: MEDICARE

## 2023-08-29 DIAGNOSIS — T78.3XXA ANGIOEDEMA, INITIAL ENCOUNTER: ICD-10-CM

## 2023-08-29 DIAGNOSIS — R73.02 GLUCOSE INTOLERANCE (IMPAIRED GLUCOSE TOLERANCE): ICD-10-CM

## 2023-08-29 DIAGNOSIS — E11.9 TYPE 2 DIABETES MELLITUS WITHOUT COMPLICATION, WITHOUT LONG-TERM CURRENT USE OF INSULIN (H): ICD-10-CM

## 2023-08-29 RX ORDER — LANCETS
EACH MISCELLANEOUS
Qty: 200 EACH | Refills: 3 | Status: SHIPPED | OUTPATIENT
Start: 2023-08-29

## 2023-08-29 NOTE — TELEPHONE ENCOUNTER
Patient dropped off a note for Becky in regards to wanting an Rx for her Diabetes. She stated that we can call her [facility] with an Rx.    Placed note in his mailbox.    - Elisa Machado

## 2023-08-30 NOTE — TELEPHONE ENCOUNTER
Start jardiance 10 mg daily   Report any changes in urinary symptoms ( signs of UTI or yeast infection)

## 2023-08-30 NOTE — TELEPHONE ENCOUNTER
Called patient and relayed provider's note below, patient states understanding and will start new medication. Has discontinued Metformin. No further questions.    SHAWN Farmer RN  [facility]

## 2023-09-18 ENCOUNTER — IMMUNIZATION (OUTPATIENT)
Dept: FAMILY MEDICINE | Facility: CLINIC | Age: 88
End: 2023-09-18
Payer: MEDICARE

## 2023-09-18 DIAGNOSIS — Z23 NEED FOR PROPHYLACTIC VACCINATION AND INOCULATION AGAINST INFLUENZA: Primary | ICD-10-CM

## 2023-09-18 PROCEDURE — 99207 PR NO CHARGE NURSE ONLY: CPT

## 2023-09-18 PROCEDURE — G0008 ADMIN INFLUENZA VIRUS VAC: HCPCS

## 2023-09-18 PROCEDURE — 90662 IIV NO PRSV INCREASED AG IM: CPT

## 2023-10-02 ENCOUNTER — ALLIED HEALTH/NURSE VISIT (OUTPATIENT)
Dept: FAMILY MEDICINE | Facility: CLINIC | Age: 88
End: 2023-10-02
Payer: MEDICARE

## 2023-10-02 DIAGNOSIS — Z23 HIGH PRIORITY FOR 2019-NCOV VACCINE: Primary | ICD-10-CM

## 2023-10-02 PROCEDURE — 90480 ADMN SARSCOV2 VAC 1/ONLY CMP: CPT

## 2023-10-02 PROCEDURE — 91320 SARSCV2 VAC 30MCG TRS-SUC IM: CPT

## 2023-12-03 ENCOUNTER — HEALTH MAINTENANCE LETTER (OUTPATIENT)
Age: 88
End: 2023-12-03

## 2024-01-11 ENCOUNTER — OFFICE VISIT (OUTPATIENT)
Dept: DERMATOLOGY | Facility: CLINIC | Age: 89
End: 2024-01-11
Payer: MEDICARE

## 2024-01-11 DIAGNOSIS — D18.01 CHERRY ANGIOMA: ICD-10-CM

## 2024-01-11 DIAGNOSIS — L81.4 LENTIGINES: ICD-10-CM

## 2024-01-11 DIAGNOSIS — D22.9 MULTIPLE BENIGN NEVI: Primary | ICD-10-CM

## 2024-01-11 DIAGNOSIS — Z80.8 FAMILY HISTORY OF MALIGNANT MELANOMA: ICD-10-CM

## 2024-01-11 DIAGNOSIS — L82.1 SEBORRHEIC KERATOSES: ICD-10-CM

## 2024-01-11 DIAGNOSIS — Z12.83 ENCOUNTER FOR SCREENING FOR MALIGNANT NEOPLASM OF SKIN: ICD-10-CM

## 2024-01-11 PROCEDURE — 99203 OFFICE O/P NEW LOW 30 MIN: CPT | Performed by: NURSE PRACTITIONER

## 2024-01-11 NOTE — LETTER
1/11/2024         RE: Pam Hartman  [address]  [place] [address]        Dear Colleague,    Thank you for referring your patient, Pam Hartman, to the [facility]. Please see a copy of my visit note below.    [facility] Dermatology Note  Encounter Date: Jan 11, 2024  Office Visit     Reviewed patients past medical history and pertinent chart review prior to patients visit today.     Dermatology Problem List:  Patient denies personal history of skin cancer or dysplastic nevi.    Son had melanoma    ____________________________________________    Assessment & Plan:     # Benign skin findings including: seborrheic keratoses, cherry angioma, lentigines and benign nevi.   - No further intervention required. Patient to report changes.   - Patient reassured of the benign nature of these lesions.    #Signs and Symptoms of non-melanoma skin cancer and ABCDEs of melanoma reviewed with patient. Patient encouraged to perform monthly self skin exams and educated on how to perform them. UV precautions reviewed with patient. Patient was asked about new or changing moles/lesions on body.     #Reviewed Sunscreen: Apply 20 minutes prior to going outdoors and reapply every two hours, when wet or sweating. We recommend using an SPF 30 or higher, and to use one that is water resistant.       Follow-up:  1-2 years for follow up full body skin exam, prn for new or changing lesions or new concerns    Natasha Blair CNP  Dermatology     ____________________________________________    CC: Skin Check (Full- concerns on back and face )    HPI:  Ms. Pam Hartman is a(n) 88 year old female who presents today as a new patient for a full body skin cancer screening. Patient has concerns today about some spots on her face and her back that are newer.     Patient is otherwise feeling well, without additional skin concerns.     Physical Exam:  Vitals: There were no vitals taken for  this visit.  SKIN: Total skin excluding the genitalia areas was performed. The exam included the head/face, neck, both arms, chest, back, abdomen, both legs, digits, mons pubis, buttock and nails.   -several 1-2mm red dome shaped symmetric papules scattered on the trunk  -multiple tan/brown flat round macules and raised papules scattered throughout trunk, extremities and head. No worrisome features for malignancy noted on examination.  -scattered tan, homogenous macules scattered on sun exposed areas of trunk, extremities and face.   -scattered waxy, stuck on tan/brown papules and patches on the trunk face and extremities (those noted in HPI)    - No other lesions of concern on areas examined.     Medications:  Current Outpatient Medications   Medication     Acetaminophen (TYLENOL PO)     amLODIPine (NORVASC) 10 MG tablet     aspirin 81 MG tablet     blood glucose (NO BRAND SPECIFIED) test strip     blood glucose monitoring (NO BRAND SPECIFIED) meter device kit     calcipotriene (DOVONEX) 0.005 % SOLN solution     calcium carbonate-vitamin D 500-400 MG-UNIT TABS tablt     empagliflozin (JARDIANCE) 10 MG TABS tablet     furosemide (LASIX) 20 MG tablet     losartan (COZAAR) 100 MG tablet     Probiotic Product (PROBIOTIC PO)     simvastatin (ZOCOR) 20 MG tablet     thin (NO BRAND SPECIFIED) lancets     No current facility-administered medications for this visit.      Past Medical History:   Patient Active Problem List   Diagnosis     HYPERLIPIDEMIA LDL GOAL <100     Psoriasis     IBS (irritable bowel syndrome)     Health Care Home     ASCUS on Pap smear     Advanced directives, counseling/discussion     Type 2 diabetes, HbA1C goal < 8% (H)     Rotator cuff tear arthropathy     Chronic maxillary sinusitis     HTN, goal below 150/90     Type 2 diabetes mellitus without complication (H)     Past Medical History:   Diagnosis Date     Colon polyp      Degenerative arthritis      DM II (diabetes mellitus, type II)      HTN       Hyperlipidemia LDL goal < 100      IBS (irritable bowel syndrome)      Osteopenia      Psoriasis      Type 2 diabetes mellitus without complication (H) 10/21/2015     Ulnar neuropathy        CC Referred Self, MD  No address on file on close of this encounter.      Again, thank you for allowing me to participate in the care of your patient.        Sincerely,        CHINEDU Wallace CNP

## 2024-01-11 NOTE — PROGRESS NOTES
[facility] Dermatology Note  Encounter Date: Jan 11, 2024  Office Visit     Reviewed patients past medical history and pertinent chart review prior to patients visit today.     Dermatology Problem List:  Patient denies personal history of skin cancer or dysplastic nevi.    Son had melanoma    ____________________________________________    Assessment & Plan:     # Benign skin findings including: seborrheic keratoses, cherry angioma, lentigines and benign nevi.   - No further intervention required. Patient to report changes.   - Patient reassured of the benign nature of these lesions.    #Signs and Symptoms of non-melanoma skin cancer and ABCDEs of melanoma reviewed with patient. Patient encouraged to perform monthly self skin exams and educated on how to perform them. UV precautions reviewed with patient. Patient was asked about new or changing moles/lesions on body.     #Reviewed Sunscreen: Apply 20 minutes prior to going outdoors and reapply every two hours, when wet or sweating. We recommend using an SPF 30 or higher, and to use one that is water resistant.       Follow-up:  1-2 years for follow up full body skin exam, prn for new or changing lesions or new concerns    Natsaha Blair CNP  Dermatology     ____________________________________________    CC: Skin Check (Full- concerns on back and face )    HPI:  Ms. Pam Hartman is a(n) 88 year old female who presents today as a new patient for a full body skin cancer screening. Patient has concerns today about some spots on her face and her back that are newer.     Patient is otherwise feeling well, without additional skin concerns.     Physical Exam:  Vitals: There were no vitals taken for this visit.  SKIN: Total skin excluding the genitalia areas was performed. The exam included the head/face, neck, both arms, chest, back, abdomen, both legs, digits, mons pubis, buttock and nails.   -several 1-2mm red dome shaped symmetric papules scattered on  the trunk  -multiple tan/brown flat round macules and raised papules scattered throughout trunk, extremities and head. No worrisome features for malignancy noted on examination.  -scattered tan, homogenous macules scattered on sun exposed areas of trunk, extremities and face.   -scattered waxy, stuck on tan/brown papules and patches on the trunk face and extremities (those noted in HPI)    - No other lesions of concern on areas examined.     Medications:  Current Outpatient Medications   Medication    Acetaminophen (TYLENOL PO)    amLODIPine (NORVASC) 10 MG tablet    aspirin 81 MG tablet    blood glucose (NO BRAND SPECIFIED) test strip    blood glucose monitoring (NO BRAND SPECIFIED) meter device kit    calcipotriene (DOVONEX) 0.005 % SOLN solution    calcium carbonate-vitamin D 500-400 MG-UNIT TABS tablt    empagliflozin (JARDIANCE) 10 MG TABS tablet    furosemide (LASIX) 20 MG tablet    losartan (COZAAR) 100 MG tablet    Probiotic Product (PROBIOTIC PO)    simvastatin (ZOCOR) 20 MG tablet    thin (NO BRAND SPECIFIED) lancets     No current facility-administered medications for this visit.      Past Medical History:   Patient Active Problem List   Diagnosis    HYPERLIPIDEMIA LDL GOAL <100    Psoriasis    IBS (irritable bowel syndrome)    Health Care Home    ASCUS on Pap smear    Advanced directives, counseling/discussion    Type 2 diabetes, HbA1C goal < 8% (H)    Rotator cuff tear arthropathy    Chronic maxillary sinusitis    HTN, goal below 150/90    Type 2 diabetes mellitus without complication (H)     Past Medical History:   Diagnosis Date    Colon polyp     Degenerative arthritis     DM II (diabetes mellitus, type II)     HTN     Hyperlipidemia LDL goal < 100     IBS (irritable bowel syndrome)     Osteopenia     Psoriasis     Type 2 diabetes mellitus without complication (H) 10/21/2015    Ulnar neuropathy        CC Referred Self, MD  No address on file on close of this encounter.

## 2024-02-13 DIAGNOSIS — E11.9 TYPE 2 DIABETES MELLITUS WITHOUT COMPLICATION, WITHOUT LONG-TERM CURRENT USE OF INSULIN (H): ICD-10-CM

## 2024-02-13 RX ORDER — EMPAGLIFLOZIN 10 MG/1
10 TABLET, FILM COATED ORAL DAILY
Qty: 90 TABLET | Refills: 1 | Status: SHIPPED | OUTPATIENT
Start: 2024-02-13 | End: 2024-03-28

## 2024-03-25 ENCOUNTER — MYC MEDICAL ADVICE (OUTPATIENT)
Dept: FAMILY MEDICINE | Facility: CLINIC | Age: 89
End: 2024-03-25
Payer: MEDICARE

## 2024-03-27 ENCOUNTER — OFFICE VISIT (OUTPATIENT)
Dept: FAMILY MEDICINE | Facility: CLINIC | Age: 89
End: 2024-03-27
Payer: MEDICARE

## 2024-03-27 VITALS
HEIGHT: 59 IN | HEART RATE: 67 BPM | SYSTOLIC BLOOD PRESSURE: 150 MMHG | BODY MASS INDEX: 28.43 KG/M2 | DIASTOLIC BLOOD PRESSURE: 77 MMHG | WEIGHT: 141 LBS | TEMPERATURE: 97.8 F | OXYGEN SATURATION: 98 % | RESPIRATION RATE: 18 BRPM

## 2024-03-27 DIAGNOSIS — Z23 NEED FOR TDAP VACCINATION: ICD-10-CM

## 2024-03-27 DIAGNOSIS — G62.9 PERIPHERAL POLYNEUROPATHY: ICD-10-CM

## 2024-03-27 DIAGNOSIS — B37.31 CANDIDIASIS OF VAGINA: ICD-10-CM

## 2024-03-27 DIAGNOSIS — E11.9 TYPE 2 DIABETES MELLITUS WITHOUT COMPLICATION, WITHOUT LONG-TERM CURRENT USE OF INSULIN (H): Primary | ICD-10-CM

## 2024-03-27 DIAGNOSIS — Z29.11 NEED FOR VACCINATION AGAINST RESPIRATORY SYNCYTIAL VIRUS: ICD-10-CM

## 2024-03-27 LAB
ALBUMIN UR-MCNC: NEGATIVE MG/DL
APPEARANCE UR: ABNORMAL
BILIRUB UR QL STRIP: NEGATIVE
CLUE CELLS: ABNORMAL
COLOR UR AUTO: YELLOW
GLUCOSE UR STRIP-MCNC: >=1000 MG/DL
HBA1C MFR BLD: 7.2 % (ref 0–5.6)
HGB UR QL STRIP: ABNORMAL
KETONES UR STRIP-MCNC: NEGATIVE MG/DL
LEUKOCYTE ESTERASE UR QL STRIP: NEGATIVE
NITRATE UR QL: NEGATIVE
PH UR STRIP: 5.5 [PH] (ref 5–7)
RBC #/AREA URNS AUTO: ABNORMAL /HPF
SP GR UR STRIP: 1.01 (ref 1–1.03)
SQUAMOUS #/AREA URNS AUTO: ABNORMAL /LPF
TRICHOMONAS, WET PREP: ABNORMAL
UROBILINOGEN UR STRIP-ACNC: 0.2 E.U./DL
WBC #/AREA URNS AUTO: ABNORMAL /HPF
WBC'S/HIGH POWER FIELD, WET PREP: ABNORMAL
YEAST, WET PREP: ABNORMAL

## 2024-03-27 PROCEDURE — 87210 SMEAR WET MOUNT SALINE/INK: CPT | Performed by: INTERNAL MEDICINE

## 2024-03-27 PROCEDURE — 36415 COLL VENOUS BLD VENIPUNCTURE: CPT | Performed by: INTERNAL MEDICINE

## 2024-03-27 PROCEDURE — 83036 HEMOGLOBIN GLYCOSYLATED A1C: CPT | Performed by: INTERNAL MEDICINE

## 2024-03-27 PROCEDURE — 84443 ASSAY THYROID STIM HORMONE: CPT | Performed by: INTERNAL MEDICINE

## 2024-03-27 PROCEDURE — 99213 OFFICE O/P EST LOW 20 MIN: CPT | Performed by: INTERNAL MEDICINE

## 2024-03-27 PROCEDURE — 81001 URINALYSIS AUTO W/SCOPE: CPT | Performed by: INTERNAL MEDICINE

## 2024-03-27 PROCEDURE — 82607 VITAMIN B-12: CPT | Performed by: INTERNAL MEDICINE

## 2024-03-27 RX ORDER — RESPIRATORY SYNCYTIAL VIRUS VACCINE 120MCG/0.5
0.5 KIT INTRAMUSCULAR ONCE
Qty: 1 EACH | Refills: 0 | Status: CANCELLED | OUTPATIENT
Start: 2024-03-27 | End: 2024-03-27

## 2024-03-27 NOTE — PROGRESS NOTES
"  Assessment & Plan   Problem List Items Addressed This Visit       Type 2 diabetes mellitus without complication (H) - Primary    Relevant Medications    blood glucose monitoring (NO BRAND SPECIFIED) meter device kit    blood glucose (NO BRAND SPECIFIED) test strip    blood glucose (NO BRAND SPECIFIED) lancets standard    Other Relevant Orders    HEMOGLOBIN A1C (Completed)    TSH with free T4 reflex     Other Visit Diagnoses       Need for Tdap vaccination        Need for vaccination against respiratory syncytial virus        Candidiasis of vagina        Relevant Orders    Wet prep - lab collect (Completed)    UA Macroscopic with reflex to Microscopic and Culture - Lab Collect (Completed)    UA Microscopic with Reflex to Culture (Completed)    Peripheral polyneuropathy        Relevant Orders    Vitamin B12           BP     150/77  3/28/2024    Lab Results   Component Value Date     06/01/2023     11/01/2021     04/29/2019     Lab Results   Component Value Date    A1C 7.2 03/27/2024    A1C 7.1 04/29/2019     Lab Results   Component Value Date    LDL 62 06/01/2023    LDL 68 04/29/2019     Lab Results   Component Value Date    MICROL 53.3 06/01/2023    MICROL 16 11/01/2021    MICROL 10 04/29/2019     No results found for: \"MICROALBUMIN\"           BMI  Estimated body mass index is 28.62 kg/m  as calculated from the following:    Height as of this encounter: 1.495 m (4' 10.85\").    Weight as of this encounter: 64 kg (141 lb).   Weight management plan: Discussed healthy diet and exercise guidelines    Work on weight loss  Regular exercise      René Orozco is a 89 year old, presenting for the following health issues:  RECHECK        3/27/2024     1:41 PM   Additional Questions   Roomed by Natasha     Via the Health Maintenance questionnaire, the patient has reported the following services have been completed -Eye Exam, this information has been sent to the abstraction team.  History of Present " "Illness       Reason for visit:  Check for yeast infection  Symptom onset:  More than a month  Symptoms include:  Itching ,swelling, discharge  Symptom intensity:  Moderate  Symptom progression:  Staying the same  Had these symptoms before:  No  What makes it worse:  No  What makes it better:  No    She eats 2-3 servings of fruits and vegetables daily.She consumes 0 sweetened beverage(s) daily.She exercises with enough effort to increase her heart rate 9 or less minutes per day.  She exercises with enough effort to increase her heart rate 3 or less days per week.   She is taking medications regularly.     Several months   Concern about yeast infection   Worsening - jardiance side effect.   Mucous in the area                 Review of Systems  Constitutional, HEENT, cardiovascular, pulmonary, gi and gu systems are negative, except as otherwise noted.      Objective    BP (!) 150/77 (BP Location: Right arm, Patient Position: Chair, Cuff Size: Adult Regular)   Pulse 67   Temp 97.8  F (36.6  C)   Resp 18   Ht 1.495 m (4' 10.85\")   Wt 64 kg (141 lb)   SpO2 98%   BMI 28.62 kg/m    Body mass index is 28.62 kg/m .  Physical Exam   GENERAL: alert and no distress  EYES: Eyes grossly normal to inspection, PERRL and conjunctivae and sclerae normal  HENT: ear canals and TM's normal, nose and mouth without ulcers or lesions  NECK: no adenopathy, no asymmetry, masses, or scars  RESP: lungs clear to auscultation - no rales, rhonchi or wheezes  CV: regular rate and rhythm, normal S1 S2, no S3 or S4, no murmur, click or rub, no peripheral edema  ABDOMEN: soft, nontender, no hepatosplenomegaly, no masses and bowel sounds normal  MS: no gross musculoskeletal defects noted, no edema  SKIN: no suspicious lesions or rashes  NEURO: Normal strength and tone, mentation intact and speech normal  BACK: no CVA tenderness, no paralumbar tenderness  PSYCH: mentation appears normal, affect normal/bright  LYMPH: no cervical, supraclavicular, " axillary, or inguinal adenopathy    Results for orders placed or performed in visit on 03/27/24   HEMOGLOBIN A1C     Status: Abnormal   Result Value Ref Range    Hemoglobin A1C 7.2 (H) 0.0 - 5.6 %   UA Macroscopic with reflex to Microscopic and Culture - Lab Collect     Status: Abnormal    Specimen: Urine, Midstream   Result Value Ref Range    Color Urine Yellow Colorless, Straw, Light Yellow, Yellow    Appearance Urine Slightly Cloudy (A) Clear    Glucose Urine >=1000 (A) Negative mg/dL    Bilirubin Urine Negative Negative    Ketones Urine Negative Negative mg/dL    Specific Gravity Urine 1.015 1.003 - 1.035    Blood Urine Trace (A) Negative    pH Urine 5.5 5.0 - 7.0    Protein Albumin Urine Negative Negative mg/dL    Urobilinogen Urine 0.2 0.2, 1.0 E.U./dL    Nitrite Urine Negative Negative    Leukocyte Esterase Urine Negative Negative   UA Microscopic with Reflex to Culture     Status: Abnormal   Result Value Ref Range    RBC Urine 2-5 (A) 0-2 /HPF /HPF    WBC Urine 0-5 0-5 /HPF /HPF    Squamous Epithelials Urine Few (A) None Seen /LPF    Narrative    Urine Culture not indicated   Wet prep - lab collect     Status: Abnormal    Specimen: Vagina; Swab   Result Value Ref Range    Trichomonas Absent Absent    Yeast Absent Absent    Clue Cells Absent Absent    WBCs/high power field 3+ (A) None           Signed Electronically by: Satish Pena MD

## 2024-03-28 LAB
TSH SERPL DL<=0.005 MIU/L-ACNC: 1.19 UIU/ML (ref 0.3–4.2)
VIT B12 SERPL-MCNC: 566 PG/ML (ref 232–1245)

## 2024-03-28 RX ORDER — FLUCONAZOLE 150 MG/1
150 TABLET ORAL ONCE
Qty: 1 TABLET | Refills: 0 | Status: SHIPPED | OUTPATIENT
Start: 2024-03-28 | End: 2024-03-28

## 2024-04-02 ENCOUNTER — MEDICAL CORRESPONDENCE (OUTPATIENT)
Dept: HEALTH INFORMATION MANAGEMENT | Facility: CLINIC | Age: 89
End: 2024-04-02
Payer: MEDICARE

## 2024-04-17 DIAGNOSIS — E78.5 HYPERLIPIDEMIA LDL GOAL <100: ICD-10-CM

## 2024-04-17 DIAGNOSIS — R60.1 GENERALIZED EDEMA: ICD-10-CM

## 2024-04-17 DIAGNOSIS — I10 ESSENTIAL HYPERTENSION WITH GOAL BLOOD PRESSURE LESS THAN 140/90: ICD-10-CM

## 2024-04-17 DIAGNOSIS — E11.9 TYPE 2 DIABETES MELLITUS WITHOUT COMPLICATION, WITHOUT LONG-TERM CURRENT USE OF INSULIN (H): ICD-10-CM

## 2024-04-17 RX ORDER — FUROSEMIDE 20 MG
TABLET ORAL
Qty: 180 TABLET | Refills: 3 | Status: SHIPPED | OUTPATIENT
Start: 2024-04-17

## 2024-04-17 RX ORDER — LOSARTAN POTASSIUM 100 MG/1
TABLET ORAL
Qty: 90 TABLET | Refills: 3 | Status: SHIPPED | OUTPATIENT
Start: 2024-04-17

## 2024-04-17 RX ORDER — SIMVASTATIN 20 MG
TABLET ORAL
Qty: 45 TABLET | Refills: 2 | Status: SHIPPED | OUTPATIENT
Start: 2024-04-17

## 2024-04-17 RX ORDER — AMLODIPINE BESYLATE 10 MG/1
TABLET ORAL
Qty: 90 TABLET | Refills: 3 | Status: SHIPPED | OUTPATIENT
Start: 2024-04-17

## 2024-04-23 ENCOUNTER — ALLIED HEALTH/NURSE VISIT (OUTPATIENT)
Dept: FAMILY MEDICINE | Facility: CLINIC | Age: 89
End: 2024-04-23
Payer: MEDICARE

## 2024-04-23 DIAGNOSIS — Z29.11 NEED FOR VACCINATION AGAINST RESPIRATORY SYNCYTIAL VIRUS: ICD-10-CM

## 2024-04-23 DIAGNOSIS — Z23 ENCOUNTER FOR IMMUNIZATION: Primary | ICD-10-CM

## 2024-04-23 DIAGNOSIS — Z23 NEED FOR TDAP VACCINATION: ICD-10-CM

## 2024-04-23 PROCEDURE — 99207 PR NO CHARGE NURSE ONLY: CPT

## 2024-04-23 PROCEDURE — 90480 ADMN SARSCOV2 VAC 1/ONLY CMP: CPT

## 2024-04-23 PROCEDURE — 91320 SARSCV2 VAC 30MCG TRS-SUC IM: CPT

## 2024-04-23 NOTE — PROGRESS NOTES
Prior to immunization administration, verified patients identity using patient s name and date of birth. Please see Immunization Activity for additional information.     Screening Questionnaire for Adult Immunization    Are you sick today?   No   Do you have allergies to medications, food, a vaccine component or latex?   No   Have you ever had a serious reaction after receiving a vaccination?   No   Do you have a long-term health problem with heart, lung, kidney, or metabolic disease (e.g., diabetes), asthma, a blood disorder, no spleen, complement component deficiency, a cochlear implant, or a spinal fluid leak?  Are you on long-term aspirin therapy?   No   Do you have cancer, leukemia, HIV/AIDS, or any other immune system problem?   No   Do you have a parent, brother, or sister with an immune system problem?   No   In the past 3 months, have you taken medications that affect  your immune system, such as prednisone, other steroids, or anticancer drugs; drugs for the treatment of rheumatoid arthritis, Crohn s disease, or psoriasis; or have you had radiation treatments?   No   Have you had a seizure, or a brain or other nervous system problem?   No   During the past year, have you received a transfusion of blood or blood    products, or been given immune (gamma) globulin or antiviral drug?   No   For women: Are you pregnant or is there a chance you could become       pregnant during the next month?   No   Have you received any vaccinations in the past 4 weeks?   No     Immunization questionnaire answers were all negative.    I have reviewed the following standing orders:   This patient is due and qualifies for the Covid-19 vaccine.     Click here for COVID-19 Standing Order    I have reviewed the vaccines inclusion and exclusion criteria; No concerns regarding eligibility.     Patient instructed to remain in clinic for 15 minutes afterwards, and to report any adverse reactions.     Screening performed by Jazzy  CASEY Weems on 4/23/2024 at 11:38 AM.

## 2024-05-28 ENCOUNTER — MYC MEDICAL ADVICE (OUTPATIENT)
Dept: FAMILY MEDICINE | Facility: CLINIC | Age: 89
End: 2024-05-28
Payer: MEDICARE

## 2024-05-29 SDOH — HEALTH STABILITY: PHYSICAL HEALTH: ON AVERAGE, HOW MANY DAYS PER WEEK DO YOU ENGAGE IN MODERATE TO STRENUOUS EXERCISE (LIKE A BRISK WALK)?: 1 DAY

## 2024-05-29 SDOH — HEALTH STABILITY: PHYSICAL HEALTH: ON AVERAGE, HOW MANY MINUTES DO YOU ENGAGE IN EXERCISE AT THIS LEVEL?: 20 MIN

## 2024-05-29 ASSESSMENT — SOCIAL DETERMINANTS OF HEALTH (SDOH): HOW OFTEN DO YOU GET TOGETHER WITH FRIENDS OR RELATIVES?: THREE TIMES A WEEK

## 2024-06-05 ENCOUNTER — OFFICE VISIT (OUTPATIENT)
Dept: FAMILY MEDICINE | Facility: CLINIC | Age: 89
End: 2024-06-05
Payer: MEDICARE

## 2024-06-05 VITALS
HEART RATE: 79 BPM | DIASTOLIC BLOOD PRESSURE: 70 MMHG | TEMPERATURE: 98.3 F | SYSTOLIC BLOOD PRESSURE: 132 MMHG | WEIGHT: 141 LBS | RESPIRATION RATE: 18 BRPM | OXYGEN SATURATION: 98 % | BODY MASS INDEX: 28.43 KG/M2 | HEIGHT: 59 IN

## 2024-06-05 DIAGNOSIS — Z23 NEED FOR TDAP VACCINATION: ICD-10-CM

## 2024-06-05 DIAGNOSIS — E11.9 TYPE 2 DIABETES MELLITUS WITHOUT COMPLICATION, WITH LONG-TERM CURRENT USE OF INSULIN (H): ICD-10-CM

## 2024-06-05 DIAGNOSIS — R30.0 DYSURIA: ICD-10-CM

## 2024-06-05 DIAGNOSIS — N76.0 VAGINITIS AND VULVOVAGINITIS: ICD-10-CM

## 2024-06-05 DIAGNOSIS — I10 HTN, GOAL BELOW 150/90: Primary | ICD-10-CM

## 2024-06-05 DIAGNOSIS — Z29.11 NEED FOR VACCINATION AGAINST RESPIRATORY SYNCYTIAL VIRUS: ICD-10-CM

## 2024-06-05 DIAGNOSIS — Z79.4 TYPE 2 DIABETES MELLITUS WITHOUT COMPLICATION, WITH LONG-TERM CURRENT USE OF INSULIN (H): ICD-10-CM

## 2024-06-05 LAB
ALBUMIN UR-MCNC: NEGATIVE MG/DL
APPEARANCE UR: ABNORMAL
BACTERIA #/AREA URNS HPF: ABNORMAL /HPF
BILIRUB UR QL STRIP: NEGATIVE
CLUE CELLS: ABNORMAL
COLOR UR AUTO: YELLOW
GLUCOSE UR STRIP-MCNC: >=1000 MG/DL
HGB UR QL STRIP: ABNORMAL
KETONES UR STRIP-MCNC: NEGATIVE MG/DL
LEUKOCYTE ESTERASE UR QL STRIP: NEGATIVE
NITRATE UR QL: NEGATIVE
PH UR STRIP: 5.5 [PH] (ref 5–7)
RBC #/AREA URNS AUTO: ABNORMAL /HPF
SP GR UR STRIP: 1.01 (ref 1–1.03)
SQUAMOUS #/AREA URNS AUTO: ABNORMAL /LPF
TRICHOMONAS, WET PREP: ABNORMAL
UROBILINOGEN UR STRIP-ACNC: 0.2 E.U./DL
WBC #/AREA URNS AUTO: ABNORMAL /HPF
WBC'S/HIGH POWER FIELD, WET PREP: ABNORMAL
YEAST, WET PREP: ABNORMAL

## 2024-06-05 PROCEDURE — G0439 PPPS, SUBSEQ VISIT: HCPCS | Performed by: INTERNAL MEDICINE

## 2024-06-05 PROCEDURE — 81001 URINALYSIS AUTO W/SCOPE: CPT | Performed by: INTERNAL MEDICINE

## 2024-06-05 PROCEDURE — 87210 SMEAR WET MOUNT SALINE/INK: CPT | Performed by: INTERNAL MEDICINE

## 2024-06-05 PROCEDURE — 82570 ASSAY OF URINE CREATININE: CPT | Performed by: INTERNAL MEDICINE

## 2024-06-05 PROCEDURE — 82043 UR ALBUMIN QUANTITATIVE: CPT | Performed by: INTERNAL MEDICINE

## 2024-06-05 RX ORDER — INSULIN GLARGINE 100 [IU]/ML
10 INJECTION, SOLUTION SUBCUTANEOUS AT BEDTIME
Qty: 15 ML | Refills: 3 | Status: SHIPPED | OUTPATIENT
Start: 2024-06-05

## 2024-06-05 RX ORDER — FLURBIPROFEN SODIUM 0.3 MG/ML
SOLUTION/ DROPS OPHTHALMIC
Qty: 100 EACH | Refills: 3 | Status: SHIPPED | OUTPATIENT
Start: 2024-06-05

## 2024-06-05 RX ORDER — GLUCOSAMINE HCL/CHONDROITIN SU 500-400 MG
CAPSULE ORAL
Qty: 100 EACH | Refills: 3 | Status: SHIPPED | OUTPATIENT
Start: 2024-06-05

## 2024-06-05 RX ORDER — RESPIRATORY SYNCYTIAL VIRUS VACCINE 120MCG/0.5
0.5 KIT INTRAMUSCULAR ONCE
Qty: 1 EACH | Refills: 0 | Status: CANCELLED | OUTPATIENT
Start: 2024-06-05 | End: 2024-06-05

## 2024-06-05 NOTE — PROGRESS NOTES
"Preventive Care Visit  [facility] ELIAZAR Pena MD, Internal Medicine - Pediatrics  Jun 5, 2024      Assessment & Plan   Problem List Items Addressed This Visit       HTN, goal below 150/90 - Primary    Relevant Orders    BASIC METABOLIC PANEL    Type 2 diabetes mellitus without complication (H)    Relevant Medications    insulin glargine (LANTUS SOLOSTAR) 100 UNIT/ML pen    insulin pen needle (ULTICARE MINI) 31G X 6 MM miscellaneous    alcohol swab prep pads    Other Relevant Orders    Lipid panel reflex to direct LDL Non-fasting    Albumin Random Urine Quantitative with Creat Ratio (Completed)    Adult Eye  Referral    Hemoglobin A1c     Other Visit Diagnoses       Need for Tdap vaccination        Need for vaccination against respiratory syncytial virus        Dysuria        Relevant Orders    UA Macroscopic with reflex to Microscopic and Culture - Lab Collect (Completed)    UA Microscopic with Reflex to Culture (Completed)    Vaginitis and vulvovaginitis        Relevant Orders    Wet prep - lab collect (Completed)             BP     132/70  6/6/2024    Lab Results   Component Value Date     06/01/2023     11/01/2021     04/29/2019     Lab Results   Component Value Date    A1C 7.2 03/27/2024    A1C 7.1 04/29/2019     Lab Results   Component Value Date    LDL 62 06/01/2023    LDL 68 04/29/2019     Lab Results   Component Value Date    MICROL <12.0 06/05/2024    MICROL 16 11/01/2021    MICROL 10 04/29/2019     No results found for: \"MICROALBUMIN\"             Counseling  Appropriate preventive services were discussed with this patient, including applicable screening as appropriate for fall prevention, nutrition, physical activity, Tobacco-use cessation, weight loss and cognition.  Checklist reviewing preventive services available has been given to the patient.    Work on weight loss  Regular exercise      René Orozco is a 89 year old, presenting for the " following:  Physical        6/5/2024     9:45 AM   Additional Questions   Roomed by Natasha         Health Care Directive  Patient does not have a Health Care Directive or Living Will: Discussed advance care planning with patient; information given to patient to review.    HPI   From the jardiance - from the area .    Since - from the begininng   Checking before breakfast   [phone]   Not drinking milk and dairy   Lactaid milk and cooking .  Lactaid capsules - staves off the issues   Tries to be careful for the dairy               5/29/2024   General Health   How would you rate your overall physical health? Good   Feel stress (tense, anxious, or unable to sleep) Only a little   (!) STRESS CONCERN      5/29/2024   Nutrition   Diet: Low salt    Low fat/cholesterol    Diabetic         5/29/2024   Exercise   Days per week of moderate/strenous exercise 1 day   Average minutes spent exercising at this level 20 min   (!) EXERCISE CONCERN      5/29/2024   Social Factors   Frequency of gathering with friends or relatives Three times a week   Worry food won't last until get money to buy more No   Food not last or not have enough money for food? No   Do you have housing?  Yes   Are you worried about losing your housing? No   Lack of transportation? No   Unable to get utilities (heat,electricity)? No         5/29/2024   Fall Risk   Fallen 2 or more times in the past year? No    No   Trouble with walking or balance? No    No          5/29/2024   Activities of Daily Living- Home Safety   Needs help with the following daily activites None of the above   Safety concerns in the home None of the above         5/29/2024   Dental   Dentist two times every year? Yes         5/29/2024   Hearing Screening   Hearing concerns? (!) I FEEL THAT PEOPLE ARE MUMBLING OR NOT SPEAKING CLEARLY.    (!) I NEED TO ASK PEOPLE TO SPEAK UP OR REPEAT THEMSELVES.         5/29/2024   Driving Risk Screening   Patient/family members have concerns about driving No          2024   General Alertness/Fatigue Screening   Have you been more tired than usual lately? No         2024   Urinary Incontinence Screening   Bothered by leaking urine in past 6 months No         2024   TB Screening   Were you born outside of the [place]? No         Today's PHQ-2 Score:       2024     9:29 AM   PHQ-2 (  [employer])   Q1: Little interest or pleasure in doing things 0   Q2: Feeling down, depressed or hopeless 0   PHQ-2 Score 0   Q1: Little interest or pleasure in doing things Not at all   Q2: Feeling down, depressed or hopeless Not at all   PHQ-2 Score 0           2024   Substance Use   Alcohol more than 3/day or more than 7/wk No   Do you have a current opioid prescription? No   How severe/bad is pain from 1 to 10? 2/10   Do you use any other substances recreationally? (!) PRESCRIPTION DRUGS     Social History     Tobacco Use    Smoking status: Former     Current packs/day: 0.00     Average packs/day: 0.5 packs/day for 16.0 years (8.0 ttl pk-yrs)     Types: Cigarettes     Start date: 1964     Quit date: 1980     Years since quittin.5     Passive exposure: Past    Smokeless tobacco: Never   Vaping Use    Vaping status: Never Used   Substance Use Topics    Alcohol use: Not Currently     Comment: 2 DRINKS PER MONTH    Drug use: No                    Reviewed and updated as needed this visit by Provider                    Lab work is in process  Labs reviewed in EPIC  BP Readings from Last 3 Encounters:   24 132/70   24 (!) 150/77   23 122/70    Wt Readings from Last 3 Encounters:   24 64 kg (141 lb)   24 64 kg (141 lb)   23 65 kg (143 lb 3.2 oz)                  Patient Active Problem List   Diagnosis    HYPERLIPIDEMIA LDL GOAL <100    Psoriasis    IBS (irritable bowel syndrome)    Health Care Home    ASCUS on Pap smear    Advanced directives, counseling/discussion    Type 2 diabetes, HbA1C goal < 8% (H)    Rotator cuff tear  arthropathy    Chronic maxillary sinusitis    HTN, goal below 150/90    Type 2 diabetes mellitus without complication (H)     Past Surgical History:   Procedure Laterality Date    EYE SURGERY  2015    Left and right cataract repair    REVERSE ARTHROPLASTY SHOULDER Right 2017    Nahun Skinner MD at [facility]    SEPTOPLASTY  2014    Procedure: SEPTOPLASTY;  Septoplasty, Left ethmoidectomy, Left endoscopic maxillary antrostomy, right kike bullosa;  Surgeon: Sarah Garcia MD;  Location:  OR    TONSILLECTOMY & ADENOIDECTOMY         Social History     Tobacco Use    Smoking status: Former     Current packs/day: 0.00     Average packs/day: 0.5 packs/day for 16.0 years (8.0 ttl pk-yrs)     Types: Cigarettes     Start date: 1964     Quit date: 1980     Years since quittin.5     Passive exposure: Past    Smokeless tobacco: Never   Substance Use Topics    Alcohol use: Not Currently     Comment: 2 DRINKS PER MONTH     Family History   Problem Relation Age of Onset    Macular Degeneration Mother     Cerebrovascular Disease Father     Melanoma Son     Glaucoma No family hx of          Current Outpatient Medications   Medication Sig Dispense Refill    Acetaminophen (TYLENOL PO) Take 2,000 mg by mouth daily       alcohol swab prep pads Use to swab area of injection/chitra as directed. 100 each 3    amLODIPine (NORVASC) 10 MG tablet TAKE 1 TABLET(10 MG) BY MOUTH DAILY 90 tablet 3    aspirin 81 MG tablet Take 1 tablet by mouth daily.      blood glucose (NO BRAND SPECIFIED) lancets standard Use to test blood sugar 1 times daily or as directed. 100 each 2    blood glucose (NO BRAND SPECIFIED) test strip Use to test blood sugar 1 times daily or as directed. 100 strip 2    blood glucose (NO BRAND SPECIFIED) test strip Use to test blood sugars 2 times daily or as directed 200 strip 3    blood glucose monitoring (NO BRAND SPECIFIED) meter device kit Use to test blood sugar 1 times daily or as  directed. 1 kit 3    blood glucose monitoring (NO BRAND SPECIFIED) meter device kit Use to test blood sugar 2 times daily or as directed. 1 kit 0    calcipotriene (DOVONEX) 0.005 % SOLN solution Apply to scalp BID as needed 1 Bottle 4    calcium carbonate-vitamin D 500-400 MG-UNIT TABS tablt ONE BY MOUTH TWICE A  tablet 3    furosemide (LASIX) 20 MG tablet TAKE 2 TABLETS BY MOUTH EVERY  tablet 3    insulin glargine (LANTUS SOLOSTAR) 100 UNIT/ML pen Inject 10 Units Subcutaneous at bedtime 15 mL 3    insulin pen needle (ULTICARE MINI) 31G X 6 MM miscellaneous Use 1 pen needles daily or as directed. 100 each 3    losartan (COZAAR) 100 MG tablet TAKE 1 TABLET(100 MG) BY MOUTH DAILY 90 tablet 3    Probiotic Product (PROBIOTIC PO)       simvastatin (ZOCOR) 20 MG tablet TAKE 1/2 TABLET BY MOUTH EVERY NIGHT AT BEDTIME 45 tablet 2    thin (NO BRAND SPECIFIED) lancets Use with lanceting device. To accompany: Blood Glucose Monitor Brands: per insurance. 200 each 3     Current providers sharing in care for this patient include:  Patient Care Team:  Satish Pena MD as PCP - General  Satish Pena MD as Assigned PCP  Greta Funez AuD as Audiologist (Audiology)  Jazzy Blair APRN CNP as Nurse Practitioner (Dermatology)  Jazzy Blair APRN CNP as Assigned Surgical Provider    The following health maintenance items are reviewed in Epic and correct as of today:  Health Maintenance   Topic Date Due    RSV VACCINE (Pregnancy & 60+) (1 - 1-dose 60+ series) Never done    DTAP/TDAP/TD IMMUNIZATION (2 - Td or Tdap) 11/30/2019    DIABETIC FOOT EXAM  04/29/2020    BMP  06/01/2024    LIPID  06/01/2024    MEDICARE ANNUAL WELLNESS VISIT  06/01/2024    EYE EXAM  06/20/2024    A1C  09/27/2024    MICROALBUMIN  06/05/2025    ANNUAL REVIEW OF HM ORDERS  06/05/2025    FALL RISK ASSESSMENT  06/05/2025    DEXA  12/10/2025    ADVANCE CARE PLANNING  06/05/2028    PHQ-2 (once per calendar year)  Completed     "INFLUENZA VACCINE  Completed    Pneumococcal Vaccine: 65+ Years  Completed    ZOSTER IMMUNIZATION  Completed    COVID-19 Vaccine  Completed    IPV IMMUNIZATION  Aged Out    HPV IMMUNIZATION  Aged Out    MENINGITIS IMMUNIZATION  Aged Out    RSV MONOCLONAL ANTIBODY  Aged Out    MAMMO SCREENING  Discontinued         Review of Systems  Constitutional, HEENT, cardiovascular, pulmonary, gi and gu systems are negative, except as otherwise noted.     Objective    Exam  /70 (BP Location: Left arm, Patient Position: Chair, Cuff Size: Adult Regular)   Pulse 79   Temp 98.3  F (36.8  C)   Resp 18   Ht 1.495 m (4' 10.85\")   Wt 64 kg (141 lb)   SpO2 98%   BMI 28.62 kg/m     Estimated body mass index is 28.62 kg/m  as calculated from the following:    Height as of this encounter: 1.495 m (4' 10.85\").    Weight as of this encounter: 64 kg (141 lb).    Physical Exam  GENERAL: alert and no distress  EYES: Eyes grossly normal to inspection, PERRL and conjunctivae and sclerae normal  HENT: ear canals and TM's normal, nose and mouth without ulcers or lesions  NECK: no adenopathy, no asymmetry, masses, or scars  RESP: lungs clear to auscultation - no rales, rhonchi or wheezes  CV: regular rate and rhythm, normal S1 S2, no S3 or S4, no murmur, click or rub, no peripheral edema  ABDOMEN: soft, nontender, no hepatosplenomegaly, no masses and bowel sounds normal  MS: no gross musculoskeletal defects noted, no edema  SKIN: no suspicious lesions or rashes  NEURO: Normal strength and tone, mentation intact and speech normal  BACK: no CVA tenderness, no paralumbar tenderness  PSYCH: mentation appears normal, affect normal/bright  LYMPH: no cervical, supraclavicular, axillary, or inguinal adenopathy  Vaginal exam is normal at this time   No bumps or swelling           6/5/2024   Mini Cog   Clock Draw Score 2 Normal   3 Item Recall 3 objects recalled   Mini Cog Total Score 5              Signed Electronically by: Satish Pena, " MD

## 2024-06-05 NOTE — PATIENT INSTRUCTIONS
Start with 10 units at nighttime  Check the fasting morning blood sugars.    If they are running  we have the right dose   If they are running high, add 2 units every 2 weeks until below 150  If they are running low, reduce by 2 units very 2 weeks until above 80

## 2024-06-06 LAB
CREAT UR-MCNC: 36.4 MG/DL
MICROALBUMIN UR-MCNC: <12 MG/L
MICROALBUMIN/CREAT UR: NORMAL MG/G{CREAT}

## 2024-06-11 ENCOUNTER — MYC MEDICAL ADVICE (OUTPATIENT)
Dept: FAMILY MEDICINE | Facility: CLINIC | Age: 89
End: 2024-06-11
Payer: MEDICARE

## 2024-08-05 ENCOUNTER — LAB (OUTPATIENT)
Dept: LAB | Facility: CLINIC | Age: 89
End: 2024-08-05
Payer: MEDICARE

## 2024-08-05 DIAGNOSIS — Z79.4 TYPE 2 DIABETES MELLITUS WITHOUT COMPLICATION, WITH LONG-TERM CURRENT USE OF INSULIN (H): ICD-10-CM

## 2024-08-05 DIAGNOSIS — E11.9 TYPE 2 DIABETES MELLITUS WITHOUT COMPLICATION, WITH LONG-TERM CURRENT USE OF INSULIN (H): ICD-10-CM

## 2024-08-05 DIAGNOSIS — I10 HTN, GOAL BELOW 150/90: ICD-10-CM

## 2024-08-05 LAB
ANION GAP SERPL CALCULATED.3IONS-SCNC: 10 MMOL/L (ref 7–15)
BUN SERPL-MCNC: 25.5 MG/DL (ref 8–23)
CALCIUM SERPL-MCNC: 9.7 MG/DL (ref 8.8–10.4)
CHLORIDE SERPL-SCNC: 101 MMOL/L (ref 98–107)
CHOLEST SERPL-MCNC: 148 MG/DL
CREAT SERPL-MCNC: 1.02 MG/DL (ref 0.51–0.95)
EGFRCR SERPLBLD CKD-EPI 2021: 53 ML/MIN/1.73M2
FASTING STATUS PATIENT QL REPORTED: NO
FASTING STATUS PATIENT QL REPORTED: NO
GLUCOSE SERPL-MCNC: 141 MG/DL (ref 70–99)
HBA1C MFR BLD: 6.7 % (ref 0–5.6)
HCO3 SERPL-SCNC: 30 MMOL/L (ref 22–29)
HDLC SERPL-MCNC: 53 MG/DL
LDLC SERPL CALC-MCNC: 70 MG/DL
NONHDLC SERPL-MCNC: 95 MG/DL
POTASSIUM SERPL-SCNC: 3.9 MMOL/L (ref 3.4–5.3)
SODIUM SERPL-SCNC: 141 MMOL/L (ref 135–145)
TRIGL SERPL-MCNC: 125 MG/DL

## 2024-08-05 PROCEDURE — 80048 BASIC METABOLIC PNL TOTAL CA: CPT

## 2024-08-05 PROCEDURE — 83036 HEMOGLOBIN GLYCOSYLATED A1C: CPT

## 2024-08-05 PROCEDURE — 36415 COLL VENOUS BLD VENIPUNCTURE: CPT

## 2024-08-05 PROCEDURE — 80061 LIPID PANEL: CPT

## 2024-09-20 ENCOUNTER — OFFICE VISIT (OUTPATIENT)
Dept: DERMATOLOGY | Facility: CLINIC | Age: 89
End: 2024-09-20
Payer: MEDICARE

## 2024-09-20 DIAGNOSIS — D22.9 MULTIPLE BENIGN NEVI: Primary | ICD-10-CM

## 2024-09-20 DIAGNOSIS — Z12.83 ENCOUNTER FOR SCREENING FOR MALIGNANT NEOPLASM OF SKIN: ICD-10-CM

## 2024-09-20 DIAGNOSIS — D18.01 CHERRY ANGIOMA: ICD-10-CM

## 2024-09-20 DIAGNOSIS — L82.1 SEBORRHEIC KERATOSES: ICD-10-CM

## 2024-09-20 DIAGNOSIS — L81.4 LENTIGINES: ICD-10-CM

## 2024-09-20 PROCEDURE — 99213 OFFICE O/P EST LOW 20 MIN: CPT | Performed by: NURSE PRACTITIONER

## 2024-09-20 NOTE — LETTER
9/20/2024      Pam Hartman  [address]  [facility] [address]      Dear Colleague,    Thank you for referring your patient, Pam Hartman, to the [facility]. Please see a copy of my visit note below.    [facility] Dermatology Note  Encounter Date: Sep 20, 2024  Office Visit     Reviewed patients past medical history and pertinent chart review prior to patients visit today.     Dermatology Problem List:  Patient denies personal history of skin cancer or dysplastic nevi.    Son had melanoma    ____________________________________________    Assessment & Plan:     # Benign skin findings including: seborrheic keratoses, cherry angioma, lentigines and benign nevi.   - No further intervention required. Patient to report changes.   - Patient reassured of the benign nature of these lesions.    #Signs and Symptoms of non-melanoma skin cancer and ABCDEs of melanoma reviewed with patient. Patient encouraged to perform monthly self skin exams and educated on how to perform them. UV precautions reviewed with patient. Patient was asked about new or changing moles/lesions on body.     #Reviewed Sunscreen: Apply 20 minutes prior to going outdoors and reapply every two hours, when wet or sweating. We recommend using an SPF 30 or higher, and to use one that is water resistant.       Follow-up:  1-2 years for follow up full body skin exam, prn for new or changing lesions or new concerns    Natasha Blair CNP  Dermatology     ____________________________________________    CC: Derm Problem (Left cheek- feels more raised over time)    HPI:  Ms. Pam Hartman is a(n) 89 year old female who presents today as a new patient for a full body skin cancer screening. Patient has concerns today about a new spot on her left cheek that is brown and a bit textured.  It is asymptomatic.    Patient is otherwise feeling well, without additional skin concerns.     Physical Exam:  Vitals:  There were no vitals taken for this visit.  SKIN: Total skin excluding the genitalia areas was performed. The exam included the head/face, neck, both arms, chest, back, abdomen, both legs, digits, mons pubis, buttock and nails.   -several 1-2mm red dome shaped symmetric papules scattered on the trunk  -multiple tan/brown flat round macules and raised papules scattered throughout trunk, extremities and head. No worrisome features for malignancy noted on examination.  -scattered tan, homogenous macules scattered on sun exposed areas of trunk, extremities and face.   -scattered waxy, stuck on tan/brown papules and patches on the trunk face and extremities (those noted in HPI)    - No other lesions of concern on areas examined.     Medications:  Current Outpatient Medications   Medication Sig Dispense Refill     Acetaminophen (TYLENOL PO) Take 2,000 mg by mouth daily        alcohol swab prep pads Use to swab area of injection/chitra as directed. 100 each 3     amLODIPine (NORVASC) 10 MG tablet TAKE 1 TABLET(10 MG) BY MOUTH DAILY 90 tablet 3     aspirin 81 MG tablet Take 1 tablet by mouth daily.       blood glucose (NO BRAND SPECIFIED) lancets standard Use to test blood sugar 1 times daily or as directed. 100 each 2     blood glucose (NO BRAND SPECIFIED) test strip Use to test blood sugar 1 times daily or as directed. 100 strip 2     blood glucose (NO BRAND SPECIFIED) test strip Use to test blood sugars 2 times daily or as directed 200 strip 3     blood glucose monitoring (NO BRAND SPECIFIED) meter device kit Use to test blood sugar 1 times daily or as directed. 1 kit 3     blood glucose monitoring (NO BRAND SPECIFIED) meter device kit Use to test blood sugar 2 times daily or as directed. 1 kit 0     calcipotriene (DOVONEX) 0.005 % SOLN solution Apply to scalp BID as needed 1 Bottle 4     calcium carbonate-vitamin D 500-400 MG-UNIT TABS tablt ONE BY MOUTH TWICE A  tablet 3     furosemide (LASIX) 20 MG tablet TAKE 2  TABLETS BY MOUTH EVERY  tablet 3     insulin glargine (LANTUS SOLOSTAR) 100 UNIT/ML pen Inject 10 Units Subcutaneous at bedtime 15 mL 3     insulin pen needle (ULTICARE MINI) 31G X 6 MM miscellaneous Use 1 pen needles daily or as directed. 100 each 3     losartan (COZAAR) 100 MG tablet TAKE 1 TABLET(100 MG) BY MOUTH DAILY 90 tablet 3     Probiotic Product (PROBIOTIC PO)        simvastatin (ZOCOR) 20 MG tablet TAKE 1/2 TABLET BY MOUTH EVERY NIGHT AT BEDTIME 45 tablet 2     thin (NO BRAND SPECIFIED) lancets Use with lanceting device. To accompany: Blood Glucose Monitor Brands: per insurance. 200 each 3     No current facility-administered medications for this visit.      Past Medical History:   Patient Active Problem List   Diagnosis     HYPERLIPIDEMIA LDL GOAL <100     Psoriasis     IBS (irritable bowel syndrome)     ASCUS on Pap smear     Type 2 diabetes, HbA1C goal < 8% (H)     Rotator cuff tear arthropathy     Chronic maxillary sinusitis     HTN, goal below 150/90     Type 2 diabetes mellitus without complication (H)     Past Medical History:   Diagnosis Date     Colon polyp      Degenerative arthritis      DM II (diabetes mellitus, type II)      HTN      Hyperlipidemia LDL goal < 100      IBS (irritable bowel syndrome)      Osteopenia      Psoriasis      Type 2 diabetes mellitus without complication (H) 10/21/2015     Ulnar neuropathy        CC Referred Self, MD  No address on file on close of this encounter.      Again, thank you for allowing me to participate in the care of your patient.        Sincerely,        Jazzy Blair, CHINEDU CNP

## 2024-09-20 NOTE — PROGRESS NOTES
[facility] Dermatology Note  Encounter Date: Sep 20, 2024  Office Visit     Reviewed patients past medical history and pertinent chart review prior to patients visit today.     Dermatology Problem List:  Patient denies personal history of skin cancer or dysplastic nevi.    Son had melanoma    ____________________________________________    Assessment & Plan:     # Benign skin findings including: seborrheic keratoses, cherry angioma, lentigines and benign nevi.   - No further intervention required. Patient to report changes.   - Patient reassured of the benign nature of these lesions.    #Signs and Symptoms of non-melanoma skin cancer and ABCDEs of melanoma reviewed with patient. Patient encouraged to perform monthly self skin exams and educated on how to perform them. UV precautions reviewed with patient. Patient was asked about new or changing moles/lesions on body.     #Reviewed Sunscreen: Apply 20 minutes prior to going outdoors and reapply every two hours, when wet or sweating. We recommend using an SPF 30 or higher, and to use one that is water resistant.       Follow-up:  1-2 years for follow up full body skin exam, prn for new or changing lesions or new concerns    Natasha Blair CNP  Dermatology     ____________________________________________    CC: Derm Problem (Left cheek- feels more raised over time)    HPI:  Ms. Pam Hartman is a(n) 89 year old female who presents today as a new patient for a full body skin cancer screening. Patient has concerns today about a new spot on her left cheek that is brown and a bit textured.  It is asymptomatic.    Patient is otherwise feeling well, without additional skin concerns.     Physical Exam:  Vitals: There were no vitals taken for this visit.  SKIN: Total skin excluding the genitalia areas was performed. The exam included the head/face, neck, both arms, chest, back, abdomen, both legs, digits, mons pubis, buttock and nails.   -several 1-2mm red  dome shaped symmetric papules scattered on the trunk  -multiple tan/brown flat round macules and raised papules scattered throughout trunk, extremities and head. No worrisome features for malignancy noted on examination.  -scattered tan, homogenous macules scattered on sun exposed areas of trunk, extremities and face.   -scattered waxy, stuck on tan/brown papules and patches on the trunk face and extremities (those noted in HPI)    - No other lesions of concern on areas examined.     Medications:  Current Outpatient Medications   Medication Sig Dispense Refill    Acetaminophen (TYLENOL PO) Take 2,000 mg by mouth daily       alcohol swab prep pads Use to swab area of injection/chitra as directed. 100 each 3    amLODIPine (NORVASC) 10 MG tablet TAKE 1 TABLET(10 MG) BY MOUTH DAILY 90 tablet 3    aspirin 81 MG tablet Take 1 tablet by mouth daily.      blood glucose (NO BRAND SPECIFIED) lancets standard Use to test blood sugar 1 times daily or as directed. 100 each 2    blood glucose (NO BRAND SPECIFIED) test strip Use to test blood sugar 1 times daily or as directed. 100 strip 2    blood glucose (NO BRAND SPECIFIED) test strip Use to test blood sugars 2 times daily or as directed 200 strip 3    blood glucose monitoring (NO BRAND SPECIFIED) meter device kit Use to test blood sugar 1 times daily or as directed. 1 kit 3    blood glucose monitoring (NO BRAND SPECIFIED) meter device kit Use to test blood sugar 2 times daily or as directed. 1 kit 0    calcipotriene (DOVONEX) 0.005 % SOLN solution Apply to scalp BID as needed 1 Bottle 4    calcium carbonate-vitamin D 500-400 MG-UNIT TABS tablt ONE BY MOUTH TWICE A  tablet 3    furosemide (LASIX) 20 MG tablet TAKE 2 TABLETS BY MOUTH EVERY  tablet 3    insulin glargine (LANTUS SOLOSTAR) 100 UNIT/ML pen Inject 10 Units Subcutaneous at bedtime 15 mL 3    insulin pen needle (ULTICARE MINI) 31G X 6 MM miscellaneous Use 1 pen needles daily or as directed. 100 each 3     losartan (COZAAR) 100 MG tablet TAKE 1 TABLET(100 MG) BY MOUTH DAILY 90 tablet 3    Probiotic Product (PROBIOTIC PO)       simvastatin (ZOCOR) 20 MG tablet TAKE 1/2 TABLET BY MOUTH EVERY NIGHT AT BEDTIME 45 tablet 2    thin (NO BRAND SPECIFIED) lancets Use with lanceting device. To accompany: Blood Glucose Monitor Brands: per insurance. 200 each 3     No current facility-administered medications for this visit.      Past Medical History:   Patient Active Problem List   Diagnosis    HYPERLIPIDEMIA LDL GOAL <100    Psoriasis    IBS (irritable bowel syndrome)    ASCUS on Pap smear    Type 2 diabetes, HbA1C goal < 8% (H)    Rotator cuff tear arthropathy    Chronic maxillary sinusitis    HTN, goal below 150/90    Type 2 diabetes mellitus without complication (H)     Past Medical History:   Diagnosis Date    Colon polyp     Degenerative arthritis     DM II (diabetes mellitus, type II)     HTN     Hyperlipidemia LDL goal < 100     IBS (irritable bowel syndrome)     Osteopenia     Psoriasis     Type 2 diabetes mellitus without complication (H) 10/21/2015    Ulnar neuropathy        CC Referred Self, MD  No address on file on close of this encounter.

## 2024-09-24 NOTE — TELEPHONE ENCOUNTER
Dr Narvaez please see note below .   Routing refill request to provider for review/approval because:  LDL and appointment needed          Pending Prescriptions:                       Disp   Refills    simvastatin (ZOCOR) 20 MG tablet [Pharmacy*45 tab*3        Sig: TAKE 1/2 TABLET BY MOUTH EVERY NIGHT AT BEDTIME        Jose Daniel Nevarez RN

## 2025-02-08 ENCOUNTER — HEALTH MAINTENANCE LETTER (OUTPATIENT)
Age: 89
End: 2025-02-08

## 2025-03-19 DIAGNOSIS — E11.9 TYPE 2 DIABETES MELLITUS WITHOUT COMPLICATION, WITHOUT LONG-TERM CURRENT USE OF INSULIN (H): ICD-10-CM

## 2025-03-20 RX ORDER — LANCETS
EACH MISCELLANEOUS
Qty: 200 EACH | Refills: 2 | Status: SHIPPED | OUTPATIENT
Start: 2025-03-20

## 2025-04-13 DIAGNOSIS — R60.1 GENERALIZED EDEMA: ICD-10-CM

## 2025-04-13 DIAGNOSIS — E78.5 HYPERLIPIDEMIA LDL GOAL <100: ICD-10-CM

## 2025-04-13 DIAGNOSIS — E11.9 TYPE 2 DIABETES MELLITUS WITHOUT COMPLICATION, WITHOUT LONG-TERM CURRENT USE OF INSULIN (H): ICD-10-CM

## 2025-04-13 DIAGNOSIS — I10 ESSENTIAL HYPERTENSION WITH GOAL BLOOD PRESSURE LESS THAN 140/90: ICD-10-CM

## 2025-04-14 RX ORDER — LOSARTAN POTASSIUM 100 MG/1
100 TABLET ORAL
Qty: 90 TABLET | Refills: 3 | Status: SHIPPED | OUTPATIENT
Start: 2025-04-14

## 2025-04-14 RX ORDER — SIMVASTATIN 20 MG
TABLET ORAL
Qty: 45 TABLET | Refills: 0 | Status: SHIPPED | OUTPATIENT
Start: 2025-04-14

## 2025-04-14 RX ORDER — AMLODIPINE BESYLATE 10 MG/1
10 TABLET ORAL
Qty: 90 TABLET | Refills: 3 | Status: SHIPPED | OUTPATIENT
Start: 2025-04-14

## 2025-04-14 RX ORDER — FUROSEMIDE 20 MG/1
40 TABLET ORAL
Qty: 180 TABLET | Refills: 3 | Status: SHIPPED | OUTPATIENT
Start: 2025-04-14

## 2025-04-17 NOTE — TELEPHONE ENCOUNTER
Central Prior Authorization Team   Phone: [phone]      PA Initiation    Medication: DICLOFENAC SODIUM 1% GEL -Initiated  Insurance Company: Abelardo Keenan - Phone [phone] Fax [phone]  Pharmacy Filling the Rx: [id number] - [place], [place] - [address]  Filling Pharmacy Phone: [phone]  Filling Pharmacy Fax:    Start Date: 8/23/2018    Manually faxed in PA forms to Harshad at [phone]   Palliative Care- Social Work/Event Note Post Acute Skilled Nursing Home Initial Visit Note     Date of Service: 4/17/2025  Location seen at: [facility]    PCP: Khadar Cuevas MD   Patient Care Team:  Khadar Cuevas MD as PCP - General (Internal Medicine)  Pooja Reynaga MD as Post Acute Facility Provider: Physician (Internal Medicine)  Gena Doan CNP as Post Acute Facility Provider: APC (Nurse Practitioner - Family)  Seen by Gena Doan CNP today    History of Present Illness: Marie Soares is a 83 year old female presenting to Post Acute Skilled Nursing for: rehab s/p laminectomy.     83 year old female with PMHx of HTN, HLD, heart failure, PE on Eliquis (no PE on CT chest in 2024), hypothyroidism, hx of GI bleed, hx of lumbar stenosis s/p T12-L3 fusion (multiple surgeries [id number] at OSH) who presented to [facility] as a transfer from OSH for back pain.       NSG was on consult.   4/5 CT lumbar showing no acute fractures. Post op changes from T12-L3 fusion with intact hardware. Degenerative changes from L4-S1.   4/7 MRI lumbar with expected postop changes, foraminal narrowing severe on the left from L2-4 and bilaterally at L5-S1.  She underwent T11-L1 laminectomy, removal of T12 instrumentation, revision right discectomy L5-S1 4/9/25 by Dr. Charles.        Post op with acute blood loss anemia.  She was transfused 1 unit pRBC.   Also found with leukocytosis, downtrended to 11.5.  No signs of infection.  No other issues.  Stabilized and cleared for discharge to SNF.      Patient seen laying in bed, awake and alert x3, NAD.  She c/o some low back pain, just got pain meds.  Denies any other pain, no SOB, no CP.  Denies abdominal pain, denies n/v/d, no constipation, had BM 2 days ago.    Lives in 1st floor apartment, has caregiver.  Has walker and wheelchair at home.        HISTORY  Past Medical History:   Diagnosis Date   • Anemia    • Anxiety disorder    • Back pain    • Blood clot associated with vein wall inflammation    • Chronic  Transfer Note downgrade/Transfer Note Dietitian Consult - [facility]/Nutrition Services Event Note ischemic heart disease    • Chronic pain    • Congestive cardiac failure  (CMD)    • COPD (chronic obstructive pulmonary disease)  (CMD)    • Depression    • Fracture     R foot   • Gastroesophageal reflux disease    • Glaucoma    • HLD (hyperlipidemia)    • HTN (hypertension)    • Hypokalemia    • Neuromuscular dysfunction of bladder    • OA (osteoarthritis)    • Spinal stenosis    • Urinary incontinence    • Urinary tract infection    • Vitamin D deficiency       reports that she quit smoking about 11 years ago. Her smoking use included cigarettes. She started smoking about 44 years ago. She has a 16.5 pack-year smoking history. She has been exposed to tobacco smoke. She has never used smokeless tobacco. She reports that she does not currently use alcohol. She reports that she does not use drugs.  Past Surgical History:   Procedure Laterality Date   • Back surgery      lumbar three surgeries 12/2015 1/2015 7/2014   • Bunionectomy Left    • Cath place for coronary angiography w md sup - interp graft & rt heart  2017   • Hysterectomy     • Incision and drainage Left 08/27/2024    INCISION AND DRAINAGE, EXTREMITY LEFT FOOT   • Open access colonoscopy     • Remv 2nd cataract,corn-scler sectn Bilateral 2009   • Tonsillectomy     • Total knee arthroplasty Right 2020     Family History   Problem Relation Age of Onset   • Cancer Mother    • Hypertension Mother    • Hypertension Father    • Motor Vehicle Accident Father    • Rheumatoid Arthritis Sister    • Cancer Brother    • COPD Brother    • Motor Vehicle Accident Son      History     Not marked as reviewed during this visit.          PROBLEM LIST:  Patient Active Problem List   Diagnosis   • Fall in home   • Essential hypertension, benign   • Hyperlipidemia LDL goal <100   • Diabetes mellitus, type 2  (CMD)   • Asthma (CMD)   • Chronic obstructive pulmonary disease (COPD)  (CMD)   • Chronic diastolic congestive heart failure  (CMD)   • Spinal stenosis of lumbar region  without neurogenic claudication   • VTE (venous thromboembolism)   • Obesity   • Mixed stress and urge urinary incontinence   • Near syncope   • Stage 3a chronic kidney disease  (CMD)   • Acquired hypothyroidism   • Gastritis with hemorrhage   • Edema of left lower extremity   • Current use of long term anticoagulation   • CHF (congestive heart failure), NYHA class I, acute on chronic, combined  (CMD)   • Polypharmacy   • Swelling of left foot   • Dizziness   • History of arteriovenous malformation (AVM)   • Debility   • Cellulitis   • UTI (urinary tract infection)   • Localized swelling of left foot   • Mucus plugging of bronchi   • Frailty   • Pneumonia due to infectious organism   • Chronic midline low back pain without sciatica   • Back pain at L4-L5 level   • History of pulmonary embolism       ADVANCE CARE PLANNING:  Advance Care Planning      Location: [facility]   Advance care planning documents on file - yes    Advance care planning discussion offered to participants: Gena Doan CNP, Patient consented to discussion. Time spent on discussion was 18 minutes.     GOALS OF CARE NOTE    A Goals of Care discussion was held for the following reasons: goals of care discussion requested. Those present have a good understanding of the patient's situation.   Patient-Centered Goals: Prolong survival, only if quality of life is meaningful.  Code Status: Full Resuscitation             FRAILTY SCREENING:       ADVANCED ILLNESS SCREENING:       DEPRESSION SCREENING:  Recent PHQ 2/9 Score    PHQ 2:  PHQ 2 Score Adult PHQ 2 Score Adult PHQ 2 Interpretation Little interest or pleasure in activity?   4/6/2025   5:54 PM 2 No further screening needed 1       PHQ 9:       DEPRESSION ASSESSMENT/PLAN:  PAN and CAV Depression Follow-up 2024: Depression screening is negative no further plan needed.    ALLERGIES:  Allergies as of 04/17/2025 - Reviewed 04/09/2025   Allergen Reaction Noted   • Adhesive    (environmental) ANAPHYLAXIS and RASH 12/23/2014   • Tomato   (food or med) ANAPHYLAXIS, RASH, and SWELLING 12/19/2019   • Beans   (food) HIVES 05/16/2024   • Penicillins SWELLING 12/01/2009       CURRENT MEDICATIONS:   Current Outpatient Medications   Medication Sig Dispense Refill   • polyethylene glycol (MIRALAX) 17 g packet Take 17 g by mouth daily. Stir and dissolve powder in any 4 to 8 ounces of beverage, then drink.     • Heparin Sodium, Porcine, (heparin, porcine,) 5000 UNIT/ML injectable solution Inject 1 mL into the skin every 8 hours. Only for dvt prophylaxis while not as ambulatory. Can be discontinued when more ambulatory or when apixaban resumed     • gabapentin (NEURONTIN) 300 MG capsule Take 1 capsule by mouth every 8 hours. 90 capsule 0   • docusate sodium-sennosides (SENOKOT S) 50-8.6 MG per tablet Take 2 tablets by mouth nightly.     • pantoprazole (PROTONIX) 40 MG tablet Take 1 tablet by mouth daily (before breakfast).     • ferrous sulfate 325 (65 FE) MG tablet Take 1 tablet by mouth in the morning and 1 tablet in the evening. Take with meals.     • furosemide (LASIX) 20 MG tablet Take 1 tablet by mouth daily.     • isosorbide dinitrate (ISORDIL) 30 MG tablet Take 1 tablet by mouth in the morning and 1 tablet in the evening.     • oxyCODONE-acetaminophen (PERCOCET) 5-325 MG per tablet Take 1 tablet by mouth every 4 hours as needed for Pain. 14 tablet 0   • magnesium hydroxide (MILK OF MAGNESIA) 400 MG/5ML suspension Take 30 mLs by mouth daily as needed for Constipation.     • diclofenac (VOLTAREN) 1 % gel Apply topically daily as needed (pain).     • Blood Pressure Monitoring (Blood Pressure Monitor Automat) Device 1 Device daily. 1 each 0   • Alcohol Swabs (Pro Comfort Alcohol) 70 % Pads USE UP TO 8 PADS/DAY FOR INSULIN SHOTS 100 each 2   • naLOXone (NARCAN) 4 MG/0.1ML nasal liquid Spray the content of 1 device into 1 nostril. Call 911. May repeat with 2nd device in alternate nostril if no  response in 2-3 minutes. 2 each 1   • Blood Glucose Monitoring Suppl w/Device Kit Use as directed 1 kit 0   • Lancets (OneTouch Delica Plus [id number]) Misc TEST 1 TO 2 TIMES A DAY AS DIRECTED 100 each 0   • blood glucose (OneTouch Verio) test strip TEST 1 TO 2 TIMES A DAY AS DIRECTED 100 each 0   • folic acid (FOLATE) 1 MG tablet TAKE 1 TABLET BY MOUTH ONCE DAILY 30 tablet 4   • cyclobenzaprine (FLEXERIL) 5 MG tablet Take 1 tablet by mouth 3 times daily as needed for Muscle spasms. 90 tablet 2   • [Paused] metoPROLOL tartrate (LOPRESSOR) 25 MG tablet TAKE ONE TABLET BY MOUTH TWO TIME A DAY FOR BLOOD PRESSURE 60 tablet 4   • dorzolamide (TRUSOPT) 2 % ophthalmic solution INSTILL 1 DROP IN BOTH EYES ONE TIME A DAY FOR GLAUCOMA 10 mL 4   • levothyroxine 25 MCG tablet TAKE ONE TABLET BY MOUTH EVERY MORNING 30 tablet 4   • oxybutynin (DITROPAN) 5 MG tablet Take 1 tablet by mouth in the morning and 1 tablet in the evening. 180 tablet 3     No current facility-administered medications for this visit.     Medications reviewed / reconciled: Yes    BASELINE FUNCTIONAL STATUS:  Walker    CURRENT FUNCTIONAL STATUS:  Weight bearing as tolerated (WBAT) and Walker    DIET:  Appetite: Normal    REVIEW OF SYSTEMS:  Review of Systems   Constitutional:  Positive for activity change.   Respiratory: Negative.     Cardiovascular: Negative.    Gastrointestinal: Negative.    Genitourinary: Negative.    Musculoskeletal:  Positive for arthralgias, back pain and gait problem.       VITALS:  Visit Vitals  BP (!) 141/87   Pulse 76   Temp 98.2 °F (36.8 °C)   Resp 18   SpO2 98%       PAIN SCORE:  There were no vitals filed for this visit.    PHYSICAL ASSESSMENT:  Physical Exam  HENT:      Head: Normocephalic.   Cardiovascular:      Rate and Rhythm: Normal rate and regular rhythm.      Pulses: Normal pulses.   Pulmonary:      Effort: Pulmonary effort is normal. No respiratory distress.      Breath sounds: Normal breath sounds.   Abdominal:       General: Bowel sounds are normal.      Palpations: Abdomen is soft.      Tenderness: There is no abdominal tenderness. There is no guarding.   Musculoskeletal:         General: Normal range of motion.   Skin:     General: Skin is warm and dry.      Comments: Lower back with surgical dressing in place, no discharge noted   Neurological:      General: No focal deficit present.      Mental Status: She is alert and oriented to person, place, and time.   Psychiatric:         Mood and Affect: Mood normal.         LABS:  CBC:   WBC (K/mcL)   Date Value   04/16/2025 11.4 (H)     RBC (mil/mcL)   Date Value   04/16/2025 2.67 (L)     HGB (g/dL)   Date Value   04/16/2025 7.6 (L)     PLT (K/mcL)   Date Value   04/16/2025 413   , BMP:   Sodium (mmol/L)   Date Value   04/16/2025 138     Potassium (mmol/L)   Date Value   04/16/2025 4.4     Chloride (mmol/L)   Date Value   04/16/2025 106     Glucose (mg/dL)   Date Value   04/16/2025 110 (H)     Calcium (mg/dL)   Date Value   04/16/2025 9.3     Carbon Dioxide (mmol/L)   Date Value   04/16/2025 26     BUN (mg/dL)   Date Value   04/16/2025 21 (H)     Creatinine (mg/dL)   Date Value   04/16/2025 1.06 (H)   , and LIPID Panel:   Cholesterol (mg/dL)   Date Value   02/17/2025 196     HDL (mg/dL)   Date Value   02/17/2025 72     Cholesterol/ HDL Ratio (no units)   Date Value   02/17/2025 2.7     Triglycerides (mg/dL)   Date Value   02/17/2025 86     LDL (mg/dL)   Date Value   02/17/2025 107       Last A1C Test:      Date: 2/17/2025      Value: 4.5  Last Lab A1C:  Hemoglobin A1C (%)   Date Value   02/17/2025 4.5         ASSESSMENT AND PLAN    Lumbar stenosis s/p lumbar fusion in past  Acute on chronic low back pain  -now s/p T11-L1 laminectomy, removal of T12 instrumentation, revision right discectomy L5-S1 4/9/25  -Hold home Eliquis x 3 weeks post op, restart 4/30  -continue heparin for dvt ppx  -Incision care: daily dressing change  -Pain control with percocet, gabapentin,  flexeril  -Activity as tolerated, PT/OT  -bowel regimen  -f/u with Dr. Melvin GLASGOW 4/24 for suture removal    HTN  -continue lasix, losartan, metoprolol, nifedipine  -monitor BP    HFpEF  -EF of 58% on TTE 5/25/24  -continue lasix, metoprolol  -monitor weights  -f/u with Card    H/o PE  -eliquis on hold for 3 weeks post op, restart 4/30    Hypothyroidism  -continue synthroid    Gluteal cleft wound  -present prior to hospitalization  -wound team to follow    Debility  -pt/ot  -wbat  -fall precautions          FOLLOW UP APPOINTMENTS:  Future Appointments   Date Time Provider Department Center   4/24/2025 11:00 AM Anthony Charles MD [id number] [id number]   9/16/2025 10:20 AM Celeste Gould MD [id number] [facility]       Discussed with: RN / Nursing, Patient, SW/, PT, and OT  Prognosis: fair    Total time spent is 65 minutes. Time spent in:   Discussion of plan of care with the patient/family/staff regarding: Prognosis  Risks  Benefits of treatment(s)  Instructions  Compliance  Risk reduction  Preparing to see the patient (eg, review of tests).  Obtaining and/or reviewing separately obtained history.  Performing a medically appropriate examination and/or evaluation.  Counseling and educating the patient/family/caregiver.  Ordering medications, tests, or procedures.  Referring and communicating with other health care professionals (when not separately reported).  Documenting clinical information in electronic or other health record.  Independently interpreting results (not separately reported) and communicating results to the patient/family/caregiver.  Care Coordination (not separately reported).    Electronically signed by: Gena Doan, RAUL  4/17/2025

## 2025-05-06 ENCOUNTER — PATIENT OUTREACH (OUTPATIENT)
Dept: CARE COORDINATION | Facility: CLINIC | Age: 89
End: 2025-05-06
Payer: MEDICARE

## 2025-06-25 ENCOUNTER — DOCUMENTATION ONLY (OUTPATIENT)
Dept: FAMILY MEDICINE | Facility: CLINIC | Age: 89
End: 2025-06-25
Payer: MEDICARE

## 2025-06-25 DIAGNOSIS — E11.9 TYPE 2 DIABETES MELLITUS WITHOUT COMPLICATION, WITHOUT LONG-TERM CURRENT USE OF INSULIN (H): Primary | ICD-10-CM

## 2025-06-29 DIAGNOSIS — Z79.4 TYPE 2 DIABETES MELLITUS WITHOUT COMPLICATION, WITH LONG-TERM CURRENT USE OF INSULIN (H): ICD-10-CM

## 2025-06-29 DIAGNOSIS — E11.9 TYPE 2 DIABETES MELLITUS WITHOUT COMPLICATION, WITH LONG-TERM CURRENT USE OF INSULIN (H): ICD-10-CM

## 2025-06-30 RX ORDER — INSULIN GLARGINE 100 [IU]/ML
10 INJECTION, SOLUTION SUBCUTANEOUS AT BEDTIME
Qty: 15 ML | Refills: 3 | Status: SHIPPED | OUTPATIENT
Start: 2025-06-30

## 2025-07-02 DIAGNOSIS — Z79.4 TYPE 2 DIABETES MELLITUS WITHOUT COMPLICATION, WITH LONG-TERM CURRENT USE OF INSULIN (H): ICD-10-CM

## 2025-07-02 DIAGNOSIS — E11.9 TYPE 2 DIABETES MELLITUS WITHOUT COMPLICATION, WITH LONG-TERM CURRENT USE OF INSULIN (H): ICD-10-CM

## 2025-07-02 RX ORDER — FLURBIPROFEN SODIUM 0.3 MG/ML
SOLUTION/ DROPS OPHTHALMIC
Qty: 100 EACH | Refills: 0 | Status: SHIPPED | OUTPATIENT
Start: 2025-07-02

## 2025-07-06 SDOH — HEALTH STABILITY: PHYSICAL HEALTH: ON AVERAGE, HOW MANY DAYS PER WEEK DO YOU ENGAGE IN MODERATE TO STRENUOUS EXERCISE (LIKE A BRISK WALK)?: 2 DAYS

## 2025-07-06 SDOH — HEALTH STABILITY: PHYSICAL HEALTH: ON AVERAGE, HOW MANY MINUTES DO YOU ENGAGE IN EXERCISE AT THIS LEVEL?: 20 MIN

## 2025-07-06 ASSESSMENT — SOCIAL DETERMINANTS OF HEALTH (SDOH): HOW OFTEN DO YOU GET TOGETHER WITH FRIENDS OR RELATIVES?: TWICE A WEEK

## 2025-07-07 ENCOUNTER — LAB (OUTPATIENT)
Dept: LAB | Facility: CLINIC | Age: 89
End: 2025-07-07
Payer: MEDICARE

## 2025-07-07 DIAGNOSIS — E11.9 TYPE 2 DIABETES MELLITUS WITHOUT COMPLICATION, WITHOUT LONG-TERM CURRENT USE OF INSULIN (H): ICD-10-CM

## 2025-07-07 LAB
ANION GAP SERPL CALCULATED.3IONS-SCNC: 11 MMOL/L (ref 7–15)
BUN SERPL-MCNC: 20.5 MG/DL (ref 8–23)
CALCIUM SERPL-MCNC: 10 MG/DL (ref 8.8–10.4)
CHLORIDE SERPL-SCNC: 102 MMOL/L (ref 98–107)
CHOLEST SERPL-MCNC: 155 MG/DL
CREAT SERPL-MCNC: 1.13 MG/DL (ref 0.51–0.95)
CREAT UR-MCNC: 155 MG/DL
EGFRCR SERPLBLD CKD-EPI 2021: 47 ML/MIN/1.73M2
EST. AVERAGE GLUCOSE BLD GHB EST-MCNC: 143 MG/DL
FASTING STATUS PATIENT QL REPORTED: YES
FASTING STATUS PATIENT QL REPORTED: YES
GLUCOSE SERPL-MCNC: 135 MG/DL (ref 70–99)
HBA1C MFR BLD: 6.6 % (ref 0–5.6)
HCO3 SERPL-SCNC: 28 MMOL/L (ref 22–29)
HDLC SERPL-MCNC: 46 MG/DL
LDLC SERPL CALC-MCNC: 78 MG/DL
MICROALBUMIN UR-MCNC: 90.6 MG/L
MICROALBUMIN/CREAT UR: 58.45 MG/G CR (ref 0–25)
NONHDLC SERPL-MCNC: 109 MG/DL
POTASSIUM SERPL-SCNC: 4.8 MMOL/L (ref 3.4–5.3)
SODIUM SERPL-SCNC: 141 MMOL/L (ref 135–145)
TRIGL SERPL-MCNC: 153 MG/DL

## 2025-07-07 PROCEDURE — 82043 UR ALBUMIN QUANTITATIVE: CPT

## 2025-07-07 PROCEDURE — 82570 ASSAY OF URINE CREATININE: CPT

## 2025-07-07 PROCEDURE — 80061 LIPID PANEL: CPT

## 2025-07-07 PROCEDURE — 80048 BASIC METABOLIC PNL TOTAL CA: CPT

## 2025-07-07 PROCEDURE — 36415 COLL VENOUS BLD VENIPUNCTURE: CPT

## 2025-07-07 PROCEDURE — 83036 HEMOGLOBIN GLYCOSYLATED A1C: CPT

## 2025-07-11 ENCOUNTER — OFFICE VISIT (OUTPATIENT)
Dept: FAMILY MEDICINE | Facility: CLINIC | Age: 89
End: 2025-07-11
Payer: MEDICARE

## 2025-07-11 VITALS
HEART RATE: 73 BPM | RESPIRATION RATE: 20 BRPM | TEMPERATURE: 97.2 F | BODY MASS INDEX: 29.84 KG/M2 | HEIGHT: 59 IN | SYSTOLIC BLOOD PRESSURE: 142 MMHG | OXYGEN SATURATION: 98 % | DIASTOLIC BLOOD PRESSURE: 70 MMHG | WEIGHT: 148 LBS

## 2025-07-11 DIAGNOSIS — N18.31 CHRONIC KIDNEY DISEASE, STAGE 3A (H): ICD-10-CM

## 2025-07-11 DIAGNOSIS — Z23 NEED FOR TDAP VACCINATION: ICD-10-CM

## 2025-07-11 DIAGNOSIS — Z79.4 TYPE 2 DIABETES MELLITUS WITHOUT COMPLICATION, WITH LONG-TERM CURRENT USE OF INSULIN (H): Primary | ICD-10-CM

## 2025-07-11 DIAGNOSIS — Z23 NEED FOR VACCINATION: ICD-10-CM

## 2025-07-11 DIAGNOSIS — Z00.00 ENCOUNTER FOR MEDICARE ANNUAL WELLNESS EXAM: ICD-10-CM

## 2025-07-11 DIAGNOSIS — E11.9 TYPE 2 DIABETES MELLITUS WITHOUT COMPLICATION, WITH LONG-TERM CURRENT USE OF INSULIN (H): Primary | ICD-10-CM

## 2025-07-11 PROCEDURE — 1126F AMNT PAIN NOTED NONE PRSNT: CPT | Performed by: INTERNAL MEDICINE

## 2025-07-11 PROCEDURE — 3077F SYST BP >= 140 MM HG: CPT | Performed by: INTERNAL MEDICINE

## 2025-07-11 PROCEDURE — G0439 PPPS, SUBSEQ VISIT: HCPCS | Performed by: INTERNAL MEDICINE

## 2025-07-11 PROCEDURE — 3078F DIAST BP <80 MM HG: CPT | Performed by: INTERNAL MEDICINE

## 2025-07-11 ASSESSMENT — PAIN SCALES - GENERAL: PAINLEVEL_OUTOF10: NO PAIN (0)

## 2025-07-11 NOTE — PROGRESS NOTES
"Preventive Care Visit  [facility] ELIAZAR Pena MD, Internal Medicine - Pediatrics  Jul 11, 2025      Assessment & Plan   Problem List Items Addressed This Visit       Type 2 diabetes mellitus without complication (H) - Primary    Relevant Orders    Adult Eye  Referral     Other Visit Diagnoses         Need for vaccination          Need for Tdap vaccination          Encounter for Medicare annual wellness exam                   BMI  Estimated body mass index is 30.04 kg/m  as calculated from the following:    Height as of this encounter: 1.495 m (4' 10.85\").    Weight as of this encounter: 67.1 kg (148 lb).   Weight management plan: Discussed healthy diet and exercise guidelines    Counseling  Appropriate preventive services were addressed with this patient via screening, questionnaire, or discussion as appropriate for fall prevention, nutrition, physical activity, Tobacco-use cessation, social engagement, weight loss and cognition.  Checklist reviewing preventive services available has been given to the patient.  Reviewed preventive health counseling, as reflected in patient instructions       Regular exercise       Healthy diet/nutrition       Vision screening       Hearing screening        Subjective   Pam is a 89 year old, presenting for the following:  Well Child        7/11/2025     9:26 AM   Additional Questions   Roomed by Natasha CABRERA  Weight gain, elevated blood sugar , insulin at 12 units    Exercise   Keeping them elevated   No alchohol  Salt is watching already           Advance Care Planning    Discussed advance care planning with patient; informed AVS has link to [employer].        7/6/2025   General Health   How would you rate your overall physical health? Excellent   Feel stress (tense, anxious, or unable to sleep) Not at all         7/6/2025   Nutrition   Diet: Low salt         7/6/2025   Exercise   Days per week of moderate/strenous exercise 2 days "   Average minutes spent exercising at this level 20 min   (!) EXERCISE CONCERN      7/6/2025   Social Factors   Frequency of gathering with friends or relatives Twice a week   Worry food won't last until get money to buy more No   Food not last or not have enough money for food? No   Do you have housing? (Housing is defined as stable permanent housing and does not include staying outside in a car, in a tent, in an abandoned building, in an overnight shelter, or couch-surfing.) Yes   Are you worried about losing your housing? No   Lack of transportation? No   Unable to get utilities (heat,electricity)? No         7/6/2025   Fall Risk   Fallen 2 or more times in the past year? No   Trouble with walking or balance? No          7/6/2025   Activities of Daily Living- Home Safety   Needs help with the following daily activites None of the above   Safety concerns in the home None of the above         7/6/2025   Dental   Dentist two times every year? Yes         7/6/2025   Hearing Screening   Hearing concerns? None of the above         7/6/2025   Driving Risk Screening   Patient/family members have concerns about driving No         7/6/2025   General Alertness/Fatigue Screening   Have you been more tired than usual lately? No         7/6/2025   Urinary Incontinence Screening   Bothered by leaking urine in past 6 months Yes         Today's PHQ-2 Score:       7/11/2025     9:27 AM   PHQ-2 ( 1999 Pf[employer])   Q1: Little interest or pleasure in doing things 0   Q2: Feeling down, depressed or hopeless 0   PHQ-2 Score 0           7/6/2025   Substance Use   Alcohol more than 3/day or more than 7/wk Not Applicable   Do you have a current opioid prescription? No   How severe/bad is pain from 1 to 10? 1/10   Do you use any other substances recreationally? No     Social History     Tobacco Use    Smoking status: Former     Current packs/day: 0.00     Average packs/day: 0.5 packs/day for 16.0 years (8.0 ttl pk-yrs)     Types: Cigarettes      Start date: 1964     Quit date: 1980     Years since quittin.6     Passive exposure: Past    Smokeless tobacco: Never   Vaping Use    Vaping status: Never Used   Substance Use Topics    Alcohol use: Not Currently     Comment: 2 DRINKS PER MONTH    Drug use: No          Mammogram Screening - After age 74- determine frequency with patient based on health status, life expectancy and patient goals          Reviewed and updated as needed this visit by Provider                    Lab work is in process  Labs reviewed in EPIC  BP Readings from Last 3 Encounters:   25 (!) 142/70   24 132/70   24 (!) 150/77    Wt Readings from Last 3 Encounters:   25 67.1 kg (148 lb)   24 64 kg (141 lb)   24 64 kg (141 lb)                  Patient Active Problem List   Diagnosis    HYPERLIPIDEMIA LDL GOAL <100    Psoriasis    IBS (irritable bowel syndrome)    ASCUS on Pap smear    Type 2 diabetes, HbA1C goal < 8% (H)    Rotator cuff tear arthropathy    Chronic maxillary sinusitis    HTN, goal below 150/90    Type 2 diabetes mellitus without complication (H)     Past Surgical History:   Procedure Laterality Date    EYE SURGERY  2015    Left and right cataract repair    REVERSE ARTHROPLASTY SHOULDER Right 2017    Nahun Skinner MD at [facility]    SEPTOPLASTY  2014    Procedure: SEPTOPLASTY;  Septoplasty, Left ethmoidectomy, Left endoscopic maxillary antrostomy, right kike bullosa;  Surgeon: Sarah Garcia MD;  Location:  OR    TONSILLECTOMY & ADENOIDECTOMY         Social History     Tobacco Use    Smoking status: Former     Current packs/day: 0.00     Average packs/day: 0.5 packs/day for 16.0 years (8.0 ttl pk-yrs)     Types: Cigarettes     Start date: 1964     Quit date: 1980     Years since quittin.6     Passive exposure: Past    Smokeless tobacco: Never   Substance Use Topics    Alcohol use: Not Currently     Comment: 2 DRINKS PER MONTH      Family History   Problem Relation Age of Onset    Macular Degeneration Mother     Cerebrovascular Disease Father     Melanoma Son     Glaucoma No family hx of          Current Outpatient Medications   Medication Sig Dispense Refill    ACCU-CHEK GUIDE TEST test strip TEST BLOOD SUGAR 1 TIME OR AS DIRECTED 100 strip 2    Acetaminophen (TYLENOL PO) Take 2,000 mg by mouth daily       alcohol swab prep pads Use to swab area of injection/chitra as directed. 100 each 3    amLODIPine (NORVASC) 10 MG tablet TAKE 1 TABLET(10 MG) BY MOUTH DAILY 90 tablet 3    aspirin 81 MG tablet Take 1 tablet by mouth daily.      blood glucose (NO BRAND SPECIFIED) lancets standard Use to test blood sugar 1 times daily or as directed. 100 each 2    blood glucose (NO BRAND SPECIFIED) test strip Use to test blood sugars 2 times daily or as directed 200 strip 3    blood glucose monitoring (NO BRAND SPECIFIED) meter device kit Use to test blood sugar 1 times daily or as directed. 1 kit 3    blood glucose monitoring (SOFTCLIX) lancets USE TO TEST BLOOD SUGAR 1 TIMES EVERY  each 2    calcipotriene (DOVONEX) 0.005 % SOLN solution Apply to scalp BID as needed 1 Bottle 4    calcium carbonate-vitamin D 500-400 MG-UNIT TABS tablt ONE BY MOUTH TWICE A  tablet 3    furosemide (LASIX) 20 MG tablet TAKE 2 TABLETS BY MOUTH EVERY  tablet 3    insulin pen needle (ULTICARE MINI) 31G X 6 MM miscellaneous USE 1 PEN NEEDLES DAILY OR AS DIRECTED 100 each 0    LANTUS SOLOSTAR 100 UNIT/ML soln INJECT 10 UNITS SUBCUTANEOUS AT BEDTIME 15 mL 3    losartan (COZAAR) 100 MG tablet TAKE 1 TABLET(100 MG) BY MOUTH DAILY 90 tablet 3    Probiotic Product (PROBIOTIC PO)       simvastatin (ZOCOR) 20 MG tablet TAKE 1/2 TABLET BY MOUTH EVERY NIGHT AT BEDTIME 45 tablet 0    blood glucose monitoring (NO BRAND SPECIFIED) meter device kit Use to test blood sugar 2 times daily or as directed. 1 kit 0     Allergies   Allergen Reactions    Septra [Bactrim]     Sulfa  "Antibiotics Rash     Current providers sharing in care for this patient include:  Patient Care Team:  Satish Pena MD as PCP - General  Satish Pena MD as Assigned PCP  Jazzy Blair APRN CNP as Assigned Dermatology Provider  Mariann Willingham MD as MD (Ophthalmology)    The following health maintenance items are reviewed in Epic and correct as of today:  Health Maintenance   Topic Date Due    DTAP/TDAP/TD VACCINE (2 - Td or Tdap) 11/30/2019    DIABETIC FOOT EXAM  04/29/2020    EYE EXAM  06/20/2024    COVID-19 VACCINE (10 - 2024-25 season) 03/23/2025    MEDICARE ANNUAL WELLNESS VISIT  06/05/2025    ANNUAL REVIEW OF HM ORDERS  06/05/2025    INFLUENZA VACCINE (1) 09/01/2025    DEXA  12/10/2025    A1C  01/07/2026    BMP  07/07/2026    LIPID  07/07/2026    MICROALBUMIN  07/07/2026    FALL RISK ASSESSMENT  07/11/2026    ADVANCE CARE PLANNING  06/05/2029    PHQ-2 (once per calendar year)  Completed    PNEUMOCOCCAL VACCINE 50+ YEARS  Completed    ZOSTER VACCINE  Completed    RSV VACCINE  Completed    HPV VACCINE  Aged Out    MENINGITIS VACCINE  Aged Out    MAMMO SCREENING  Discontinued         Review of Systems  Constitutional, HEENT, cardiovascular, pulmonary, gi and gu systems are negative, except as otherwise noted.     Objective    Exam  BP (!) 146/73 (BP Location: Right arm, Patient Position: Sitting, Cuff Size: Adult Regular)   Pulse 73   Temp 97.2  F (36.2  C) (Temporal)   Resp 20   Ht 1.495 m (4' 10.85\")   Wt 67.1 kg (148 lb)   SpO2 98%   BMI 30.04 kg/m     Estimated body mass index is 30.04 kg/m  as calculated from the following:    Height as of this encounter: 1.495 m (4' 10.85\").    Weight as of this encounter: 67.1 kg (148 lb).    Physical Exam  GENERAL: alert and no distress  EYES: Eyes grossly normal to inspection, PERRL and conjunctivae and sclerae normal  HENT: ear canals and TM's normal, nose and mouth without ulcers or lesions  NECK: no adenopathy, no asymmetry, masses, or " scars  RESP: lungs clear to auscultation - no rales, rhonchi or wheezes  CV: regular rate and rhythm, normal S1 S2, no S3 or S4, no murmur, click or rub, no peripheral edema  ABDOMEN: soft, nontender, no hepatosplenomegaly, no masses and bowel sounds normal  MS: no gross musculoskeletal defects noted, no edema  SKIN: no suspicious lesions or rashes  NEURO: Normal strength and tone, mentation intact and speech normal  BACK: no CVA tenderness, no paralumbar tenderness  PSYCH: mentation appears normal, affect normal/bright  LYMPH: no cervical, supraclavicular, axillary, or inguinal adenopathy        7/11/2025   Mini Cog   Clock Draw Score 2 Normal   3 Item Recall 3 objects recalled   Mini Cog Total Score 5              Signed Electronically by: Satish Pena MD

## 2025-07-11 NOTE — PATIENT INSTRUCTIONS
Patient Education   Preventive Care Advice   This is general advice given by our system to help you stay healthy. However, your care team may have specific advice just for you. Please talk to your care team about your preventive care needs.  Nutrition  Eat 5 or more servings of fruits and vegetables each day.  Try wheat bread, brown rice and whole grain pasta (instead of white bread, rice, and pasta).  Get enough calcium and vitamin D. Check the label on foods and aim for 100% of the RDA (recommended daily allowance).  Lifestyle  Exercise at least 150 minutes each week  (30 minutes a day, 5 days a week).  Do muscle strengthening activities 2 days a week. These help control your weight and prevent disease.  No smoking.  Wear sunscreen to prevent skin cancer.  Have a dental exam and cleaning every 6 months.  Yearly exams  See your health care team every year to talk about:  Any changes in your health.  Any medicines your care team has prescribed.  Preventive care, family planning, and ways to prevent chronic diseases.  Shots (vaccines)   HPV shots (up to age 26), if you've never had them before.  Hepatitis B shots (up to age 59), if you've never had them before.  COVID-19 shot: Get this shot when it's due.  Flu shot: Get a flu shot every year.  Tetanus shot: Get a tetanus shot every 10 years.  Pneumococcal, hepatitis A, and RSV shots: Ask your care team if you need these based on your risk.  Shingles shot (for age 50 and up)  General health tests  Diabetes screening:  Starting at age 35, Get screened for diabetes at least every 3 years.  If you are younger than age 35, ask your care team if you should be screened for diabetes.  Cholesterol test: At age 39, start having a cholesterol test every 5 years, or more often if advised.  Bone density scan (DEXA): At age 50, ask your care team if you should have this scan for osteoporosis (brittle bones).  Hepatitis C: Get tested at least once in your life.  STIs (sexually  transmitted infections)  Before age 24: Ask your care team if you should be screened for STIs.  After age 24: Get screened for STIs if you're at risk. You are at risk for STIs (including HIV) if:  You are sexually active with more than one person.  You don't use condoms every time.  You or a partner was diagnosed with a sexually transmitted infection.  If you are at risk for HIV, ask about PrEP medicine to prevent HIV.  Get tested for HIV at least once in your life, whether you are at risk for HIV or not.  Cancer screening tests  Cervical cancer screening: If you have a cervix, begin getting regular cervical cancer screening tests starting at age 21.  Breast cancer scan (mammogram): If you've ever had breasts, begin having regular mammograms starting at age 40. This is a scan to check for breast cancer.  Colon cancer screening: It is important to start screening for colon cancer at age 45.  Have a colonoscopy test every 10 years (or more often if you're at risk) Or, ask your provider about stool tests like a FIT test every year or Cologuard test every 3 years.  To learn more about your testing options, visit:   .  For help making a decision, visit:   https://bit.ly/rk87090.  Prostate cancer screening test: If you have a prostate, ask your care team if a prostate cancer screening test (PSA) at age 55 is right for you.  Lung cancer screening: If you are a current or former smoker ages 50 to 80, ask your care team if ongoing lung cancer screenings are right for you.  For informational purposes only. Not to replace the advice of your health care provider. Copyright   2023 [facility] [employer]. All rights reserved. Clinically reviewed by the [facility]. [employer] 373390 - REV 01/24.  Bladder Training: Care Instructions  Your Care Instructions     Bladder training is used to treat urge incontinence and stress incontinence. Urge incontinence means that the need to urinate comes on so fast  that you can't get to a toilet in time. Stress incontinence means that you leak urine because of pressure on your bladder. For example, it may happen when you laugh, cough, or lift something heavy.  Bladder training can increase how long you can wait before you have to urinate. It can also help your bladder hold more urine. And it can give you better control over the urge to urinate.  It is important to remember that bladder training takes a few weeks to a few months to make a difference. You may not see results right away, but don't give up.  Follow-up care is a key part of your treatment and safety. Be sure to make and go to all appointments, and call your doctor if you are having problems. It's also a good idea to know your test results and keep a list of the medicines you take.  How can you care for yourself at home?  Work with your doctor to come up with a bladder training program that is right for you. You may use one or more of the following methods.  Delayed urination  In the beginning, try to keep from urinating for 5 minutes after you first feel the need to go.  While you wait, take deep, slow breaths to relax. Kegel exercises can also help you delay the need to go to the bathroom.  After some practice, when you can easily wait 5 minutes to urinate, try to wait 10 minutes before you urinate.  Slowly increase the waiting period until you are able to control when you have to urinate.  Scheduled urination  Empty your bladder when you first wake up in the morning.  Schedule times throughout the day when you will urinate.  Start by going to the bathroom every hour, even if you don't need to go.  Slowly increase the time between trips to the bathroom.  When you have found a schedule that works well for you, keep doing it.  If you wake up during the night and have to urinate, do it. Apply your schedule to waking hours only.  Kegel exercises  These tighten and strengthen pelvic muscles, which can help you control  "the flow of urine. (If doing these exercises causes pain, stop doing them and talk with your doctor.) To do Kegel exercises:  Squeeze your muscles as if you were trying not to pass gas. Or squeeze your muscles as if you were stopping the flow of urine. Your belly, legs, and buttocks shouldn't move.  Hold the squeeze for 3 seconds, then relax for 5 to 10 seconds.  Start with 3 seconds, then add 1 second each week until you are able to squeeze for 10 seconds.  Repeat the exercise 10 times a session. Do 3 to 8 sessions a day.  When should you call for help?  Watch closely for changes in your health, and be sure to contact your doctor if:    Your incontinence is getting worse.     You do not get better as expected.   Where can you learn more?  Go to https://www.[employer].net/patiented  Enter V684 in the search box to learn more about \"Bladder Training: Care Instructions.\"  Current as of: April 30, 2024  Content Version: 14.5    1738-9381 [employer].   Care instructions adapted under license by your healthcare professional. If you have questions about a medical condition or this instruction, always ask your healthcare professional. [employer] disclaims any warranty or liability for your use of this information.       "

## 2025-07-12 DIAGNOSIS — E11.9 TYPE 2 DIABETES MELLITUS WITHOUT COMPLICATION, WITHOUT LONG-TERM CURRENT USE OF INSULIN (H): ICD-10-CM

## 2025-07-12 DIAGNOSIS — E78.5 HYPERLIPIDEMIA LDL GOAL <100: ICD-10-CM

## 2025-07-14 ENCOUNTER — PATIENT OUTREACH (OUTPATIENT)
Dept: CARE COORDINATION | Facility: CLINIC | Age: 89
End: 2025-07-14
Payer: MEDICARE

## 2025-07-14 PROBLEM — N18.31 CHRONIC KIDNEY DISEASE, STAGE 3A (H): Status: ACTIVE | Noted: 2025-07-14

## 2025-07-14 RX ORDER — SIMVASTATIN 20 MG
10 TABLET ORAL AT BEDTIME
Qty: 45 TABLET | Refills: 2 | Status: SHIPPED | OUTPATIENT
Start: 2025-07-14